# Patient Record
Sex: FEMALE | Race: WHITE | Employment: OTHER | URBAN - METROPOLITAN AREA
[De-identification: names, ages, dates, MRNs, and addresses within clinical notes are randomized per-mention and may not be internally consistent; named-entity substitution may affect disease eponyms.]

---

## 2017-09-07 ENCOUNTER — OP HISTORICAL/CONVERTED ENCOUNTER (OUTPATIENT)
Dept: OTHER | Age: 72
End: 2017-09-07

## 2018-01-29 ENCOUNTER — OP HISTORICAL/CONVERTED ENCOUNTER (OUTPATIENT)
Dept: OTHER | Age: 73
End: 2018-01-29

## 2018-02-05 ENCOUNTER — IP HISTORICAL/CONVERTED ENCOUNTER (OUTPATIENT)
Dept: OTHER | Age: 73
End: 2018-02-05

## 2018-08-08 ENCOUNTER — OP HISTORICAL/CONVERTED ENCOUNTER (OUTPATIENT)
Dept: OTHER | Age: 73
End: 2018-08-08

## 2019-04-26 ENCOUNTER — OP HISTORICAL/CONVERTED ENCOUNTER (OUTPATIENT)
Dept: OTHER | Age: 74
End: 2019-04-26

## 2020-01-28 ENCOUNTER — OP HISTORICAL/CONVERTED ENCOUNTER (OUTPATIENT)
Dept: OTHER | Age: 75
End: 2020-01-28

## 2020-02-16 ENCOUNTER — ED HISTORICAL/CONVERTED ENCOUNTER (OUTPATIENT)
Dept: OTHER | Age: 75
End: 2020-02-16

## 2020-02-27 ENCOUNTER — OP HISTORICAL/CONVERTED ENCOUNTER (OUTPATIENT)
Dept: OTHER | Age: 75
End: 2020-02-27

## 2020-06-14 ENCOUNTER — IP HISTORICAL/CONVERTED ENCOUNTER (OUTPATIENT)
Dept: OTHER | Age: 75
End: 2020-06-14

## 2020-07-10 ENCOUNTER — OP HISTORICAL/CONVERTED ENCOUNTER (OUTPATIENT)
Dept: OTHER | Age: 75
End: 2020-07-10

## 2020-10-13 ENCOUNTER — OFFICE VISIT (OUTPATIENT)
Dept: UROLOGY | Age: 75
End: 2020-10-13
Payer: MEDICARE

## 2020-10-13 VITALS
BODY MASS INDEX: 31.34 KG/M2 | HEIGHT: 61 IN | WEIGHT: 166 LBS | SYSTOLIC BLOOD PRESSURE: 146 MMHG | TEMPERATURE: 96.9 F | DIASTOLIC BLOOD PRESSURE: 90 MMHG

## 2020-10-13 DIAGNOSIS — R82.998 LEUKOCYTES IN URINE: Primary | ICD-10-CM

## 2020-10-13 DIAGNOSIS — N39.0 URINARY TRACT INFECTION WITHOUT HEMATURIA, SITE UNSPECIFIED: ICD-10-CM

## 2020-10-13 LAB
BILIRUB UR QL STRIP: NEGATIVE
GLUCOSE UR-MCNC: NEGATIVE MG/DL
KETONES P FAST UR STRIP-MCNC: NEGATIVE MG/DL
PH UR STRIP: 5 [PH] (ref 4.6–8)
PROT UR QL STRIP: NEGATIVE
SP GR UR STRIP: 1.02 (ref 1–1.03)
UA UROBILINOGEN AMB POC: NORMAL (ref 0.2–1)
URINALYSIS CLARITY POC: CLEAR
URINALYSIS COLOR POC: YELLOW
URINE BLOOD POC: NEGATIVE
URINE LEUKOCYTES POC: NORMAL
URINE NITRITES POC: NEGATIVE

## 2020-10-13 PROCEDURE — 99203 OFFICE O/P NEW LOW 30 MIN: CPT | Performed by: UROLOGY

## 2020-10-13 PROCEDURE — G8427 DOCREV CUR MEDS BY ELIG CLIN: HCPCS | Performed by: UROLOGY

## 2020-10-13 PROCEDURE — G8417 CALC BMI ABV UP PARAM F/U: HCPCS | Performed by: UROLOGY

## 2020-10-13 PROCEDURE — 81003 URINALYSIS AUTO W/O SCOPE: CPT | Performed by: UROLOGY

## 2020-10-13 RX ORDER — SIMVASTATIN 20 MG/1
TABLET, FILM COATED ORAL
COMMUNITY

## 2020-10-13 RX ORDER — MELOXICAM 15 MG/1
15 TABLET ORAL DAILY
COMMUNITY
End: 2021-03-12

## 2020-10-13 RX ORDER — OXYBUTYNIN CHLORIDE 5 MG/1
5 TABLET ORAL 2 TIMES DAILY
COMMUNITY

## 2020-10-13 RX ORDER — BISMUTH SUBSALICYLATE 262 MG
1 TABLET,CHEWABLE ORAL DAILY
COMMUNITY

## 2020-10-13 RX ORDER — CLOPIDOGREL BISULFATE 75 MG/1
TABLET ORAL
COMMUNITY

## 2020-10-13 RX ORDER — AMLODIPINE BESYLATE 10 MG/1
TABLET ORAL DAILY
COMMUNITY

## 2020-10-13 RX ORDER — FUROSEMIDE 40 MG/1
TABLET ORAL DAILY
COMMUNITY

## 2020-10-13 RX ORDER — OMEPRAZOLE 40 MG/1
40 CAPSULE, DELAYED RELEASE ORAL DAILY
COMMUNITY

## 2020-10-13 NOTE — LETTER
10/13/20 Patient: Kimberly Feliz YOB: 1945 Date of Visit: 10/13/2020 Kitty Jane MD 
[id number] [phone] VIA In Basket Dear Kitty Jane MD, Thank you for referring Ms. Nayan Mahmood to [address] [place] [address] [facility] [place] for evaluation. My notes for this consultation are attached. If you have questions, please do not hesitate to call me. I look forward to following your patient along with you. Sincerely, Keturah Solares MD

## 2020-10-13 NOTE — PROGRESS NOTES
HPI ROS PE NOTE          History of Present Illness   Chief complaint: Recurrent urinary tract infections  Timbo Galeano is a 67 y.o. female who presents with complaint of 2 serious episodes of hospitalization during the last year with urinary tract infection and sepsis. Her first episode was in February 2020 solved treatment in intensive care, not have records from this hospitalization, straight a problem from change of electronic medical record systems with previous system unavailable to reproduce. Denies any history of kidney stones and denies significant urinary symptoms such as incomplete emptying, she admits to nocturia 1-2 times per night but denies straining to urinate or sensation of incomplete emptying. Denies dysuria or hematuria. Was treated by her PCP and recently with 5 days of ciprofloxacin. Current urine specimen is negative and the patient is not having symptoms; she is taking cranberry supplement to assist in and preventing urinary tract infections she is also been on oxybutynin twice a day apparently to combat urinary frequency. The patient denies straining to urinate or sensation of incomplete emptying but does have nocturia 1-2 times per night. .   Past Medical History:   Diagnosis Date    Arthritis     Burning with urination     GERD (gastroesophageal reflux disease)     Hypercholesterolemia     Hypertension     Long term current use of anticoagulant therapy     plavix qd    Stroke [facility])       Past Surgical History:   Procedure Laterality Date    HX KNEE REPLACEMENT Left 2015    HX KNEE REPLACEMENT Right 2018    total    HX ORTHOPAEDIC Right 04/2019    femur fx repair oh placement     Family History   Problem Relation Age of Onset    Heart Disease Mother     Cancer Father         leukemia    Heart Disease Brother     Cancer Maternal Aunt         leukemia      Social History     Tobacco Use    Smoking status: Never Smoker    Smokeless tobacco: Never Used Substance Use Topics    Alcohol use: Never     Frequency: Never       Prior to Admission medications    Medication Sig Start Date End Date Taking? Authorizing Provider   cranberry extract 450 mg tab tablet Take 450 mg by mouth. Yes Provider, Historical   omeprazole (PRILOSEC) 40 mg capsule Take 40 mg by mouth daily. Yes Provider, Historical   amLODIPine (NORVASC) 10 mg tablet Take  by mouth daily. Yes Provider, Historical   oxybutynin (DITROPAN) 5 mg tablet Take 5 mg by mouth two (2) times a day. Yes Provider, Historical   clopidogreL (Plavix) 75 mg tab Take  by mouth. Yes Provider, Historical   simvastatin (ZOCOR) 20 mg tablet Take  by mouth nightly. Yes Provider, Historical   furosemide (LASIX) 40 mg tablet Take  by mouth daily. Yes Provider, Historical   meloxicam (MOBIC) 15 mg tablet Take 15 mg by mouth daily. Yes Provider, Historical   multivitamin (ONE A DAY) tablet Take 1 Tab by mouth daily.    Yes Provider, Historical     No Known Allergies     Review of Systems:  Constitutional: negative  Respiratory: negative  Cardiovascular: positive for Previous history of screening for coronary artery disease; patient may have had CVA or TIA  Gastrointestinal: positive for constipation  Genitourinary:positive for frequency and nocturia  Musculoskeletal:positive for myalgias, arthralgias, stiff joints and Osteoarthritis of the knees with knee replacements  Neurological: negative     Physical Exam     Physical Exam:   Visit Vitals  BP (!) 146/90   Temp 96.9 °F (36.1 °C) (Temporal)   Ht 5' 1\" (1.549 m)   Wt 166 lb (75.3 kg)   BMI 31.37 kg/m²     General appearance: alert, cooperative, no distress, appears stated age  Head: Normocephalic, without obvious abnormality, atraumatic  Lungs: clear to auscultation bilaterally  Heart: regular rate and rhythm, S1, S2 normal, no murmur, click, rub or gallop  Abdomen: Soft, nontender, possible palpable bladder and suprapubic area  Extremities: extremities normal, atraumatic, no cyanosis or edema  Skin: Skin color, texture, turgor normal. No rashes or lesions  Neurologic: Grossly normal    Data Review:   Recent Results (from the past 48 hour(s))   AMB POC URINALYSIS DIP STICK AUTO W/O MICRO    Collection Time: 10/13/20 11:26 AM   Result Value Ref Range    Color (UA POC) Yellow     Clarity (UA POC) Clear     Glucose (UA POC) Negative Negative    Bilirubin (UA POC) Negative Negative    Ketones (UA POC) Negative Negative    Specific gravity (UA POC) 1.020 1.001 - 1.035    Blood (UA POC) Negative Negative    pH (UA POC) 5.0 4.6 - 8.0    Protein (UA POC) Negative Negative    Urobilinogen (UA POC) 0.2 mg/dL 0.2 - 1    Nitrites (UA POC) Negative Negative    Leukocyte esterase (UA POC) 1+ Negative     No results for input(s): 48 in the last 72 hours. Assessment and Plan:   Recurrent urinary tract infections with sepsis on at least 2 occasions, possible bladder outlet obstruction with urethral stenosis: Recommend cystourethroscopy, bilateral retrograde pyelograms, and urethral dilation;may  need medical clearance for anesthesia, stopping of clopidogrel may be preferable for a few days prior to the procedure  Leukocytes in the urine, perform culture and sensitivity on today's specimen      Ms. Judy Fields has a reminder for a \"due or due soon\" health maintenance. I have asked that she contact her primary care provider for follow-up on this health maintenance. Katey Dacosta M.D.  10/13/2020

## 2020-10-15 LAB — BACTERIA UR CULT: NORMAL

## 2021-03-02 ENCOUNTER — APPOINTMENT (OUTPATIENT)
Dept: GENERAL RADIOLOGY | Age: 76
DRG: 522 | End: 2021-03-02
Attending: EMERGENCY MEDICINE
Payer: MEDICARE

## 2021-03-02 ENCOUNTER — APPOINTMENT (OUTPATIENT)
Dept: GENERAL RADIOLOGY | Age: 76
DRG: 522 | End: 2021-03-02
Attending: FAMILY MEDICINE
Payer: MEDICARE

## 2021-03-02 ENCOUNTER — HOSPITAL ENCOUNTER (INPATIENT)
Age: 76
LOS: 10 days | Discharge: NURSING, REHAB OR LONG-TERM CARE | DRG: 522 | End: 2021-03-12
Attending: EMERGENCY MEDICINE | Admitting: FAMILY MEDICINE
Payer: MEDICARE

## 2021-03-02 DIAGNOSIS — S72.001A CLOSED FRACTURE OF NECK OF RIGHT FEMUR, INITIAL ENCOUNTER (HCC): ICD-10-CM

## 2021-03-02 DIAGNOSIS — S42.291A OTHER CLOSED DISPLACED FRACTURE OF PROXIMAL END OF RIGHT HUMERUS, INITIAL ENCOUNTER: Primary | ICD-10-CM

## 2021-03-02 PROBLEM — S72.009A HIP FRACTURE (HCC): Status: ACTIVE | Noted: 2021-03-02

## 2021-03-02 LAB
ABO + RH BLD: NORMAL
ALBUMIN SERPL-MCNC: 3.7 G/DL (ref 3.5–5)
ALBUMIN/GLOB SERPL: 1.1 {RATIO} (ref 1.1–2.2)
ALP SERPL-CCNC: 80 U/L (ref 45–117)
ALT SERPL-CCNC: 28 U/L (ref 12–78)
ANION GAP SERPL CALC-SCNC: 11 MMOL/L (ref 5–15)
AST SERPL W P-5'-P-CCNC: 18 U/L (ref 15–37)
BASOPHILS # BLD: 0 K/UL (ref 0–0.1)
BASOPHILS NFR BLD: 0 % (ref 0–1)
BILIRUB SERPL-MCNC: 0.3 MG/DL (ref 0.2–1)
BLOOD BANK CMNT PATIENT-IMP: NORMAL
BLOOD GROUP ANTIBODIES SERPL: NEGATIVE
BUN SERPL-MCNC: 25 MG/DL (ref 6–20)
BUN/CREAT SERPL: 28 (ref 12–20)
CA-I BLD-MCNC: 8.9 MG/DL (ref 8.5–10.1)
CHLORIDE SERPL-SCNC: 107 MMOL/L (ref 97–108)
CO2 SERPL-SCNC: 24 MMOL/L (ref 21–32)
CREAT SERPL-MCNC: 0.88 MG/DL (ref 0.55–1.02)
DIFFERENTIAL METHOD BLD: ABNORMAL
EOSINOPHIL # BLD: 0 K/UL (ref 0–0.4)
EOSINOPHIL NFR BLD: 0 % (ref 0–7)
ERYTHROCYTE [DISTWIDTH] IN BLOOD BY AUTOMATED COUNT: 13.3 % (ref 11.5–14.5)
GLOBULIN SER CALC-MCNC: 3.5 G/DL (ref 2–4)
GLUCOSE SERPL-MCNC: 127 MG/DL (ref 65–100)
HCT VFR BLD AUTO: 33.8 % (ref 35–47)
HGB BLD-MCNC: 10.9 G/DL (ref 11.5–16)
IMM GRANULOCYTES # BLD AUTO: 0.1 K/UL (ref 0–0.04)
IMM GRANULOCYTES NFR BLD AUTO: 0 % (ref 0–0.5)
LYMPHOCYTES # BLD: 1 K/UL (ref 0.8–3.5)
LYMPHOCYTES NFR BLD: 6 % (ref 12–49)
MCH RBC QN AUTO: 28.7 PG (ref 26–34)
MCHC RBC AUTO-ENTMCNC: 32.2 G/DL (ref 30–36.5)
MCV RBC AUTO: 88.9 FL (ref 80–99)
MONOCYTES # BLD: 0.8 K/UL (ref 0–1)
MONOCYTES NFR BLD: 5 % (ref 5–13)
NEUTS SEG # BLD: 14.4 K/UL (ref 1.8–8)
NEUTS SEG NFR BLD: 89 % (ref 32–75)
PLATELET # BLD AUTO: 277 K/UL (ref 150–400)
PMV BLD AUTO: 10.7 FL (ref 8.9–12.9)
POTASSIUM SERPL-SCNC: 4 MMOL/L (ref 3.5–5.1)
PROT SERPL-MCNC: 7.2 G/DL (ref 6.4–8.2)
RBC # BLD AUTO: 3.8 M/UL (ref 3.8–5.2)
SODIUM SERPL-SCNC: 142 MMOL/L (ref 136–145)
SPECIMEN EXP DATE BLD: NORMAL
WBC # BLD AUTO: 16.2 K/UL (ref 3.6–11)

## 2021-03-02 PROCEDURE — 86901 BLOOD TYPING SEROLOGIC RH(D): CPT

## 2021-03-02 PROCEDURE — 73502 X-RAY EXAM HIP UNI 2-3 VIEWS: CPT

## 2021-03-02 PROCEDURE — 74011250636 HC RX REV CODE- 250/636: Performed by: FAMILY MEDICINE

## 2021-03-02 PROCEDURE — 96374 THER/PROPH/DIAG INJ IV PUSH: CPT

## 2021-03-02 PROCEDURE — 71045 X-RAY EXAM CHEST 1 VIEW: CPT

## 2021-03-02 PROCEDURE — 74011000250 HC RX REV CODE- 250: Performed by: FAMILY MEDICINE

## 2021-03-02 PROCEDURE — 96375 TX/PRO/DX INJ NEW DRUG ADDON: CPT

## 2021-03-02 PROCEDURE — 65270000029 HC RM PRIVATE

## 2021-03-02 PROCEDURE — 74011250636 HC RX REV CODE- 250/636: Performed by: EMERGENCY MEDICINE

## 2021-03-02 PROCEDURE — 87040 BLOOD CULTURE FOR BACTERIA: CPT

## 2021-03-02 PROCEDURE — 93005 ELECTROCARDIOGRAM TRACING: CPT

## 2021-03-02 PROCEDURE — 73552 X-RAY EXAM OF FEMUR 2/>: CPT

## 2021-03-02 PROCEDURE — 36415 COLL VENOUS BLD VENIPUNCTURE: CPT

## 2021-03-02 PROCEDURE — 85025 COMPLETE CBC W/AUTO DIFF WBC: CPT

## 2021-03-02 PROCEDURE — 73030 X-RAY EXAM OF SHOULDER: CPT

## 2021-03-02 PROCEDURE — 80053 COMPREHEN METABOLIC PANEL: CPT

## 2021-03-02 PROCEDURE — 99285 EMERGENCY DEPT VISIT HI MDM: CPT

## 2021-03-02 RX ORDER — ACETAMINOPHEN 650 MG/1
650 SUPPOSITORY RECTAL
Status: DISCONTINUED | OUTPATIENT
Start: 2021-03-02 | End: 2021-03-03

## 2021-03-02 RX ORDER — BISMUTH SUBSALICYLATE 262 MG
1 TABLET,CHEWABLE ORAL DAILY
Status: DISCONTINUED | OUTPATIENT
Start: 2021-03-03 | End: 2021-03-12 | Stop reason: HOSPADM

## 2021-03-02 RX ORDER — BISMUTH SUBSALICYLATE 262 MG
1 TABLET,CHEWABLE ORAL DAILY
Status: DISCONTINUED | OUTPATIENT
Start: 2021-03-03 | End: 2021-03-03

## 2021-03-02 RX ORDER — ONDANSETRON 2 MG/ML
4 INJECTION INTRAMUSCULAR; INTRAVENOUS
Status: DISCONTINUED | OUTPATIENT
Start: 2021-03-02 | End: 2021-03-12 | Stop reason: HOSPADM

## 2021-03-02 RX ORDER — AMLODIPINE BESYLATE 5 MG/1
10 TABLET ORAL DAILY
Status: DISCONTINUED | OUTPATIENT
Start: 2021-03-03 | End: 2021-03-12 | Stop reason: HOSPADM

## 2021-03-02 RX ORDER — OXYBUTYNIN CHLORIDE 5 MG/1
5 TABLET ORAL 2 TIMES DAILY
Status: DISCONTINUED | OUTPATIENT
Start: 2021-03-03 | End: 2021-03-03

## 2021-03-02 RX ORDER — ONDANSETRON 4 MG/1
4 TABLET, ORALLY DISINTEGRATING ORAL
Status: DISCONTINUED | OUTPATIENT
Start: 2021-03-02 | End: 2021-03-12 | Stop reason: HOSPADM

## 2021-03-02 RX ORDER — HYDROMORPHONE HYDROCHLORIDE 1 MG/ML
1 INJECTION, SOLUTION INTRAMUSCULAR; INTRAVENOUS; SUBCUTANEOUS ONCE
Status: COMPLETED | OUTPATIENT
Start: 2021-03-02 | End: 2021-03-02

## 2021-03-02 RX ORDER — MORPHINE SULFATE 2 MG/ML
2 INJECTION, SOLUTION INTRAMUSCULAR; INTRAVENOUS
Status: DISPENSED | OUTPATIENT
Start: 2021-03-02 | End: 2021-03-04

## 2021-03-02 RX ORDER — SODIUM CHLORIDE 9 MG/ML
75 INJECTION, SOLUTION INTRAVENOUS CONTINUOUS
Status: DISCONTINUED | OUTPATIENT
Start: 2021-03-03 | End: 2021-03-03

## 2021-03-02 RX ORDER — MORPHINE SULFATE 4 MG/ML
4 INJECTION INTRAVENOUS ONCE
Status: COMPLETED | OUTPATIENT
Start: 2021-03-02 | End: 2021-03-02

## 2021-03-02 RX ORDER — PANTOPRAZOLE SODIUM 40 MG/1
40 TABLET, DELAYED RELEASE ORAL
Status: ACTIVE | OUTPATIENT
Start: 2021-03-03 | End: 2021-03-03

## 2021-03-02 RX ORDER — OXYBUTYNIN CHLORIDE 5 MG/1
5 TABLET ORAL 2 TIMES DAILY
Status: DISCONTINUED | OUTPATIENT
Start: 2021-03-03 | End: 2021-03-12 | Stop reason: HOSPADM

## 2021-03-02 RX ORDER — AMLODIPINE BESYLATE 5 MG/1
10 TABLET ORAL DAILY
Status: DISCONTINUED | OUTPATIENT
Start: 2021-03-03 | End: 2021-03-03

## 2021-03-02 RX ORDER — ONDANSETRON 2 MG/ML
4 INJECTION INTRAMUSCULAR; INTRAVENOUS
Status: COMPLETED | OUTPATIENT
Start: 2021-03-02 | End: 2021-03-02

## 2021-03-02 RX ORDER — SIMVASTATIN 10 MG/1
20 TABLET, FILM COATED ORAL
Status: DISCONTINUED | OUTPATIENT
Start: 2021-03-03 | End: 2021-03-12 | Stop reason: HOSPADM

## 2021-03-02 RX ORDER — PANTOPRAZOLE SODIUM 40 MG/1
40 TABLET, DELAYED RELEASE ORAL
Status: DISCONTINUED | OUTPATIENT
Start: 2021-03-03 | End: 2021-03-12 | Stop reason: HOSPADM

## 2021-03-02 RX ORDER — SODIUM CHLORIDE 9 MG/ML
75 INJECTION, SOLUTION INTRAVENOUS CONTINUOUS
Status: DISCONTINUED | OUTPATIENT
Start: 2021-03-02 | End: 2021-03-05

## 2021-03-02 RX ORDER — POLYETHYLENE GLYCOL 3350 17 G/17G
17 POWDER, FOR SOLUTION ORAL DAILY PRN
Status: DISCONTINUED | OUTPATIENT
Start: 2021-03-02 | End: 2021-03-12 | Stop reason: HOSPADM

## 2021-03-02 RX ORDER — SIMVASTATIN 10 MG/1
20 TABLET, FILM COATED ORAL
Status: DISCONTINUED | OUTPATIENT
Start: 2021-03-02 | End: 2021-03-03

## 2021-03-02 RX ORDER — FUROSEMIDE 40 MG/1
40 TABLET ORAL DAILY
Status: DISCONTINUED | OUTPATIENT
Start: 2021-03-03 | End: 2021-03-03

## 2021-03-02 RX ORDER — ACETAMINOPHEN 325 MG/1
650 TABLET ORAL
Status: DISCONTINUED | OUTPATIENT
Start: 2021-03-02 | End: 2021-03-03

## 2021-03-02 RX ORDER — FUROSEMIDE 40 MG/1
40 TABLET ORAL DAILY
Status: DISCONTINUED | OUTPATIENT
Start: 2021-03-03 | End: 2021-03-12 | Stop reason: HOSPADM

## 2021-03-02 RX ORDER — ENOXAPARIN SODIUM 100 MG/ML
40 INJECTION SUBCUTANEOUS DAILY
Status: DISCONTINUED | OUTPATIENT
Start: 2021-03-03 | End: 2021-03-06

## 2021-03-02 RX ADMIN — CEFTRIAXONE SODIUM 1 G: 1 INJECTION, POWDER, FOR SOLUTION INTRAMUSCULAR; INTRAVENOUS at 23:46

## 2021-03-02 RX ADMIN — MORPHINE SULFATE 4 MG: 4 INJECTION, SOLUTION INTRAMUSCULAR; INTRAVENOUS at 19:19

## 2021-03-02 RX ADMIN — MORPHINE SULFATE 2 MG: 2 INJECTION, SOLUTION INTRAMUSCULAR; INTRAVENOUS at 23:50

## 2021-03-02 RX ADMIN — HYDROMORPHONE HYDROCHLORIDE 1 MG: 1 INJECTION, SOLUTION INTRAMUSCULAR; INTRAVENOUS; SUBCUTANEOUS at 20:37

## 2021-03-02 RX ADMIN — ONDANSETRON 4 MG: 2 INJECTION INTRAMUSCULAR; INTRAVENOUS at 20:42

## 2021-03-02 RX ADMIN — SODIUM CHLORIDE 75 ML/HR: 9 INJECTION, SOLUTION INTRAVENOUS at 22:07

## 2021-03-02 NOTE — Clinical Note
Status[de-identified] INPATIENT [101]   Type of Bed: Surgical [18]   Inpatient Hospitalization Certified Necessary for the Following Reasons: 3.  Patient receiving treatment that can only be provided in an inpatient setting (further clarification in H&P documentation)   Admitting Diagnosis: Closed right hip fracture [facility]) [[id number]]   Admitting Physician: Janusz Lowe [[id number]]   Attending Physician: Janusz Lowe [[id number]]   Estimated Length of Stay: 2 Midnights   Discharge Plan[de-identified] Home with Office Follow-up

## 2021-03-02 NOTE — ED PROVIDER NOTES
EMERGENCY DEPARTMENT HISTORY AND PHYSICAL EXAM        Date: 3/2/2021  Patient Name: Cherrie Terrazas    History of Presenting Illness     Chief Complaint   Patient presents with    Fall       History Provided By: Patient    HPI: Cherrie Terrazas, 67 y.o. female with history of GERD, hyperlipidemia, hypertension, and CVA who presents with fall. Patient is complaining of 10/10 pain in her right shoulder and right hip. Pain is worse with movement in both extremities. Pain is nonradiating, and constant. Denies hitting her head. No neck pain. States that she fell while in the garage walking her dog. PCP: Alvaro Chadwick NP    Current Facility-Administered Medications   Medication Dose Route Frequency Provider Last Rate Last Admin    0.9% sodium chloride infusion  75 mL/hr IntraVENous CONTINUOUS Lisa Budgeeta,          Current Outpatient Medications   Medication Sig Dispense Refill    cranberry extract 450 mg tab tablet Take 450 mg by mouth.  omeprazole (PRILOSEC) 40 mg capsule Take 40 mg by mouth daily.  amLODIPine (NORVASC) 10 mg tablet Take  by mouth daily.  oxybutynin (DITROPAN) 5 mg tablet Take 5 mg by mouth two (2) times a day.  clopidogreL (Plavix) 75 mg tab Take  by mouth.  simvastatin (ZOCOR) 20 mg tablet Take  by mouth nightly.  furosemide (LASIX) 40 mg tablet Take  by mouth daily.  meloxicam (MOBIC) 15 mg tablet Take 15 mg by mouth daily.  multivitamin (ONE A DAY) tablet Take 1 Tab by mouth daily.          Past History     Past Medical History:  Past Medical History:   Diagnosis Date    Arthritis     Burning with urination     GERD (gastroesophageal reflux disease)     Hypercholesterolemia     Hypertension     Long term current use of anticoagulant therapy     plavix qd    Stroke [facility])        Past Surgical History:  Past Surgical History:   Procedure Laterality Date    HX KNEE REPLACEMENT Left 2015    HX KNEE REPLACEMENT Right 2018    total    HX ORTHOPAEDIC Right 04/2019    femur fx repair oh placement       Family History:  Family History   Problem Relation Age of Onset    Heart Disease Mother     Cancer Father         leukemia    Heart Disease Brother     Cancer Maternal Aunt         leukemia       Social History:  Social History     Tobacco Use    Smoking status: Never Smoker    Smokeless tobacco: Never Used   Substance Use Topics    Alcohol use: Never     Frequency: Never    Drug use: Never       Allergies:  No Known Allergies    Review of Systems   Review of Systems   Constitutional: Negative for fever. HENT: Negative for congestion. Eyes: Negative for visual disturbance. Respiratory: Negative for shortness of breath. Cardiovascular: Negative for chest pain. Gastrointestinal: Negative for abdominal pain. Genitourinary: Negative for dysuria. Musculoskeletal: Negative for arthralgias. Skin: Negative for rash. Neurological: Negative for headaches. Physical Exam   Constitutional: Moderate distress and appears uncomfortable. Nontoxic. Well-nourished. Skin: No rash. ENT: No rhinorrhea. No cough. Head is normocephalic and atraumatic. Eye: No proptosis or conjunctival injections. Respiratory: No apparent respiratory distress. Gastrointestinal: Nondistended. Musculoskeletal: Tenderness to the right shoulder and right hip. Any movement in the extremity in the right hip or right shoulder causes pain. No swelling. Normal peripheral perfusion and pulses distally. Psychiatric: Cooperative. Appropriate mood and affect. Diagnostic Study Results     Labs -   No results found for this or any previous visit (from the past 24 hour(s)). Radiologic Studies -   XR HIP RT W OR WO PELV 2-3 VWS   Final Result   FINDINGS: IMPRESSION: AP pelvis and limited lateral versus frog-leg imaging of   the right hip. Right femoral neck fracture with superior migration of the long bone fragment. No dislocation.       Incomplete imaging of  mid femoral diaphyseal deformity from remote fracture. Intramedullary femoral oh has been removed since 2018. XR SHOULDER RT AP/LAT MIN 2 V   Final Result   FINDINGS: IMPRESSION: 3 images of the right shoulder. Transverse fracture of the humerus neck with almost complete displacement of the   long bone fragment medially. The humerus head maintains gross alignment with the   bony glenoid. CT Results  (Last 48 hours)    None        CXR Results  (Last 48 hours)    None          Medical Decision Making and ED Course     I reviewed the available vital signs, nursing notes, past medical history, past surgical history, family history, and social history. Vital Signs - Reviewed the patient's vital signs. Patient Vitals for the past 12 hrs:   Temp Pulse Resp BP SpO2   03/02/21 1759 96.8 °F (36 °C) 76 18 (!) 143/80 93 %     EKG: Obtained on 3/2/2021 at 2153. Normal sinus rhythm at rate of 80 bpm.  Normal IA interval, QRS duration, QTc interval.  No ST segment abnormalities. Normal axis. Left bundle branch block    Medical Decision Making:   Presented with fall. The differential diagnosis is shoulder fracture, shoulder dislocation, hip fracture, hip dislocation. X-ray shows right shoulder and right hip fracture. Discussed with orthopedic surgeon, Dr. Juancho Urena, who agreed to consult. Discussed with hospitalist, Dr. Manuel Paulino, who agreed to admit. Pain reasonably controlled with morphine and Dilaudid. Disposition     Admitted to Dr. Manuel Paulino    Diagnosis     Clinical impression:   1. Other closed displaced fracture of proximal end of right humerus, initial encounter    2. Closed fracture of neck of right femur, initial encounter [facility])           Attestation:  Please note that this dictation was completed with [employer], the [employer] voice recognition software.  Quite often unanticipated grammatical, syntax, homophones, and other interpretive errors are inadvertently transcribed by the computer software. Please disregard these errors. Please excuse any errors that have escaped final proofreading. Thank you.   Kenyon Rios, DO

## 2021-03-02 NOTE — ED TRIAGE NOTES
Pt states she had a GLF outside earlier today, believes she lost her footing on some rocks. Denies head injury. C/o R arm/shoulder pain and R hip pain. Pt given 100mcg of fentanyl PTA.  A?O x4

## 2021-03-03 ENCOUNTER — APPOINTMENT (OUTPATIENT)
Dept: GENERAL RADIOLOGY | Age: 76
DRG: 522 | End: 2021-03-03
Attending: ORTHOPAEDIC SURGERY
Payer: MEDICARE

## 2021-03-03 ENCOUNTER — ANESTHESIA EVENT (OUTPATIENT)
Dept: SURGERY | Age: 76
DRG: 522 | End: 2021-03-03
Payer: MEDICARE

## 2021-03-03 ENCOUNTER — ANESTHESIA (OUTPATIENT)
Dept: SURGERY | Age: 76
DRG: 522 | End: 2021-03-03
Payer: MEDICARE

## 2021-03-03 LAB
ALBUMIN SERPL-MCNC: 3.6 G/DL (ref 3.5–5)
ALBUMIN/GLOB SERPL: 1 {RATIO} (ref 1.1–2.2)
ALP SERPL-CCNC: 77 U/L (ref 45–117)
ALT SERPL-CCNC: 27 U/L (ref 12–78)
ANION GAP SERPL CALC-SCNC: 7 MMOL/L (ref 5–15)
APPEARANCE UR: CLEAR
AST SERPL W P-5'-P-CCNC: 18 U/L (ref 15–37)
ATRIAL RATE: 80 BPM
BACTERIA URNS QL MICRO: NEGATIVE /HPF
BASOPHILS # BLD: 0 K/UL (ref 0–0.1)
BASOPHILS # BLD: 0 K/UL (ref 0–0.1)
BASOPHILS NFR BLD: 0 % (ref 0–1)
BASOPHILS NFR BLD: 0 % (ref 0–1)
BILIRUB SERPL-MCNC: 0.3 MG/DL (ref 0.2–1)
BILIRUB UR QL: NEGATIVE
BUN SERPL-MCNC: 27 MG/DL (ref 6–20)
BUN/CREAT SERPL: 30 (ref 12–20)
CA-I BLD-MCNC: 9.1 MG/DL (ref 8.5–10.1)
CALCULATED P AXIS, ECG09: 58 DEGREES
CALCULATED R AXIS, ECG10: -29 DEGREES
CALCULATED T AXIS, ECG11: 109 DEGREES
CHLORIDE SERPL-SCNC: 106 MMOL/L (ref 97–108)
CO2 SERPL-SCNC: 26 MMOL/L (ref 21–32)
COLOR UR: ABNORMAL
CREAT SERPL-MCNC: 0.9 MG/DL (ref 0.55–1.02)
DIAGNOSIS, 93000: NORMAL
DIFFERENTIAL METHOD BLD: ABNORMAL
DIFFERENTIAL METHOD BLD: ABNORMAL
EOSINOPHIL # BLD: 0 K/UL (ref 0–0.4)
EOSINOPHIL # BLD: 0 K/UL (ref 0–0.4)
EOSINOPHIL NFR BLD: 0 % (ref 0–7)
EOSINOPHIL NFR BLD: 0 % (ref 0–7)
ERYTHROCYTE [DISTWIDTH] IN BLOOD BY AUTOMATED COUNT: 13.4 % (ref 11.5–14.5)
ERYTHROCYTE [DISTWIDTH] IN BLOOD BY AUTOMATED COUNT: 13.4 % (ref 11.5–14.5)
GLOBULIN SER CALC-MCNC: 3.6 G/DL (ref 2–4)
GLUCOSE SERPL-MCNC: 132 MG/DL (ref 65–100)
GLUCOSE UR STRIP.AUTO-MCNC: NEGATIVE MG/DL
HCT VFR BLD AUTO: 30.7 % (ref 35–47)
HCT VFR BLD AUTO: 32.4 % (ref 35–47)
HGB BLD-MCNC: 10.5 G/DL (ref 11.5–16)
HGB BLD-MCNC: 9.7 G/DL (ref 11.5–16)
HGB UR QL STRIP: ABNORMAL
IMM GRANULOCYTES # BLD AUTO: 0 K/UL (ref 0–0.04)
IMM GRANULOCYTES # BLD AUTO: 0.1 K/UL (ref 0–0.04)
IMM GRANULOCYTES NFR BLD AUTO: 0 % (ref 0–0.5)
IMM GRANULOCYTES NFR BLD AUTO: 0 % (ref 0–0.5)
KETONES UR QL STRIP.AUTO: 5 MG/DL
LEUKOCYTE ESTERASE UR QL STRIP.AUTO: NEGATIVE
LYMPHOCYTES # BLD: 0.8 K/UL (ref 0.8–3.5)
LYMPHOCYTES # BLD: 1.7 K/UL (ref 0.8–3.5)
LYMPHOCYTES NFR BLD: 13 % (ref 12–49)
LYMPHOCYTES NFR BLD: 5 % (ref 12–49)
MAGNESIUM SERPL-MCNC: 1.8 MG/DL (ref 1.6–2.4)
MCH RBC QN AUTO: 28.5 PG (ref 26–34)
MCH RBC QN AUTO: 29 PG (ref 26–34)
MCHC RBC AUTO-ENTMCNC: 31.6 G/DL (ref 30–36.5)
MCHC RBC AUTO-ENTMCNC: 32.4 G/DL (ref 30–36.5)
MCV RBC AUTO: 89.5 FL (ref 80–99)
MCV RBC AUTO: 90.3 FL (ref 80–99)
MONOCYTES # BLD: 0.6 K/UL (ref 0–1)
MONOCYTES # BLD: 1 K/UL (ref 0–1)
MONOCYTES NFR BLD: 4 % (ref 5–13)
MONOCYTES NFR BLD: 7 % (ref 5–13)
MUCOUS THREADS URNS QL MICRO: ABNORMAL /LPF
NEUTS SEG # BLD: 10.5 K/UL (ref 1.8–8)
NEUTS SEG # BLD: 14.6 K/UL (ref 1.8–8)
NEUTS SEG NFR BLD: 80 % (ref 32–75)
NEUTS SEG NFR BLD: 91 % (ref 32–75)
NITRITE UR QL STRIP.AUTO: NEGATIVE
NRBC # BLD: 0 K/UL (ref 0–0.01)
NRBC BLD-RTO: 0 PER 100 WBC
P-R INTERVAL, ECG05: 150 MS
PH UR STRIP: 6 [PH] (ref 5–8)
PLATELET # BLD AUTO: 240 K/UL (ref 150–400)
PLATELET # BLD AUTO: 271 K/UL (ref 150–400)
PMV BLD AUTO: 10.4 FL (ref 8.9–12.9)
PMV BLD AUTO: 11 FL (ref 8.9–12.9)
POTASSIUM SERPL-SCNC: 4.1 MMOL/L (ref 3.5–5.1)
POTASSIUM SERPL-SCNC: 4.4 MMOL/L (ref 3.5–5.1)
PROT SERPL-MCNC: 7.2 G/DL (ref 6.4–8.2)
PROT UR STRIP-MCNC: NEGATIVE MG/DL
Q-T INTERVAL, ECG07: 424 MS
QRS DURATION, ECG06: 136 MS
QTC CALCULATION (BEZET), ECG08: 489 MS
RBC # BLD AUTO: 3.4 M/UL (ref 3.8–5.2)
RBC # BLD AUTO: 3.62 M/UL (ref 3.8–5.2)
RBC #/AREA URNS HPF: ABNORMAL /HPF (ref 0–5)
SODIUM SERPL-SCNC: 139 MMOL/L (ref 136–145)
SP GR UR REFRACTOMETRY: 1.02 (ref 1–1.03)
UROBILINOGEN UR QL STRIP.AUTO: 0.1 EU/DL (ref 0.1–1)
VENTRICULAR RATE, ECG03: 80 BPM
WBC # BLD AUTO: 13.2 K/UL (ref 3.6–11)
WBC # BLD AUTO: 16 K/UL (ref 3.6–11)
WBC URNS QL MICRO: ABNORMAL /HPF (ref 0–4)

## 2021-03-03 PROCEDURE — 76210000006 HC OR PH I REC 0.5 TO 1 HR: Performed by: ORTHOPAEDIC SURGERY

## 2021-03-03 PROCEDURE — 77030010783 HC BOWL MX BN CEM J&J -B: Performed by: ORTHOPAEDIC SURGERY

## 2021-03-03 PROCEDURE — 74011250636 HC RX REV CODE- 250/636: Performed by: ORTHOPAEDIC SURGERY

## 2021-03-03 PROCEDURE — 65270000029 HC RM PRIVATE

## 2021-03-03 PROCEDURE — 74011250637 HC RX REV CODE- 250/637: Performed by: ORTHOPAEDIC SURGERY

## 2021-03-03 PROCEDURE — 77030006835 HC BLD SAW SAG STRY -B: Performed by: ORTHOPAEDIC SURGERY

## 2021-03-03 PROCEDURE — 77030035236 HC SUT PDS STRATFX BARB J&J -B: Performed by: ORTHOPAEDIC SURGERY

## 2021-03-03 PROCEDURE — 93005 ELECTROCARDIOGRAM TRACING: CPT

## 2021-03-03 PROCEDURE — 76010000131 HC OR TIME 2 TO 2.5 HR: Performed by: ORTHOPAEDIC SURGERY

## 2021-03-03 PROCEDURE — 85025 COMPLETE CBC W/AUTO DIFF WBC: CPT

## 2021-03-03 PROCEDURE — 77030012935 HC DRSG AQUACEL BMS -B: Performed by: ORTHOPAEDIC SURGERY

## 2021-03-03 PROCEDURE — 74011250636 HC RX REV CODE- 250/636: Performed by: EMERGENCY MEDICINE

## 2021-03-03 PROCEDURE — 84132 ASSAY OF SERUM POTASSIUM: CPT

## 2021-03-03 PROCEDURE — 77030003666 HC NDL SPINAL BD -A: Performed by: ORTHOPAEDIC SURGERY

## 2021-03-03 PROCEDURE — 83735 ASSAY OF MAGNESIUM: CPT

## 2021-03-03 PROCEDURE — 77030008684 HC TU ET CUF COVD -B: Performed by: NURSE ANESTHETIST, CERTIFIED REGISTERED

## 2021-03-03 PROCEDURE — 74011250637 HC RX REV CODE- 250/637: Performed by: FAMILY MEDICINE

## 2021-03-03 PROCEDURE — 77030002933 HC SUT MCRYL J&J -A: Performed by: ORTHOPAEDIC SURGERY

## 2021-03-03 PROCEDURE — 77030018074 HC RTVR SUT ARTH4 S&N -B: Performed by: ORTHOPAEDIC SURGERY

## 2021-03-03 PROCEDURE — 77030013079 HC BLNKT BAIR HGGR 3M -A: Performed by: NURSE ANESTHETIST, CERTIFIED REGISTERED

## 2021-03-03 PROCEDURE — 87040 BLOOD CULTURE FOR BACTERIA: CPT

## 2021-03-03 PROCEDURE — 87086 URINE CULTURE/COLONY COUNT: CPT

## 2021-03-03 PROCEDURE — 76060000035 HC ANESTHESIA 2 TO 2.5 HR: Performed by: ORTHOPAEDIC SURGERY

## 2021-03-03 PROCEDURE — 77030013708 HC HNDPC SUC IRR PULS STRY –B: Performed by: ORTHOPAEDIC SURGERY

## 2021-03-03 PROCEDURE — 36415 COLL VENOUS BLD VENIPUNCTURE: CPT

## 2021-03-03 PROCEDURE — 77030011264 HC ELECTRD BLD EXT COVD -A: Performed by: ORTHOPAEDIC SURGERY

## 2021-03-03 PROCEDURE — 2709999900 HC NON-CHARGEABLE SUPPLY: Performed by: ORTHOPAEDIC SURGERY

## 2021-03-03 PROCEDURE — 77030019908 HC STETH ESOPH SIMS -A: Performed by: NURSE ANESTHETIST, CERTIFIED REGISTERED

## 2021-03-03 PROCEDURE — 81001 URINALYSIS AUTO W/SCOPE: CPT

## 2021-03-03 PROCEDURE — 77030031139 HC SUT VCRL2 J&J -A: Performed by: ORTHOPAEDIC SURGERY

## 2021-03-03 PROCEDURE — 72170 X-RAY EXAM OF PELVIS: CPT

## 2021-03-03 PROCEDURE — 0SRR01Z REPLACEMENT OF RIGHT HIP JOINT, FEMORAL SURFACE WITH METAL SYNTHETIC SUBSTITUTE, OPEN APPROACH: ICD-10-PCS | Performed by: ORTHOPAEDIC SURGERY

## 2021-03-03 PROCEDURE — 74011000250 HC RX REV CODE- 250: Performed by: FAMILY MEDICINE

## 2021-03-03 PROCEDURE — C1776 JOINT DEVICE (IMPLANTABLE): HCPCS | Performed by: ORTHOPAEDIC SURGERY

## 2021-03-03 PROCEDURE — 74011000250 HC RX REV CODE- 250: Performed by: ORTHOPAEDIC SURGERY

## 2021-03-03 PROCEDURE — 74011250636 HC RX REV CODE- 250/636: Performed by: FAMILY MEDICINE

## 2021-03-03 PROCEDURE — 74011000250 HC RX REV CODE- 250: Performed by: NURSE ANESTHETIST, CERTIFIED REGISTERED

## 2021-03-03 PROCEDURE — 74011250636 HC RX REV CODE- 250/636: Performed by: NURSE ANESTHETIST, CERTIFIED REGISTERED

## 2021-03-03 DEVICE — HEAD FEM DIA28MM NK L+1.5MM HIP MTL ON MTL 12/14 TAPR: Type: IMPLANTABLE DEVICE | Site: HIP | Status: FUNCTIONAL

## 2021-03-03 DEVICE — STEM FEM SZ 6 L150MM 130DEG STD OFFSET CALCAR HIP BILAT TI: Type: IMPLANTABLE DEVICE | Site: HIP | Status: FUNCTIONAL

## 2021-03-03 DEVICE — HEAD BPLR OD43MM ID28MM FEM HIP GRY POS ECC SELF CNTR: Type: IMPLANTABLE DEVICE | Site: HIP | Status: FUNCTIONAL

## 2021-03-03 RX ORDER — GLYCOPYRROLATE 0.2 MG/ML
INJECTION INTRAMUSCULAR; INTRAVENOUS AS NEEDED
Status: DISCONTINUED | OUTPATIENT
Start: 2021-03-03 | End: 2021-03-03 | Stop reason: HOSPADM

## 2021-03-03 RX ORDER — ROCURONIUM BROMIDE 10 MG/ML
INJECTION, SOLUTION INTRAVENOUS AS NEEDED
Status: DISCONTINUED | OUTPATIENT
Start: 2021-03-03 | End: 2021-03-03 | Stop reason: HOSPADM

## 2021-03-03 RX ORDER — ACETAMINOPHEN 650 MG/1
650 SUPPOSITORY RECTAL EVERY 6 HOURS
Status: DISCONTINUED | OUTPATIENT
Start: 2021-03-03 | End: 2021-03-05

## 2021-03-03 RX ORDER — SODIUM CHLORIDE 0.9 % (FLUSH) 0.9 %
5-40 SYRINGE (ML) INJECTION EVERY 8 HOURS
Status: DISCONTINUED | OUTPATIENT
Start: 2021-03-03 | End: 2021-03-12 | Stop reason: HOSPADM

## 2021-03-03 RX ORDER — SUCCINYLCHOLINE CHLORIDE 20 MG/ML
INJECTION INTRAMUSCULAR; INTRAVENOUS AS NEEDED
Status: DISCONTINUED | OUTPATIENT
Start: 2021-03-03 | End: 2021-03-03 | Stop reason: HOSPADM

## 2021-03-03 RX ORDER — SODIUM CHLORIDE 0.9 % (FLUSH) 0.9 %
5-40 SYRINGE (ML) INJECTION AS NEEDED
Status: DISCONTINUED | OUTPATIENT
Start: 2021-03-03 | End: 2021-03-12 | Stop reason: HOSPADM

## 2021-03-03 RX ORDER — PROPOFOL 10 MG/ML
INJECTION, EMULSION INTRAVENOUS AS NEEDED
Status: DISCONTINUED | OUTPATIENT
Start: 2021-03-03 | End: 2021-03-03 | Stop reason: HOSPADM

## 2021-03-03 RX ORDER — DEXAMETHASONE SODIUM PHOSPHATE 4 MG/ML
INJECTION, SOLUTION INTRA-ARTICULAR; INTRALESIONAL; INTRAMUSCULAR; INTRAVENOUS; SOFT TISSUE AS NEEDED
Status: DISCONTINUED | OUTPATIENT
Start: 2021-03-03 | End: 2021-03-03 | Stop reason: HOSPADM

## 2021-03-03 RX ORDER — CALCIUM CARBONATE/VITAMIN D3 600MG-5MCG
1 TABLET ORAL
Status: DISCONTINUED | OUTPATIENT
Start: 2021-03-04 | End: 2021-03-12 | Stop reason: HOSPADM

## 2021-03-03 RX ORDER — CEFAZOLIN SODIUM 1 G/3ML
INJECTION, POWDER, FOR SOLUTION INTRAMUSCULAR; INTRAVENOUS AS NEEDED
Status: DISCONTINUED | OUTPATIENT
Start: 2021-03-03 | End: 2021-03-03 | Stop reason: HOSPADM

## 2021-03-03 RX ORDER — SODIUM CHLORIDE 9 MG/ML
125 INJECTION, SOLUTION INTRAVENOUS CONTINUOUS
Status: DISPENSED | OUTPATIENT
Start: 2021-03-03 | End: 2021-03-04

## 2021-03-03 RX ORDER — ACETAMINOPHEN 325 MG/1
650 TABLET ORAL EVERY 6 HOURS
Status: DISCONTINUED | OUTPATIENT
Start: 2021-03-03 | End: 2021-03-05

## 2021-03-03 RX ORDER — OXYCODONE HYDROCHLORIDE 5 MG/1
5 TABLET ORAL
Status: DISCONTINUED | OUTPATIENT
Start: 2021-03-03 | End: 2021-03-05

## 2021-03-03 RX ORDER — NALOXONE HYDROCHLORIDE 0.4 MG/ML
0.4 INJECTION, SOLUTION INTRAMUSCULAR; INTRAVENOUS; SUBCUTANEOUS AS NEEDED
Status: DISCONTINUED | OUTPATIENT
Start: 2021-03-03 | End: 2021-03-12 | Stop reason: HOSPADM

## 2021-03-03 RX ORDER — LIDOCAINE HYDROCHLORIDE 20 MG/ML
INJECTION, SOLUTION EPIDURAL; INFILTRATION; INTRACAUDAL; PERINEURAL AS NEEDED
Status: DISCONTINUED | OUTPATIENT
Start: 2021-03-03 | End: 2021-03-03 | Stop reason: HOSPADM

## 2021-03-03 RX ORDER — AMOXICILLIN 250 MG
1 CAPSULE ORAL 2 TIMES DAILY
Status: DISCONTINUED | OUTPATIENT
Start: 2021-03-03 | End: 2021-03-12 | Stop reason: HOSPADM

## 2021-03-03 RX ORDER — SODIUM CHLORIDE 0.9 % (FLUSH) 0.9 %
5-40 SYRINGE (ML) INJECTION EVERY 8 HOURS
Status: DISCONTINUED | OUTPATIENT
Start: 2021-03-03 | End: 2021-03-03 | Stop reason: HOSPADM

## 2021-03-03 RX ORDER — SODIUM CHLORIDE, SODIUM LACTATE, POTASSIUM CHLORIDE, CALCIUM CHLORIDE 600; 310; 30; 20 MG/100ML; MG/100ML; MG/100ML; MG/100ML
INJECTION, SOLUTION INTRAVENOUS
Status: DISCONTINUED | OUTPATIENT
Start: 2021-03-03 | End: 2021-03-03 | Stop reason: HOSPADM

## 2021-03-03 RX ORDER — DEXMEDETOMIDINE HYDROCHLORIDE 100 UG/ML
INJECTION, SOLUTION INTRAVENOUS AS NEEDED
Status: DISCONTINUED | OUTPATIENT
Start: 2021-03-03 | End: 2021-03-03 | Stop reason: HOSPADM

## 2021-03-03 RX ORDER — NEOSTIGMINE METHYLSULFATE 5 MG/5 ML
SYRINGE (ML) INTRAVENOUS AS NEEDED
Status: DISCONTINUED | OUTPATIENT
Start: 2021-03-03 | End: 2021-03-03 | Stop reason: HOSPADM

## 2021-03-03 RX ORDER — SODIUM CHLORIDE 0.9 % (FLUSH) 0.9 %
5-40 SYRINGE (ML) INJECTION AS NEEDED
Status: DISCONTINUED | OUTPATIENT
Start: 2021-03-03 | End: 2021-03-03 | Stop reason: HOSPADM

## 2021-03-03 RX ORDER — FACIAL-BODY WIPES
10 EACH TOPICAL DAILY PRN
Status: DISCONTINUED | OUTPATIENT
Start: 2021-03-05 | End: 2021-03-12 | Stop reason: HOSPADM

## 2021-03-03 RX ORDER — POLYETHYLENE GLYCOL 3350 17 G/17G
17 POWDER, FOR SOLUTION ORAL DAILY
Status: DISCONTINUED | OUTPATIENT
Start: 2021-03-04 | End: 2021-03-12 | Stop reason: HOSPADM

## 2021-03-03 RX ORDER — LIDOCAINE HYDROCHLORIDE 10 MG/ML
0.1 INJECTION, SOLUTION EPIDURAL; INFILTRATION; INTRACAUDAL; PERINEURAL AS NEEDED
Status: DISCONTINUED | OUTPATIENT
Start: 2021-03-03 | End: 2021-03-03 | Stop reason: HOSPADM

## 2021-03-03 RX ORDER — BUPIVACAINE HYDROCHLORIDE 2.5 MG/ML
INJECTION, SOLUTION EPIDURAL; INFILTRATION; INTRACAUDAL AS NEEDED
Status: DISCONTINUED | OUTPATIENT
Start: 2021-03-03 | End: 2021-03-03 | Stop reason: HOSPADM

## 2021-03-03 RX ORDER — HYDROMORPHONE HYDROCHLORIDE 1 MG/ML
INJECTION, SOLUTION INTRAMUSCULAR; INTRAVENOUS; SUBCUTANEOUS AS NEEDED
Status: DISCONTINUED | OUTPATIENT
Start: 2021-03-03 | End: 2021-03-03 | Stop reason: HOSPADM

## 2021-03-03 RX ORDER — FENTANYL CITRATE 50 UG/ML
INJECTION, SOLUTION INTRAMUSCULAR; INTRAVENOUS AS NEEDED
Status: DISCONTINUED | OUTPATIENT
Start: 2021-03-03 | End: 2021-03-03 | Stop reason: HOSPADM

## 2021-03-03 RX ORDER — MIDAZOLAM HYDROCHLORIDE 1 MG/ML
1 INJECTION, SOLUTION INTRAMUSCULAR; INTRAVENOUS AS NEEDED
Status: DISCONTINUED | OUTPATIENT
Start: 2021-03-03 | End: 2021-03-03 | Stop reason: HOSPADM

## 2021-03-03 RX ORDER — ONDANSETRON 2 MG/ML
INJECTION INTRAMUSCULAR; INTRAVENOUS AS NEEDED
Status: DISCONTINUED | OUTPATIENT
Start: 2021-03-03 | End: 2021-03-03 | Stop reason: HOSPADM

## 2021-03-03 RX ORDER — HYDROMORPHONE HYDROCHLORIDE 1 MG/ML
1 INJECTION, SOLUTION INTRAMUSCULAR; INTRAVENOUS; SUBCUTANEOUS ONCE
Status: COMPLETED | OUTPATIENT
Start: 2021-03-03 | End: 2021-03-03

## 2021-03-03 RX ORDER — FENTANYL CITRATE 50 UG/ML
50 INJECTION, SOLUTION INTRAMUSCULAR; INTRAVENOUS AS NEEDED
Status: DISCONTINUED | OUTPATIENT
Start: 2021-03-03 | End: 2021-03-03 | Stop reason: HOSPADM

## 2021-03-03 RX ORDER — LABETALOL HYDROCHLORIDE 5 MG/ML
INJECTION, SOLUTION INTRAVENOUS AS NEEDED
Status: DISCONTINUED | OUTPATIENT
Start: 2021-03-03 | End: 2021-03-03 | Stop reason: HOSPADM

## 2021-03-03 RX ADMIN — SIMVASTATIN 20 MG: 10 TABLET, FILM COATED ORAL at 23:39

## 2021-03-03 RX ADMIN — Medication 2 MG: at 16:22

## 2021-03-03 RX ADMIN — GLYCOPYRROLATE 0.2 MG: 0.2 INJECTION, SOLUTION INTRAMUSCULAR; INTRAVENOUS at 16:20

## 2021-03-03 RX ADMIN — PROPOFOL 100 MG: 10 INJECTION, EMULSION INTRAVENOUS at 14:38

## 2021-03-03 RX ADMIN — CEFAZOLIN SODIUM 2 G: 1 INJECTION, POWDER, FOR SOLUTION INTRAMUSCULAR; INTRAVENOUS at 14:55

## 2021-03-03 RX ADMIN — PHENYLEPHRINE HYDROCHLORIDE 200 MCG: 10 INJECTION INTRAVENOUS at 16:09

## 2021-03-03 RX ADMIN — DEXMEDETOMIDINE HYDROCHLORIDE 10 MCG: 100 INJECTION, SOLUTION INTRAVENOUS at 15:24

## 2021-03-03 RX ADMIN — Medication 10 ML: at 18:01

## 2021-03-03 RX ADMIN — Medication 1 MG: at 16:24

## 2021-03-03 RX ADMIN — ROCURONIUM BROMIDE 10 MG: 10 SOLUTION INTRAVENOUS at 14:38

## 2021-03-03 RX ADMIN — SUCCINYLCHOLINE CHLORIDE 140 MG: 20 INJECTION, SOLUTION INTRAMUSCULAR; INTRAVENOUS at 14:39

## 2021-03-03 RX ADMIN — CEFTRIAXONE SODIUM 1 G: 1 INJECTION, POWDER, FOR SOLUTION INTRAMUSCULAR; INTRAVENOUS at 23:40

## 2021-03-03 RX ADMIN — DEXMEDETOMIDINE HYDROCHLORIDE 10 MCG: 100 INJECTION, SOLUTION INTRAVENOUS at 15:29

## 2021-03-03 RX ADMIN — LIDOCAINE HYDROCHLORIDE 60 MG: 20 INJECTION, SOLUTION EPIDURAL; INFILTRATION; INTRACAUDAL; PERINEURAL at 14:38

## 2021-03-03 RX ADMIN — FENTANYL CITRATE 100 MCG: 50 INJECTION, SOLUTION INTRAMUSCULAR; INTRAVENOUS at 14:35

## 2021-03-03 RX ADMIN — OXYCODONE HYDROCHLORIDE 5 MG: 5 TABLET ORAL at 20:32

## 2021-03-03 RX ADMIN — DOCUSATE SODIUM 50 MG AND SENNOSIDES 8.6 MG 1 TABLET: 8.6; 5 TABLET, FILM COATED ORAL at 20:32

## 2021-03-03 RX ADMIN — ACETAMINOPHEN 650 MG: 325 TABLET, FILM COATED ORAL at 23:39

## 2021-03-03 RX ADMIN — DEXMEDETOMIDINE HYDROCHLORIDE 10 MCG: 100 INJECTION, SOLUTION INTRAVENOUS at 15:47

## 2021-03-03 RX ADMIN — ONDANSETRON 4 MG: 2 INJECTION INTRAMUSCULAR; INTRAVENOUS at 15:17

## 2021-03-03 RX ADMIN — SODIUM CHLORIDE, POTASSIUM CHLORIDE, SODIUM LACTATE AND CALCIUM CHLORIDE: 600; 310; 30; 20 INJECTION, SOLUTION INTRAVENOUS at 16:09

## 2021-03-03 RX ADMIN — DEXAMETHASONE SODIUM PHOSPHATE 4 MG: 4 INJECTION, SOLUTION INTRA-ARTICULAR; INTRALESIONAL; INTRAMUSCULAR; INTRAVENOUS; SOFT TISSUE at 15:14

## 2021-03-03 RX ADMIN — LABETALOL HYDROCHLORIDE 5 MG: 5 INJECTION INTRAVENOUS at 15:49

## 2021-03-03 RX ADMIN — ONDANSETRON 4 MG: 2 INJECTION INTRAMUSCULAR; INTRAVENOUS at 16:21

## 2021-03-03 RX ADMIN — ROCURONIUM BROMIDE 40 MG: 10 SOLUTION INTRAVENOUS at 15:04

## 2021-03-03 RX ADMIN — OXYBUTYNIN CHLORIDE 5 MG: 5 TABLET ORAL at 20:32

## 2021-03-03 RX ADMIN — HYDROMORPHONE HYDROCHLORIDE 1 MG: 1 INJECTION, SOLUTION INTRAMUSCULAR; INTRAVENOUS; SUBCUTANEOUS at 10:23

## 2021-03-03 RX ADMIN — SODIUM CHLORIDE 125 ML/HR: 9 INJECTION, SOLUTION INTRAVENOUS at 17:59

## 2021-03-03 RX ADMIN — MORPHINE SULFATE 2 MG: 2 INJECTION, SOLUTION INTRAMUSCULAR; INTRAVENOUS at 05:48

## 2021-03-03 RX ADMIN — CEFAZOLIN SODIUM 1 G: 1 INJECTION, POWDER, FOR SOLUTION INTRAMUSCULAR; INTRAVENOUS at 20:31

## 2021-03-03 RX ADMIN — HYDROMORPHONE HYDROCHLORIDE 1 MG: 1 INJECTION, SOLUTION INTRAMUSCULAR; INTRAVENOUS; SUBCUTANEOUS at 15:01

## 2021-03-03 NOTE — ANESTHESIA PREPROCEDURE EVALUATION
Relevant Problems   No relevant active problems       Anesthetic History   No history of anesthetic complications            Review of Systems / Medical History  Patient summary reviewed, nursing notes reviewed and pertinent labs reviewed    Pulmonary  Within defined limits                 Neuro/Psych       CVA  TIA     Cardiovascular    Hypertension          Hyperlipidemia    Exercise tolerance: >4 METS     GI/Hepatic/Renal     GERD    Renal disease: CRI       Endo/Other        Obesity, arthritis and anemia     Other Findings            Past Medical History:   Diagnosis Date    Arthritis     Burning with urination     GERD (gastroesophageal reflux disease)     Hypercholesterolemia     Hypertension     Long term current use of anticoagulant therapy     plavix qd    Stroke [facility])        Past Surgical History:   Procedure Laterality Date    HX KNEE REPLACEMENT Left 2015    HX KNEE REPLACEMENT Right 2018    total    HX ORTHOPAEDIC Right 04/2019    femur fx repair oh placement       Current Outpatient Medications   Medication Instructions    amLODIPine (NORVASC) 10 mg tablet Oral, DAILY    clopidogreL (Plavix) 75 mg tab Oral    cranberry extract 450 mg, Oral    furosemide (LASIX) 40 mg tablet Oral, DAILY    meloxicam (MOBIC) 15 mg, Oral, DAILY    multivitamin (ONE A DAY) tablet 1 Tab, Oral, DAILY    omeprazole (PRILOSEC) 40 mg, Oral, DAILY    oxybutynin (DITROPAN) 5 mg, Oral, 2 TIMES DAILY    simvastatin (ZOCOR) 20 mg tablet Oral, EVERY BEDTIME       Current Facility-Administered Medications   Medication Dose Route Frequency    0.9% sodium chloride infusion  75 mL/hr IntraVENous CONTINUOUS    morphine injection 2 mg  2 mg IntraVENous Q4H PRN    pantoprazole (PROTONIX) tablet 40 mg  40 mg Oral NOW    amLODIPine (NORVASC) tablet 10 mg  10 mg Oral DAILY    furosemide (LASIX) tablet 40 mg  40 mg Oral DAILY    multivitamin (ONE A DAY) tablet 1 Tab  1 Tab Oral DAILY    pantoprazole (PROTONIX) tablet 40 mg  40 mg Oral ACB    oxybutynin (DITROPAN) tablet 5 mg  5 mg Oral BID    simvastatin (ZOCOR) tablet 20 mg  20 mg Oral QHS    acetaminophen (TYLENOL) tablet 650 mg  650 mg Oral Q6H PRN    Or    acetaminophen (TYLENOL) suppository 650 mg  650 mg Rectal Q6H PRN    polyethylene glycol (MIRALAX) packet 17 g  17 g Oral DAILY PRN    ondansetron (ZOFRAN ODT) tablet 4 mg  4 mg Oral Q8H PRN    Or    ondansetron (ZOFRAN) injection 4 mg  4 mg IntraVENous Q6H PRN    enoxaparin (LOVENOX) injection 40 mg  40 mg SubCUTAneous DAILY    cefTRIAXone (ROCEPHIN) 1 g in sterile water (preservative free) 10 mL IV syringe  1 g IntraVENous Q24H       Patient Vitals for the past 24 hrs:   Temp Pulse Resp BP SpO2   03/03/21 1325  78 22 (!) 155/61 92 %   03/03/21 1043 36.9 °C (98.5 °F) 81 20 (!) 149/69 98 %   03/02/21 1759 36 °C (96.8 °F) 76 18 (!) 143/80 93 %       Lab Results   Component Value Date/Time    WBC 13.2 (H) 03/03/2021 06:00 AM    HGB 10.5 (L) 03/03/2021 06:00 AM    HCT 32.4 (L) 03/03/2021 06:00 AM    PLATELET 242 25/41/9579 06:00 AM    MCV 89.5 03/03/2021 06:00 AM     Lab Results   Component Value Date/Time    Sodium 139 03/03/2021 06:00 AM    Potassium 4.1 03/03/2021 06:00 AM    Chloride 106 03/03/2021 06:00 AM    CO2 26 03/03/2021 06:00 AM    Anion gap 7 03/03/2021 06:00 AM    Glucose 132 (H) 03/03/2021 06:00 AM    BUN 27 (H) 03/03/2021 06:00 AM    Creatinine 0.90 03/03/2021 06:00 AM    BUN/Creatinine ratio 30 (H) 03/03/2021 06:00 AM    GFR est AA >60 03/03/2021 06:00 AM    GFR est non-AA >60 03/03/2021 06:00 AM    Calcium 9.1 03/03/2021 06:00 AM     No results found for: APTT, PTP, INR, INREXT  Lab Results   Component Value Date/Time    Glucose 132 (H) 03/03/2021 06:00 AM     Physical Exam    Airway  Mallampati: II  TM Distance: 4 - 6 cm  Neck ROM: normal range of motion   Mouth opening: Normal     Cardiovascular    Rhythm: regular  Rate: normal         Dental  No notable dental hx       Pulmonary  Breath sounds clear to auscultation               Abdominal  GI exam deferred       Other Findings            Anesthetic Plan    ASA: 3, emergent  Anesthesia type: general          Induction: Intravenous  Anesthetic plan and risks discussed with: Patient and Family

## 2021-03-03 NOTE — ED NOTES
Spoke with Dr Herman Gonzalez about the patients request to be transferred to another hospital and he states that he will deal with this request in the morning nursing supervisor made aware of this

## 2021-03-03 NOTE — PROGRESS NOTES
Postoperative rounding done after right hip hemiarthroplasty  Hip pain is much better than before surgery  Hip pain is 4 out of 10  Complains of shoulder pain but better with sling and swath  No complaints of tingling or numbness      Assessment:  1. Postop day 0 right hip hemiarthroplasty  2. Displaced fracture proximal humerus, right      Plan/recommendations:  -Postoperative orders for the hemiarthroplasty placed  -We will check hemoglobin at 8 PM today. Will get 1 unit ready.   Transfusion if hemoglobin less than 9 to prepare her for surgery tomorrow  -Patient can eat dinner today and then n.p.o. from midnight  -Plan for ORIF of right proximal humerus tomorrow

## 2021-03-03 NOTE — ANESTHESIA PREPROCEDURE EVALUATION
Relevant Problems   No relevant active problems       Anesthetic History   No history of anesthetic complications            Review of Systems / Medical History  Patient summary reviewed, nursing notes reviewed and pertinent labs reviewed    Pulmonary  Within defined limits                 Neuro/Psych       CVA  TIA     Cardiovascular    Hypertension          Hyperlipidemia    Exercise tolerance: >4 METS     GI/Hepatic/Renal     GERD    Renal disease: CRI  Hiatal hernia     Endo/Other        Obesity, arthritis and anemia     Other Findings          Past Medical History:   Diagnosis Date    Arthritis     Burning with urination     GERD (gastroesophageal reflux disease)     Hypercholesterolemia     Hypertension     Long term current use of anticoagulant therapy     plavix qd    Stroke [facility])        Past Surgical History:   Procedure Laterality Date    HX KNEE REPLACEMENT Left 2015    HX KNEE REPLACEMENT Right 2018    total    HX ORTHOPAEDIC Right 04/2019    femur fx repair oh placement       Current Outpatient Medications   Medication Instructions    amLODIPine (NORVASC) 10 mg tablet Oral, DAILY    clopidogreL (Plavix) 75 mg tab Oral    cranberry extract 450 mg, Oral    furosemide (LASIX) 40 mg tablet Oral, DAILY    meloxicam (MOBIC) 15 mg, Oral, DAILY    multivitamin (ONE A DAY) tablet 1 Tab, Oral, DAILY    omeprazole (PRILOSEC) 40 mg, Oral, DAILY    oxybutynin (DITROPAN) 5 mg, Oral, 2 TIMES DAILY    simvastatin (ZOCOR) 20 mg tablet Oral, EVERY BEDTIME       No current facility-administered medications for this visit. No current outpatient medications on file.      Facility-Administered Medications Ordered in Other Visits   Medication Dose Route Frequency    tranexamic acid (CYKLOKAPRON) 1,000 mg in 0.9% sodium chloride 50 mL IVPB  1 g IntraVENous ONCE    acetaminophen (TYLENOL) tablet 650 mg  650 mg Oral Q6H    Or    acetaminophen (TYLENOL) suppository 650 mg  650 mg Rectal Q6H    0.9% sodium chloride infusion  125 mL/hr IntraVENous CONTINUOUS    sodium chloride (NS) flush 5-40 mL  5-40 mL IntraVENous Q8H    sodium chloride (NS) flush 5-40 mL  5-40 mL IntraVENous PRN    naloxone (NARCAN) injection 0.4 mg  0.4 mg IntraVENous PRN    [START ON 3/4/2021] calcium-vitamin D 600 mg(1,500mg) -200 unit per tablet 1 Tab  1 Tab Oral TID WITH MEALS    senna-docusate (PERICOLACE) 8.6-50 mg per tablet 1 Tab  1 Tab Oral BID    [START ON 3/4/2021] polyethylene glycol (MIRALAX) packet 17 g  17 g Oral DAILY    [START ON 3/5/2021] bisacodyL (DULCOLAX) suppository 10 mg  10 mg Rectal DAILY PRN    oxyCODONE IR (ROXICODONE) tablet 5 mg  5 mg Oral Q4H PRN    ceFAZolin (ANCEF) 1 g in sterile water (preservative free) 10 mL IV syringe  1 g IntraVENous Q8H    0.9% sodium chloride infusion  75 mL/hr IntraVENous CONTINUOUS    morphine injection 2 mg  2 mg IntraVENous Q4H PRN    pantoprazole (PROTONIX) tablet 40 mg  40 mg Oral NOW    amLODIPine (NORVASC) tablet 10 mg  10 mg Oral DAILY    furosemide (LASIX) tablet 40 mg  40 mg Oral DAILY    multivitamin (ONE A DAY) tablet 1 Tab  1 Tab Oral DAILY    pantoprazole (PROTONIX) tablet 40 mg  40 mg Oral ACB    oxybutynin (DITROPAN) tablet 5 mg  5 mg Oral BID    simvastatin (ZOCOR) tablet 20 mg  20 mg Oral QHS    polyethylene glycol (MIRALAX) packet 17 g  17 g Oral DAILY PRN    ondansetron (ZOFRAN ODT) tablet 4 mg  4 mg Oral Q8H PRN    Or    ondansetron (ZOFRAN) injection 4 mg  4 mg IntraVENous Q6H PRN    enoxaparin (LOVENOX) injection 40 mg  40 mg SubCUTAneous DAILY    cefTRIAXone (ROCEPHIN) 1 g in sterile water (preservative free) 10 mL IV syringe  1 g IntraVENous Q24H       No data found.     Lab Results   Component Value Date/Time    WBC 13.2 (H) 03/03/2021 06:00 AM    HGB 10.5 (L) 03/03/2021 06:00 AM    HCT 32.4 (L) 03/03/2021 06:00 AM    PLATELET 256 51/53/1608 06:00 AM    MCV 89.5 03/03/2021 06:00 AM     Lab Results   Component Value Date/Time    Sodium 139 03/03/2021 06:00 AM    Potassium 4.1 03/03/2021 06:00 AM    Chloride 106 03/03/2021 06:00 AM    CO2 26 03/03/2021 06:00 AM    Anion gap 7 03/03/2021 06:00 AM    Glucose 132 (H) 03/03/2021 06:00 AM    BUN 27 (H) 03/03/2021 06:00 AM    Creatinine 0.90 03/03/2021 06:00 AM    BUN/Creatinine ratio 30 (H) 03/03/2021 06:00 AM    GFR est AA >60 03/03/2021 06:00 AM    GFR est non-AA >60 03/03/2021 06:00 AM    Calcium 9.1 03/03/2021 06:00 AM     No results found for: APTT, PTP, INR, INREXT, INREXT  Lab Results   Component Value Date/Time    Glucose 132 (H) 03/03/2021 06:00 AM     Physical Exam    Airway  Mallampati: II  TM Distance: 4 - 6 cm  Neck ROM: normal range of motion   Mouth opening: Normal     Cardiovascular    Rhythm: regular  Rate: normal         Dental  No notable dental hx       Pulmonary  Breath sounds clear to auscultation               Abdominal  GI exam deferred       Other Findings            Anesthetic Plan    ASA: 3, emergent  Anesthesia type: general          Induction: Intravenous  Anesthetic plan and risks discussed with: Patient and Family      Anesthesia consent signed, witnessed,and placed on chart

## 2021-03-03 NOTE — PROGRESS NOTES
Spiritual Care Assessment/Progress Note  [facility]      NAME: Virginie Head      MRN: [id number]  AGE: 67 y.o.  SEX: female  Adventist Affiliation: No preference   Language: English     3/3/2021     Total Time (in minutes): 40     Spiritual Assessment begun in [address] [facility] through conversation with:         [x]Patient        [] Family    [] Friend(s)        Reason for Consult: Pre surgery consult     Spiritual beliefs: (Please include comment if needed)     [x] Identifies with a diana tradition:  Faith     [] Supported by a diana community:            [] Claims no spiritual orientation:           [] Seeking spiritual identity:                [] Adheres to an individual form of spirituality:           [] Not able to assess:                           Identified resources for coping:      [x] Prayer                               [] Music                  [] Guided Imagery     [] Family/friends                 [] Pet visits     [] Devotional reading                         [] Unknown     [] Other:                                              Interventions offered during this visit: (See comments for more details)    Patient Interventions: Prayer (actual), Normalization of emotional/spiritual concerns, Bridging, Affirmation of diana, Affirmation of emotions/emotional suffering, Catharsis/review of pertinent events in supportive environment           Plan of Care:     [] Support spiritual and/or cultural needs    [] Support AMD and/or advance care planning process      [] Support grieving process   [] Coordinate Rites and/or Rituals    [] Coordination with community clergy   [] No spiritual needs identified at this time   [] Detailed Plan of Care below (See Comments)  [] Make referral to Music Therapy  [] Make referral to Pet Therapy     [] Make referral to Addiction services  [] Make referral to [facility]  [] Make referral to Spiritual Care Partner  [] No future visits requested        [x] Follow up upon further referrals     Comments:  visited patient in PACU in response to patient's request for prayer prior to surgery.  and patient engaged in conversation re patient's hx of fractured femur and current fall and fracture of hip and shoulder. Patient requested a  but readily requested prayer from .  provided prayer and words of caring and support.  advised patient of availability of chaplains for follow up upon further referrals.      Visited by William Donnelly [facility], [facility]    can be contacted by calling  and requesting  on call

## 2021-03-03 NOTE — ED NOTES
Paged ortho at this time to ask about what splint is wanted for the patients injuries she states that a sling is all that is needed at this time. The patient is now requesting to be transferred to [facility] where her old orthopedic MD is Dr Ethel Mccauley. Paged Marcia at this time to discuss the request there was no answer a message was left at this time.

## 2021-03-03 NOTE — OP NOTES
Name of patient: Jesus Benson     MRN: [id number]    Date of surgery: 3/3/2021     Surgeon: Chana Freed MD    Assistant: OR scrub nurse    Pre-operative diagnosis: Displaced femoral neck fracture, right hip    Post operative diagnosis: Displaced femoral neck fracture, right hip    Procedure: Right hip hemiarthroplasty    Anesthesia: General    Complications: none    Blood loss: 350 cc    Specimen: None    IMPLANTS:   DePuy [place] stem size 6 standard offset  Outer head ball, 43 mm  Inner head ball 28 mm +1.5    Indication of the procedure: 67y.o. -year-old female patient fell down yesterday and was brought to the ER. X-rays demonstrated displaced femoral neck fracture. Treatment options were discussed and then she was explained for right hemiarthroplasty. See preoperative consult note for further indication. Procedure details: Patient as well as right lower extremity was identified and marked in the holding area. Patient was then brought to the operating room and positioned supine onto the operating table. General anesthesia was induced by anesthesia provider. Now patient was positioned left lateral with carefully padded hip positioner. 2 g of Ancef was started go 30 minutes prior to incision. Now standard prepping and draping was done. time-out was completed and agreed by everyone in the room. Now posterior lateral incision was made and dissection was carried through the adipose tissues up to the fascia. Fascia as well as gluteus stacey muscle was incised along the line of incision. Short external rotators including piriformis were identified and tagged for future repair. Abductors were retracted proximally. Now short external rotators as well as posterior capsule was detached from posterior trochanter. Capsule was tagged with Ethibond stitches. Now broken femoral head was removed using power corkscrew.   Head size was measured using template and then trial was done with the measured head ball.    Now attention was drawn to proximal femur. Retractors were placed. Freshening neck cut was made using the template. Now femoral canal was opened using appropriate instrumentation and then reaming was done up to size 6. Broaching was subsequently done sequentially up to size 6 and this broach was left into the canal as a trial stem. Using standard neck and +1.5 mm head ball, reduction was done. Stability was checked which suggested hip was stable up to 75 degrees of hip internal rotation at hip and knee flexed at 90 degrees. The leg length was also restored. Now all trial components were removed and real implants were impacted. Now plenty of irrigation was performed. Fascia was closed using strata fix. subcutaneous closure was done using 2-0 Vicryl and skin was opposed with 3-0 Monocryl. Steri-Strips and Aquasol dressing was applied. all sharp and sponge counts were correct. patient was then transferred to recovery room in stable condition. I was scrubbed in and performed the entire procedure by myself.     Jun Villafuerte MD

## 2021-03-03 NOTE — H&P
.  History and Physical    NAME: Callum Levin   :  1945   MRN:  [id number]     Date/Time:  3/3/2021 10:05 AM    Patient PCP: Robby Barclay, NP  ______________________________________________________________________    Elvin Muzzy with right femur fracture left humerus fracture         Subjective:     CHIEF COMPLAINT:     Fall with right femur fracture left humerus fracture    HISTORY OF PRESENT ILLNESS:       Patient is a 67y.o. year old female history of hypertension GERD hyperlipidemia history of CVA came to the ER after have a fall  Patient had a ground-level fall yesterday she lost her balance affording on a small rock denies any head injury came to the ER seen by the ER physician x-rays done shows left femur fracture and left humerus fracture and patient was recommend to be admitted for further evaluation treatment orthopedic was consulted patient have pain 8 out of 10 on morphine    Past Medical History:   Diagnosis Date    Arthritis     Burning with urination     GERD (gastroesophageal reflux disease)     Hypercholesterolemia     Hypertension     Long term current use of anticoagulant therapy     plavix qd    Stroke [facility])         Past Surgical History:   Procedure Laterality Date    HX KNEE REPLACEMENT Left     HX KNEE REPLACEMENT Right     total    HX ORTHOPAEDIC Right 2019    femur fx repair oh placement       Social History     Tobacco Use    Smoking status: Never Smoker    Smokeless tobacco: Never Used   Substance Use Topics    Alcohol use: Never     Frequency: Never        Family History   Problem Relation Age of Onset    Heart Disease Mother     Cancer Father         leukemia    Heart Disease Brother     Cancer Maternal Aunt         leukemia       No Known Allergies     Prior to Admission medications    Medication Sig Start Date End Date Taking? Authorizing Provider   cranberry extract 450 mg tab tablet Take 450 mg by mouth.    Yes Provider, Historical   omeprazole (PRILOSEC) 40 mg capsule Take 40 mg by mouth daily. Yes Provider, Historical   amLODIPine (NORVASC) 10 mg tablet Take  by mouth daily. Yes Provider, Historical   oxybutynin (DITROPAN) 5 mg tablet Take 5 mg by mouth two (2) times a day. Yes Provider, Historical   clopidogreL (Plavix) 75 mg tab Take  by mouth. Yes Provider, Historical   simvastatin (ZOCOR) 20 mg tablet Take  by mouth nightly. Yes Provider, Historical   furosemide (LASIX) 40 mg tablet Take  by mouth daily. Yes Provider, Historical   meloxicam (MOBIC) 15 mg tablet Take 15 mg by mouth daily. Yes Provider, Historical   multivitamin (ONE A DAY) tablet Take 1 Tab by mouth daily.    Yes Provider, Historical         Current Facility-Administered Medications:     0.9% sodium chloride infusion, 75 mL/hr, IntraVENous, CONTINUOUS, Kana Kennedy MD, Last Rate: 75 mL/hr at 03/02/21 2207, 75 mL/hr at 03/02/21 2207    morphine injection 2 mg, 2 mg, IntraVENous, Q4H PRN, Kana Kennedy MD, 2 mg at 03/03/21 0548    pantoprazole (PROTONIX) tablet 40 mg, 40 mg, Oral, NOW, Victoria Kennedy MD    amLODIPine (NORVASC) tablet 10 mg, 10 mg, Oral, DAILY, Victoria Kennedy MD    furosemide (LASIX) tablet 40 mg, 40 mg, Oral, DAILY, Victoria Kennedy MD    multivitamin (ONE A DAY) tablet 1 Tab, 1 Tab, Oral, DAILY, Victoria Kennedy MD    pantoprazole (PROTONIX) tablet 40 mg, 40 mg, Oral, ACB, Victoria Kennedy MD    oxybutynin [facility]) tablet 5 mg, 5 mg, Oral, BID, Victoria Kennedy MD, Stopped at 03/03/21 0000    simvastatin (ZOCOR) tablet 20 mg, 20 mg, Oral, QHS, Kana Kennedy MD, Stopped at 03/03/21 0000    acetaminophen (TYLENOL) tablet 650 mg, 650 mg, Oral, Q6H PRN **OR** acetaminophen (TYLENOL) suppository 650 mg, 650 mg, Rectal, Q6H PRN, Kana Kennedy MD    polyethylene glycol (MIRALAX) packet 17 g, 17 g, Oral, DAILY PRN, Kana Kennedy MD    ondansetron (ZOFRAN ODT) tablet 4 mg, 4 mg, Oral, Q8H PRN **OR** ondansetron ([place]) injection 4 mg, 4 mg, IntraVENous, Q6H PRN, Maryanne Kennedy MD    enoxaparin (LOVENOX) injection 40 mg, 40 mg, SubCUTAneous, DAILY, Maryanne Kennedy MD    cefTRIAXone (ROCEPHIN) 1 g in sterile water (preservative free) 10 mL IV syringe, 1 g, IntraVENous, Q24H, Kana Kennedy MD, 1 g at 03/02/21 2346    Current Outpatient Medications:     cranberry extract 450 mg tab tablet, Take 450 mg by mouth., Disp: , Rfl:     omeprazole (PRILOSEC) 40 mg capsule, Take 40 mg by mouth daily. , Disp: , Rfl:     amLODIPine (NORVASC) 10 mg tablet, Take  by mouth daily. , Disp: , Rfl:     oxybutynin (DITROPAN) 5 mg tablet, Take 5 mg by mouth two (2) times a day., Disp: , Rfl:     clopidogreL (Plavix) 75 mg tab, Take  by mouth., Disp: , Rfl:     simvastatin (ZOCOR) 20 mg tablet, Take  by mouth nightly., Disp: , Rfl:     furosemide (LASIX) 40 mg tablet, Take  by mouth daily. , Disp: , Rfl:     meloxicam (MOBIC) 15 mg tablet, Take 15 mg by mouth daily. , Disp: , Rfl:     multivitamin (ONE A DAY) tablet, Take 1 Tab by mouth daily. , Disp: , Rfl:     LAB DATA REVIEWED:    Recent Results (from the past 24 hour(s))   TYPE & SCREEN    Collection Time: 03/02/21  9:00 PM   Result Value Ref Range    Crossmatch Expiration 03/05/2021,2359     ABO/Rh(D) O Positive     Antibody screen Negative     Comment CERNER HX:  O POS    CBC WITH AUTOMATED DIFF    Collection Time: 03/02/21  9:00 PM   Result Value Ref Range    WBC 16.2 (H) 3.6 - 11.0 K/uL    RBC 3.80 3.80 - 5.20 M/uL    HGB 10.9 (L) 11.5 - 16.0 g/dL    HCT 33.8 (L) 35.0 - 47.0 %    MCV 88.9 80.0 - 99.0 FL    MCH 28.7 26.0 - 34.0 PG    MCHC 32.2 30.0 - 36.5 g/dL    RDW 13.3 11.5 - 14.5 %    PLATELET 775 200 - 301 K/uL    MPV 10.7 8.9 - 12.9 FL    NEUTROPHILS 89 (H) 32 - 75 %    LYMPHOCYTES 6 (L) 12 - 49 %    MONOCYTES 5 5 - 13 %    EOSINOPHILS 0 0 - 7 %    BASOPHILS 0 0 - 1 %    IMMATURE GRANULOCYTES 0 0.0 - 0.5 %    ABS. NEUTROPHILS 14.4 (H) 1.8 - 8.0 K/UL    ABS.  LYMPHOCYTES 1.0 0.8 - 3.5 K/UL    ABS. MONOCYTES 0.8 0.0 - 1.0 K/UL    ABS. EOSINOPHILS 0.0 0.0 - 0.4 K/UL    ABS. BASOPHILS 0.0 0.0 - 0.1 K/UL    ABS. IMM. GRANS. 0.1 (H) 0.00 - 0.04 K/UL    DF AUTOMATED     METABOLIC PANEL, COMPREHENSIVE    Collection Time: 03/02/21  9:00 PM   Result Value Ref Range    Sodium 142 136 - 145 mmol/L    Potassium 4.0 3.5 - 5.1 mmol/L    Chloride 107 97 - 108 mmol/L    CO2 24 21 - 32 mmol/L    Anion gap 11 5 - 15 mmol/L    Glucose 127 (H) 65 - 100 mg/dL    BUN 25 (H) 6 - 20 mg/dL    Creatinine 0.88 0.55 - 1.02 mg/dL    BUN/Creatinine ratio 28 (H) 12 - 20      GFR est AA >60 >60 ml/min/1.73m2    GFR est non-AA >60 >60 ml/min/1.73m2    Calcium 8.9 8.5 - 10.1 mg/dL    Bilirubin, total 0.3 0.2 - 1.0 mg/dL    AST (SGOT) 18 15 - 37 U/L    ALT (SGPT) 28 12 - 78 U/L    Alk.  phosphatase 80 45 - 117 U/L    Protein, total 7.2 6.4 - 8.2 g/dL    Albumin 3.7 3.5 - 5.0 g/dL    Globulin 3.5 2.0 - 4.0 g/dL    A-G Ratio 1.1 1.1 - 2.2     EKG, 12 LEAD, INITIAL    Collection Time: 03/02/21  9:53 PM   Result Value Ref Range    Ventricular Rate 80 BPM    Atrial Rate 80 BPM    P-R Interval 150 ms    QRS Duration 136 ms    Q-T Interval 424 ms    QTC Calculation (Bezet) 489 ms    Calculated P Axis 58 degrees    Calculated R Axis -29 degrees    Calculated T Axis 109 degrees    Diagnosis       Normal sinus rhythm  Left bundle branch block  Abnormal ECG  When compared with ECG of 16-JUN-2020 18:42,  No significant change was found  Confirmed by Fatemeh Pineda ([id number]) on 3/3/2021 6:58:12 AM     URINALYSIS W/MICROSCOPIC    Collection Time: 03/03/21  6:00 AM   Result Value Ref Range    Color Yellow/Straw      Appearance Clear Clear      Specific gravity 1.018 1.003 - 1.030      pH (UA) 6.0 5.0 - 8.0      Protein Negative Negative mg/dL    Glucose Negative Negative mg/dL    Ketone 5 (A) Negative mg/dL    Bilirubin Negative Negative      Blood Moderate (A) Negative      Urobilinogen 0.1 0.1 - 1.0 EU/dL    Nitrites Negative Negative Leukocyte Esterase Negative Negative      WBC 0-4 0 - 4 /hpf    RBC 20-50 0 - 5 /hpf    Bacteria Negative Negative /hpf    Mucus Trace /lpf   METABOLIC PANEL, COMPREHENSIVE    Collection Time: 03/03/21  6:00 AM   Result Value Ref Range    Sodium 139 136 - 145 mmol/L    Potassium 4.1 3.5 - 5.1 mmol/L    Chloride 106 97 - 108 mmol/L    CO2 26 21 - 32 mmol/L    Anion gap 7 5 - 15 mmol/L    Glucose 132 (H) 65 - 100 mg/dL    BUN 27 (H) 6 - 20 mg/dL    Creatinine 0.90 0.55 - 1.02 mg/dL    BUN/Creatinine ratio 30 (H) 12 - 20      GFR est AA >60 >60 ml/min/1.73m2    GFR est non-AA >60 >60 ml/min/1.73m2    Calcium 9.1 8.5 - 10.1 mg/dL    Bilirubin, total 0.3 0.2 - 1.0 mg/dL    AST (SGOT) 18 15 - 37 U/L    ALT (SGPT) 27 12 - 78 U/L    Alk. phosphatase 77 45 - 117 U/L    Protein, total 7.2 6.4 - 8.2 g/dL    Albumin 3.6 3.5 - 5.0 g/dL    Globulin 3.6 2.0 - 4.0 g/dL    A-G Ratio 1.0 (L) 1.1 - 2.2     CBC WITH AUTOMATED DIFF    Collection Time: 03/03/21  6:00 AM   Result Value Ref Range    WBC 13.2 (H) 3.6 - 11.0 K/uL    RBC 3.62 (L) 3.80 - 5.20 M/uL    HGB 10.5 (L) 11.5 - 16.0 g/dL    HCT 32.4 (L) 35.0 - 47.0 %    MCV 89.5 80.0 - 99.0 FL    MCH 29.0 26.0 - 34.0 PG    MCHC 32.4 30.0 - 36.5 g/dL    RDW 13.4 11.5 - 14.5 %    PLATELET 766 976 - 663 K/uL    MPV 11.0 8.9 - 12.9 FL    NEUTROPHILS 80 (H) 32 - 75 %    LYMPHOCYTES 13 12 - 49 %    MONOCYTES 7 5 - 13 %    EOSINOPHILS 0 0 - 7 %    BASOPHILS 0 0 - 1 %    IMMATURE GRANULOCYTES 0 0.0 - 0.5 %    ABS. NEUTROPHILS 10.5 (H) 1.8 - 8.0 K/UL    ABS. LYMPHOCYTES 1.7 0.8 - 3.5 K/UL    ABS. MONOCYTES 1.0 0.0 - 1.0 K/UL    ABS. EOSINOPHILS 0.0 0.0 - 0.4 K/UL    ABS. BASOPHILS 0.0 0.0 - 0.1 K/UL    ABS. IMM. GRANS. 0.1 (H) 0.00 - 0.04 K/UL    DF AUTOMATED         XR Results (most recent):  Results from Hospital Encounter encounter on 03/02/21   XR FEMUR RT 2 VS    Narrative History: Fracture evaluation. 2 views.     AP view that includes the proximal femur and pelvis as well as AP and lateral  views of the mid and distal femur show acute fracture of the subcapital right  femoral neck. There is one shaft with displacement with resultant varus  angulation. Contour deformity of the distal femur in keeping with old healed fracture. Constrained right knee arthroplasty. Impression 1. Acute subcapital femoral neck fracture on the right. 2. Old distal femoral metadiaphysis fracture. 3. Right knee arthroplasty. XR FEMUR RT 2 VS   Final Result   1. Acute subcapital femoral neck fracture on the right. 2. Old distal femoral metadiaphysis fracture. 3. Right knee arthroplasty. XR CHEST PORT   Final Result   1. Underexpanded lungs with minimal left basilar atelectasis. 2. Hiatal hernia. 3. Fracture left proximal humerus which may be acute. XR HIP RT W OR WO PELV 2-3 VWS   Final Result   FINDINGS: IMPRESSION: AP pelvis and limited lateral versus frog-leg imaging of   the right hip. Right femoral neck fracture with superior migration of the long bone fragment. No dislocation. Incomplete imaging of  mid femoral diaphyseal deformity from remote fracture. Intramedullary femoral oh has been removed since 2018. XR SHOULDER RT AP/LAT MIN 2 V   Final Result   FINDINGS: IMPRESSION: 3 images of the right shoulder. Transverse fracture of the humerus neck with almost complete displacement of the   long bone fragment medially. The humerus head maintains gross alignment with the   bony glenoid. Review of Systems:  Constitutional: Negative for chills and fever. HENT: Negative. Eyes: Negative. Respiratory: Negative. Cardiovascular: Negative. Gastrointestinal: Negative for abdominal pain and nausea. Skin: Negative. Neurological: Negative.    Right hip pain and left shoulder pain    Objective:   VITALS:    Visit Vitals  BP (!) 143/80 (BP Patient Position: At rest)   Pulse 76   Temp 96.8 °F (36 °C)   Resp 18   Ht 5' (1.524 m)   Wt 75.8 kg (167 lb)   SpO2 93%   BMI 32.61 kg/m²       Physical Exam:   Constitutional: pt is oriented to person, place, and time. HENT:   Head: Normocephalic and atraumatic. Eyes: Pupils are equal, round, and reactive to light. EOM are normal.   Cardiovascular: Normal rate, regular rhythm and normal heart sounds. Pulmonary/Chest: Breath sounds normal. No wheezes. No rales. Exhibits no tenderness. Abdominal: Soft. Bowel sounds are normal. There is no abdominal tenderness. There is no rebound and no guarding. Musculoskeletal: Right femur fracture and left humerus fracture pain  Neurological: pt is alert and oriented to person, place, and time. Alert. Normal strength. No cranial nerve deficit or sensory deficit. Displays a negative Romberg sign.         ASSESSMENT & PLAN:    Right femur fracture  Left humerus fracture  GERD  Hyperlipidemia  History of CVA        Current Facility-Administered Medications:     0.9% sodium chloride infusion, 75 mL/hr, IntraVENous, CONTINUOUS, Kana Kennedy MD, Last Rate: 75 mL/hr at 03/02/21 2207, 75 mL/hr at 03/02/21 2207    morphine injection 2 mg, 2 mg, IntraVENous, Q4H PRN, Kana Kennedy MD, 2 mg at 03/03/21 0548    pantoprazole (PROTONIX) tablet 40 mg, 40 mg, Oral, NOW, Kana Kennedy MD    amLODIPine (NORVASC) tablet 10 mg, 10 mg, Oral, DAILY, Solomon Kennedy MD    furosemide (LASIX) tablet 40 mg, 40 mg, Oral, DAILY, Kana Kennedy MD    multivitamin (ONE A DAY) tablet 1 Tab, 1 Tab, Oral, DAILY, Kana Kennedy MD    pantoprazole (PROTONIX) tablet 40 mg, 40 mg, Oral, ACB, Kana Kennedy MD    oxybutynin [facility]) tablet 5 mg, 5 mg, Oral, BID, Kana Kennedy MD, Stopped at 03/03/21 0000    simvastatin (ZOCOR) tablet 20 mg, 20 mg, Oral, QHS, Kana Kennedy MD, Stopped at 03/03/21 0000    acetaminophen (TYLENOL) tablet 650 mg, 650 mg, Oral, Q6H PRN **OR** acetaminophen (TYLENOL) suppository 650 mg, 650 mg, Rectal, Q6H PRN, Deandra Wang MD  [facility] polyethylene glycol (MIRALAX) packet 17 g, 17 g, Oral, DAILY PRN, Mary Kennedy MD    ondansetron (ZOFRAN ODT) tablet 4 mg, 4 mg, Oral, Q8H PRN **OR** ondansetron (ZOFRAN) injection 4 mg, 4 mg, IntraVENous, Q6H PRN, Kana Kennedy MD    enoxaparin (LOVENOX) injection 40 mg, 40 mg, SubCUTAneous, DAILY, Mary Kennedy MD    cefTRIAXone (ROCEPHIN) 1 g in sterile water (preservative free) 10 mL IV syringe, 1 g, IntraVENous, Q24H, Kana Kennedy MD, 1 g at 03/02/21 3657    Current Outpatient Medications:     cranberry extract 450 mg tab tablet, Take 450 mg by mouth., Disp: , Rfl:     omeprazole (PRILOSEC) 40 mg capsule, Take 40 mg by mouth daily. , Disp: , Rfl:     amLODIPine (NORVASC) 10 mg tablet, Take  by mouth daily. , Disp: , Rfl:     oxybutynin (DITROPAN) 5 mg tablet, Take 5 mg by mouth two (2) times a day., Disp: , Rfl:     clopidogreL (Plavix) 75 mg tab, Take  by mouth., Disp: , Rfl:     simvastatin (ZOCOR) 20 mg tablet, Take  by mouth nightly., Disp: , Rfl:     furosemide (LASIX) 40 mg tablet, Take  by mouth daily. , Disp: , Rfl:     meloxicam (MOBIC) 15 mg tablet, Take 15 mg by mouth daily. , Disp: , Rfl:     multivitamin (ONE A DAY) tablet, Take 1 Tab by mouth daily. , Disp: , Rfl:       Discussed with the patient in detail she want to be transferred to and record Dr. With Dr. Bassam De Los Santos    I talked to months ago transfer center and all information was given    I talked to Dr. Rich Buenrostro our orthopedic physician update of possible transfer      ________________________________________________________________________    Signed: Linda Huynh MD

## 2021-03-03 NOTE — CONSULTS
Consult    Subjective: Melissa Crews is a 67 y.o. female patient fell down yesterday while going to the mailbox. Patient was brought to emergency room where x-rays suggested displaced femoral neck fracture and displaced proximal humerus fracture. Initial consult was sent to my partner Dr. Jordan Sanchez. Patient at that time wanted to decide to go to different hospital but then eventually she decided to stay here so I was consulted for the same. Complains of pain around the hip  10 out of 10 with any movement of the leg  Not able to move the leg because of pain  No complaints of tingling or numbness in the right leg      Also complains of pain around right shoulder  7 out of 10  Dull aching  Any movement of right upper extremity is painful    Patient had distal femur fracture which required retrograde nail approximately 17 years ago. Subsequently she was undergone removal of intramedullary nail and right total knee arthroplasty by Dr. Cong Lopez. She also was undergone primary and subsequently revision left knee surgeries the last knee surgery was done by Dr. Senia Baeza.      Past Medical History:   Diagnosis Date    Arthritis     Burning with urination     GERD (gastroesophageal reflux disease)     Hypercholesterolemia     Hypertension     Long term current use of anticoagulant therapy     plavix qd    Stroke [facility])       Past Surgical History:   Procedure Laterality Date    HX KNEE REPLACEMENT Left 2015    HX KNEE REPLACEMENT Right 2018    total    HX ORTHOPAEDIC Right 04/2019    femur fx repair oh placement     Family History   Problem Relation Age of Onset    Heart Disease Mother     Cancer Father         leukemia    Heart Disease Brother     Cancer Maternal Aunt         leukemia      Social History     Tobacco Use    Smoking status: Never Smoker    Smokeless tobacco: Never Used   Substance Use Topics    Alcohol use: Never     Frequency: Never       Current Facility-Administered Medications Medication Dose Route Frequency Provider Last Rate Last Admin    sodium chloride (NS) flush 5-40 mL  5-40 mL IntraVENous Q8H Aminah Ba MD        sodium chloride (NS) flush 5-40 mL  5-40 mL IntraVENous PRN Aminah Ba MD        lidocaine (PF) (XYLOCAINE) 10 mg/mL (1 %) injection 0.1 mL  0.1 mL SubCUTAneous PRN Aminah Ba MD        fentaNYL citrate (PF) injection 50 mcg  50 mcg IntraVENous PRN Aminah Ba MD        midazolam (VERSED) injection 1 mg  1 mg IntraVENous PRN Aminah Ba MD        0.9% sodium chloride infusion  75 mL/hr IntraVENous CONTINUOUS Kana Kennedy MD 75 mL/hr at 03/02/21 2207 75 mL/hr at 03/02/21 2207    morphine injection 2 mg  2 mg IntraVENous Q4H PRN Pasha Kennedy MD   2 mg at 03/03/21 0548    pantoprazole (PROTONIX) tablet 40 mg  40 mg Oral NOW Pasha Kennedy MD        amLODIPine (NORVASC) tablet 10 mg  10 mg Oral DAILY Pasha Kennedy MD        furosemide (LASIX) tablet 40 mg  40 mg Oral DAILY Pasha Kennedy MD        multivitamin (ONE A DAY) tablet 1 Tab  1 Tab Oral DAILY Pahsa Kennedy MD        pantoprazole (PROTONIX) tablet 40 mg  40 mg Oral ACB Pasha Kennedy MD        oxybutynin [facility]) tablet 5 mg  5 mg Oral BID Rich Harris MD   Stopped at 03/03/21 0000    simvastatin (ZOCOR) tablet 20 mg  20 mg Oral QHS Kana Kennedy MD   Stopped at 03/03/21 0000    acetaminophen (TYLENOL) tablet 650 mg  650 mg Oral Q6H PRN Pasha Kennedy MD        Or   [place] [facility] acetaminophen (TYLENOL) suppository 650 mg  650 mg Rectal Q6H PRN Pasha Kennedy MD        polyethylene glycol (MIRALAX) packet 17 g  17 g Oral DAILY PRN Pasha Kennedy MD        ondansetron (ZOFRAN ODT) tablet 4 mg  4 mg Oral Q8H PRN Kana Kennedy MD        Or    ondansetron [facility]) injection 4 mg  4 mg IntraVENous Q6H PRN Pasha Kennedy MD        enoxaparin (LOVENOX) injection 40 mg  40 mg SubCUTAneous DAILY Pasha Kennedy MD        cefTRIAXone (ROCEPHIN) 1 g in sterile water (preservative free) 10 mL IV syringe  1 g IntraVENous Q24H Kana Kennedy MD   1 g at 03/02/21 2346        No Known Allergies     Review of Systems:  Patient is alert and oriented x3    Objective:     Physical Exam:   Examination of right lower extremity:  Right lower extremity is externally rotated and shortened  Tenderness around the hip  Any movement of right lower extremity is extremely painful  Toes and ankle movement full  Dorsalis pedis and posterior tibial +1  Sensation intact  Previous surgical incision for the knee and distal femur surgery has healed    Examination of right upper extremity:  Diffuse tenderness around right shoulder  Diffuse swelling around the shoulder  Fracture deformity present  Any movement of extremity is extremely painful  Radial pulse +2  Sensation intact    Data Review:   Recent Results (from the past 24 hour(s))   TYPE & SCREEN    Collection Time: 03/02/21  9:00 PM   Result Value Ref Range    Crossmatch Expiration 03/05/2021,2359     ABO/Rh(D) O Positive     Antibody screen Negative     Comment CERNER HX:  O POS    CBC WITH AUTOMATED DIFF    Collection Time: 03/02/21  9:00 PM   Result Value Ref Range    WBC 16.2 (H) 3.6 - 11.0 K/uL    RBC 3.80 3.80 - 5.20 M/uL    HGB 10.9 (L) 11.5 - 16.0 g/dL    HCT 33.8 (L) 35.0 - 47.0 %    MCV 88.9 80.0 - 99.0 FL    MCH 28.7 26.0 - 34.0 PG    MCHC 32.2 30.0 - 36.5 g/dL    RDW 13.3 11.5 - 14.5 %    PLATELET 869 881 - 867 K/uL    MPV 10.7 8.9 - 12.9 FL    NEUTROPHILS 89 (H) 32 - 75 %    LYMPHOCYTES 6 (L) 12 - 49 %    MONOCYTES 5 5 - 13 %    EOSINOPHILS 0 0 - 7 %    BASOPHILS 0 0 - 1 %    IMMATURE GRANULOCYTES 0 0.0 - 0.5 %    ABS. NEUTROPHILS 14.4 (H) 1.8 - 8.0 K/UL    ABS. LYMPHOCYTES 1.0 0.8 - 3.5 K/UL    ABS. MONOCYTES 0.8 0.0 - 1.0 K/UL    ABS. EOSINOPHILS 0.0 0.0 - 0.4 K/UL    ABS. BASOPHILS 0.0 0.0 - 0.1 K/UL    ABS. IMM.  GRANS. 0.1 (H) 0.00 - 0.04 K/UL    DF AUTOMATED     METABOLIC PANEL, COMPREHENSIVE Collection Time: 03/02/21  9:00 PM   Result Value Ref Range    Sodium 142 136 - 145 mmol/L    Potassium 4.0 3.5 - 5.1 mmol/L    Chloride 107 97 - 108 mmol/L    CO2 24 21 - 32 mmol/L    Anion gap 11 5 - 15 mmol/L    Glucose 127 (H) 65 - 100 mg/dL    BUN 25 (H) 6 - 20 mg/dL    Creatinine 0.88 0.55 - 1.02 mg/dL    BUN/Creatinine ratio 28 (H) 12 - 20      GFR est AA >60 >60 ml/min/1.73m2    GFR est non-AA >60 >60 ml/min/1.73m2    Calcium 8.9 8.5 - 10.1 mg/dL    Bilirubin, total 0.3 0.2 - 1.0 mg/dL    AST (SGOT) 18 15 - 37 U/L    ALT (SGPT) 28 12 - 78 U/L    Alk.  phosphatase 80 45 - 117 U/L    Protein, total 7.2 6.4 - 8.2 g/dL    Albumin 3.7 3.5 - 5.0 g/dL    Globulin 3.5 2.0 - 4.0 g/dL    A-G Ratio 1.1 1.1 - 2.2     EKG, 12 LEAD, INITIAL    Collection Time: 03/02/21  9:53 PM   Result Value Ref Range    Ventricular Rate 80 BPM    Atrial Rate 80 BPM    P-R Interval 150 ms    QRS Duration 136 ms    Q-T Interval 424 ms    QTC Calculation (Bezet) 489 ms    Calculated P Axis 58 degrees    Calculated R Axis -29 degrees    Calculated T Axis 109 degrees    Diagnosis       Normal sinus rhythm  Left bundle branch block  Abnormal ECG  When compared with ECG of 16-JUN-2020 18:42,  No significant change was found  Confirmed by Marichuy Laguna ([id number]) on 3/3/2021 6:58:12 AM     URINALYSIS W/MICROSCOPIC    Collection Time: 03/03/21  6:00 AM   Result Value Ref Range    Color Yellow/Straw      Appearance Clear Clear      Specific gravity 1.018 1.003 - 1.030      pH (UA) 6.0 5.0 - 8.0      Protein Negative Negative mg/dL    Glucose Negative Negative mg/dL    Ketone 5 (A) Negative mg/dL    Bilirubin Negative Negative      Blood Moderate (A) Negative      Urobilinogen 0.1 0.1 - 1.0 EU/dL    Nitrites Negative Negative      Leukocyte Esterase Negative Negative      WBC 0-4 0 - 4 /hpf    RBC 20-50 0 - 5 /hpf    Bacteria Negative Negative /hpf    Mucus Trace /lpf   METABOLIC PANEL, COMPREHENSIVE    Collection Time: 03/03/21  6:00 AM   Result Value Ref Range    Sodium 139 136 - 145 mmol/L    Potassium 4.1 3.5 - 5.1 mmol/L    Chloride 106 97 - 108 mmol/L    CO2 26 21 - 32 mmol/L    Anion gap 7 5 - 15 mmol/L    Glucose 132 (H) 65 - 100 mg/dL    BUN 27 (H) 6 - 20 mg/dL    Creatinine 0.90 0.55 - 1.02 mg/dL    BUN/Creatinine ratio 30 (H) 12 - 20      GFR est AA >60 >60 ml/min/1.73m2    GFR est non-AA >60 >60 ml/min/1.73m2    Calcium 9.1 8.5 - 10.1 mg/dL    Bilirubin, total 0.3 0.2 - 1.0 mg/dL    AST (SGOT) 18 15 - 37 U/L    ALT (SGPT) 27 12 - 78 U/L    Alk. phosphatase 77 45 - 117 U/L    Protein, total 7.2 6.4 - 8.2 g/dL    Albumin 3.6 3.5 - 5.0 g/dL    Globulin 3.6 2.0 - 4.0 g/dL    A-G Ratio 1.0 (L) 1.1 - 2.2     CBC WITH AUTOMATED DIFF    Collection Time: 03/03/21  6:00 AM   Result Value Ref Range    WBC 13.2 (H) 3.6 - 11.0 K/uL    RBC 3.62 (L) 3.80 - 5.20 M/uL    HGB 10.5 (L) 11.5 - 16.0 g/dL    HCT 32.4 (L) 35.0 - 47.0 %    MCV 89.5 80.0 - 99.0 FL    MCH 29.0 26.0 - 34.0 PG    MCHC 32.4 30.0 - 36.5 g/dL    RDW 13.4 11.5 - 14.5 %    PLATELET 591 411 - 243 K/uL    MPV 11.0 8.9 - 12.9 FL    NEUTROPHILS 80 (H) 32 - 75 %    LYMPHOCYTES 13 12 - 49 %    MONOCYTES 7 5 - 13 %    EOSINOPHILS 0 0 - 7 %    BASOPHILS 0 0 - 1 %    IMMATURE GRANULOCYTES 0 0.0 - 0.5 %    ABS. NEUTROPHILS 10.5 (H) 1.8 - 8.0 K/UL    ABS. LYMPHOCYTES 1.7 0.8 - 3.5 K/UL    ABS. MONOCYTES 1.0 0.0 - 1.0 K/UL    ABS. EOSINOPHILS 0.0 0.0 - 0.4 K/UL    ABS. BASOPHILS 0.0 0.0 - 0.1 K/UL    ABS. IMM. GRANS. 0.1 (H) 0.00 - 0.04 K/UL    DF AUTOMATED         Imaging:  X-ray AP pelvis with 2 views of the hip were done in ER. I reviewed x-rays personally. There is displaced grade 4 femoral neck fracture on the right hip. Femur x-rays were also done which suggested revision knee arthroplasty implants in situ. Evidence of previous intramedullary nailing with healed distal femur fracture. X-ray 2 views of the  shoulder reviewed. There is completely displaced proximal humerus fracture.     Assessment: 1.  Displaced femoral neck fracture, right  2. Displaced proximal humerus fracture, right    Plan:     - X-rays and clinical findings discussed with the patient  -We discussed treatment options for right femoral neck and right proximal humerus fractures.  -Patient was ambulating and independent surgical treatment for the femoral neck fracture is the best option. I discussed hemiarthroplasty of the right hip. Proximal humerus fracture is also completely displaced which usually requires surgical treatment. Patient will have to use the walker for recovering the femoral neck fracture so fixation of proximal humerus fracture makes more sense than nonoperative treatment. All this discussion was done with patient and she understood and agreed to proceed with surgical treatment for both fractures.  -I discussed that both surgeries require different type of table. Becomes difficult to proceed with surgical treatment of both fractures under one setting so we will proceed with femoral neck fracture today and the proximal humerus fracture will be fixed in the next few days the earliest possible.  -Risk and benefits of proximal humerus fracture discussed. Possible complications including but not limited to infection, failure of fixation, nonunion of the fracture, malunion of the fracture, nerve or vessels injury, tendon injury and stiffness of the shoulder were discussed.  -Possible complications of hemiarthroplasty of the hip were discussed including but not limited to infection, wound healing complication, dislocation, additional fracture, leg length discrepancy, nerve or vessels injury, tendon injury and blood clot in the lungs or legs were discussed.   -Patient has been admitted under hospitalist service for medical management. -Type and  screen has been ordered  -All questions were answered satisfactorily. We will proceed with right hip hemiarthroplasty today.   Right proximal humerus fixation will be deferred for next 2 to 3 days.  -Patient understood all discussion . Informed return consent was obtained.

## 2021-03-04 ENCOUNTER — APPOINTMENT (OUTPATIENT)
Dept: GENERAL RADIOLOGY | Age: 76
DRG: 522 | End: 2021-03-04
Attending: ORTHOPAEDIC SURGERY
Payer: MEDICARE

## 2021-03-04 ENCOUNTER — ANESTHESIA (OUTPATIENT)
Dept: SURGERY | Age: 76
DRG: 522 | End: 2021-03-04
Payer: MEDICARE

## 2021-03-04 LAB
ANION GAP SERPL CALC-SCNC: 7 MMOL/L (ref 5–15)
ATRIAL RATE: 76 BPM
BACTERIA SPEC CULT: NORMAL
BUN SERPL-MCNC: 26 MG/DL (ref 6–20)
BUN/CREAT SERPL: 28 (ref 12–20)
CA-I BLD-MCNC: 8.1 MG/DL (ref 8.5–10.1)
CALCULATED P AXIS, ECG09: 32 DEGREES
CALCULATED R AXIS, ECG10: -34 DEGREES
CALCULATED T AXIS, ECG11: 116 DEGREES
CHLORIDE SERPL-SCNC: 110 MMOL/L (ref 97–108)
CO2 SERPL-SCNC: 24 MMOL/L (ref 21–32)
CREAT SERPL-MCNC: 0.94 MG/DL (ref 0.55–1.02)
DIAGNOSIS, 93000: NORMAL
GLUCOSE SERPL-MCNC: 130 MG/DL (ref 65–100)
P-R INTERVAL, ECG05: 140 MS
POTASSIUM SERPL-SCNC: 4.3 MMOL/L (ref 3.5–5.1)
Q-T INTERVAL, ECG07: 418 MS
QRS DURATION, ECG06: 144 MS
QTC CALCULATION (BEZET), ECG08: 470 MS
SODIUM SERPL-SCNC: 141 MMOL/L (ref 136–145)
SPECIAL REQUESTS,SREQ: NORMAL
VENTRICULAR RATE, ECG03: 76 BPM

## 2021-03-04 PROCEDURE — 77030018712 HC DEV BLLN INFL BSC -B: Performed by: ORTHOPAEDIC SURGERY

## 2021-03-04 PROCEDURE — 77030018673: Performed by: ORTHOPAEDIC SURGERY

## 2021-03-04 PROCEDURE — C1713 ANCHOR/SCREW BN/BN,TIS/BN: HCPCS | Performed by: ORTHOPAEDIC SURGERY

## 2021-03-04 PROCEDURE — 74011000258 HC RX REV CODE- 258: Performed by: NURSE ANESTHETIST, CERTIFIED REGISTERED

## 2021-03-04 PROCEDURE — 77030026438 HC STYL ET INTUB CARD -A: Performed by: NURSE ANESTHETIST, CERTIFIED REGISTERED

## 2021-03-04 PROCEDURE — 36415 COLL VENOUS BLD VENIPUNCTURE: CPT

## 2021-03-04 PROCEDURE — 77030008832 HC WRE K ZIMM -B: Performed by: ORTHOPAEDIC SURGERY

## 2021-03-04 PROCEDURE — 77030003915: Performed by: ORTHOPAEDIC SURGERY

## 2021-03-04 PROCEDURE — 77030008684 HC TU ET CUF COVD -B: Performed by: NURSE ANESTHETIST, CERTIFIED REGISTERED

## 2021-03-04 PROCEDURE — 76210000016 HC OR PH I REC 1 TO 1.5 HR: Performed by: ORTHOPAEDIC SURGERY

## 2021-03-04 PROCEDURE — 65270000029 HC RM PRIVATE

## 2021-03-04 PROCEDURE — 74011000250 HC RX REV CODE- 250: Performed by: ORTHOPAEDIC SURGERY

## 2021-03-04 PROCEDURE — 76000 FLUOROSCOPY <1 HR PHYS/QHP: CPT

## 2021-03-04 PROCEDURE — 77030040875 HC CAUT BATTRY MDII -A: Performed by: ORTHOPAEDIC SURGERY

## 2021-03-04 PROCEDURE — 77030012935 HC DRSG AQUACEL BMS -B: Performed by: ORTHOPAEDIC SURGERY

## 2021-03-04 PROCEDURE — 97163 PT EVAL HIGH COMPLEX 45 MIN: CPT

## 2021-03-04 PROCEDURE — 77030008463 HC STPLR SKN PROX J&J -B: Performed by: ORTHOPAEDIC SURGERY

## 2021-03-04 PROCEDURE — 77030002922 HC SUT FBRWRE ARTH -B: Performed by: ORTHOPAEDIC SURGERY

## 2021-03-04 PROCEDURE — 77030008975 HC BN CANC CHP LIFV -E: Performed by: ORTHOPAEDIC SURGERY

## 2021-03-04 PROCEDURE — 73030 X-RAY EXAM OF SHOULDER: CPT

## 2021-03-04 PROCEDURE — 77030019908 HC STETH ESOPH SIMS -A: Performed by: NURSE ANESTHETIST, CERTIFIED REGISTERED

## 2021-03-04 PROCEDURE — 74011000250 HC RX REV CODE- 250: Performed by: FAMILY MEDICINE

## 2021-03-04 PROCEDURE — L3670 SO ACRO/CLAV CAN WEB PRE OTS: HCPCS | Performed by: ORTHOPAEDIC SURGERY

## 2021-03-04 PROCEDURE — 74011250636 HC RX REV CODE- 250/636: Performed by: NURSE ANESTHETIST, CERTIFIED REGISTERED

## 2021-03-04 PROCEDURE — 80048 BASIC METABOLIC PNL TOTAL CA: CPT

## 2021-03-04 PROCEDURE — 74011250636 HC RX REV CODE- 250/636: Performed by: FAMILY MEDICINE

## 2021-03-04 PROCEDURE — 76060000038 HC ANESTHESIA 3.5 TO 4 HR: Performed by: ORTHOPAEDIC SURGERY

## 2021-03-04 PROCEDURE — 77030011264 HC ELECTRD BLD EXT COVD -A: Performed by: ORTHOPAEDIC SURGERY

## 2021-03-04 PROCEDURE — 2709999900 HC NON-CHARGEABLE SUPPLY: Performed by: ORTHOPAEDIC SURGERY

## 2021-03-04 PROCEDURE — 77030031139 HC SUT VCRL2 J&J -A: Performed by: ORTHOPAEDIC SURGERY

## 2021-03-04 PROCEDURE — 74011250636 HC RX REV CODE- 250/636: Performed by: ANESTHESIOLOGY

## 2021-03-04 PROCEDURE — 74011250637 HC RX REV CODE- 250/637: Performed by: FAMILY MEDICINE

## 2021-03-04 PROCEDURE — 74011000250 HC RX REV CODE- 250: Performed by: NURSE ANESTHETIST, CERTIFIED REGISTERED

## 2021-03-04 PROCEDURE — 74011250636 HC RX REV CODE- 250/636: Performed by: ORTHOPAEDIC SURGERY

## 2021-03-04 PROCEDURE — 97110 THERAPEUTIC EXERCISES: CPT

## 2021-03-04 PROCEDURE — 77030003827 HC BIT DRL J&J -B: Performed by: ORTHOPAEDIC SURGERY

## 2021-03-04 PROCEDURE — 77030031140 HC SUT VCRL3 J&J -A: Performed by: ORTHOPAEDIC SURGERY

## 2021-03-04 PROCEDURE — 74011250637 HC RX REV CODE- 250/637: Performed by: ORTHOPAEDIC SURGERY

## 2021-03-04 PROCEDURE — 76010000174 HC OR TIME 3.5 TO 4 HR INTENSV-TIER 1: Performed by: ORTHOPAEDIC SURGERY

## 2021-03-04 DEVICE — SCREW BNE L24MM DIA3.5MM STD CORT DST TIB TI ST LOK FULL: Type: IMPLANTABLE DEVICE | Site: HUMERUS | Status: FUNCTIONAL

## 2021-03-04 DEVICE — IMPLANTABLE DEVICE: Type: IMPLANTABLE DEVICE | Site: HUMERUS | Status: FUNCTIONAL

## 2021-03-04 DEVICE — SCR CORT LP 3.5X24MM T15 --: Type: IMPLANTABLE DEVICE | Site: HUMERUS | Status: FUNCTIONAL

## 2021-03-04 DEVICE — SCREW BNE L44MM DIA4MM STD CANC TI ST LOK FULL THRD BLNT SM: Type: IMPLANTABLE DEVICE | Site: HUMERUS | Status: FUNCTIONAL

## 2021-03-04 DEVICE — SCREW BNE L48MM DIA4MM STD CANC TI ST LOK FULL THRD BLNT SM: Type: IMPLANTABLE DEVICE | Site: HUMERUS | Status: FUNCTIONAL

## 2021-03-04 DEVICE — PLATE BNE L80MM 3 H R PROX HUM HI ALPS: Type: IMPLANTABLE DEVICE | Site: HUMERUS | Status: FUNCTIONAL

## 2021-03-04 DEVICE — SCREW BNE L22MM DIA3.5MM STD DST CORT TIB TI ST: Type: IMPLANTABLE DEVICE | Site: HUMERUS | Status: FUNCTIONAL

## 2021-03-04 DEVICE — SCREW BNE L38MM DIA4MM STD CANC TI ST LOK FULL THRD BLNT SM: Type: IMPLANTABLE DEVICE | Site: HUMERUS | Status: FUNCTIONAL

## 2021-03-04 DEVICE — SCREW BNE L36MM DIA3.5MM STD CORT DST TIB TI ST LOK FULL: Type: IMPLANTABLE DEVICE | Site: HUMERUS | Status: FUNCTIONAL

## 2021-03-04 DEVICE — K WIRE FIX L15.2CM DIA2MM S STL SMOOTH DBL SHRP TIP: Type: IMPLANTABLE DEVICE | Site: HUMERUS | Status: FUNCTIONAL

## 2021-03-04 DEVICE — SCREW BNE L34MM DIA3.5MM STD CORT DST TIB TI ST LOK FULL: Type: IMPLANTABLE DEVICE | Site: HUMERUS | Status: FUNCTIONAL

## 2021-03-04 RX ORDER — FENTANYL CITRATE 50 UG/ML
INJECTION, SOLUTION INTRAMUSCULAR; INTRAVENOUS AS NEEDED
Status: DISCONTINUED | OUTPATIENT
Start: 2021-03-04 | End: 2021-03-04 | Stop reason: HOSPADM

## 2021-03-04 RX ORDER — DIPHENHYDRAMINE HYDROCHLORIDE 50 MG/ML
12.5 INJECTION, SOLUTION INTRAMUSCULAR; INTRAVENOUS AS NEEDED
Status: DISCONTINUED | OUTPATIENT
Start: 2021-03-04 | End: 2021-03-04 | Stop reason: HOSPADM

## 2021-03-04 RX ORDER — NORETHINDRONE AND ETHINYL ESTRADIOL 0.5-0.035
5 KIT ORAL AS NEEDED
Status: DISCONTINUED | OUTPATIENT
Start: 2021-03-04 | End: 2021-03-04 | Stop reason: HOSPADM

## 2021-03-04 RX ORDER — DEXAMETHASONE SODIUM PHOSPHATE 4 MG/ML
INJECTION, SOLUTION INTRA-ARTICULAR; INTRALESIONAL; INTRAMUSCULAR; INTRAVENOUS; SOFT TISSUE AS NEEDED
Status: DISCONTINUED | OUTPATIENT
Start: 2021-03-04 | End: 2021-03-04 | Stop reason: HOSPADM

## 2021-03-04 RX ORDER — PROPOFOL 10 MG/ML
INJECTION, EMULSION INTRAVENOUS AS NEEDED
Status: DISCONTINUED | OUTPATIENT
Start: 2021-03-04 | End: 2021-03-04 | Stop reason: HOSPADM

## 2021-03-04 RX ORDER — HYDROMORPHONE HYDROCHLORIDE 2 MG/ML
INJECTION, SOLUTION INTRAMUSCULAR; INTRAVENOUS; SUBCUTANEOUS AS NEEDED
Status: DISCONTINUED | OUTPATIENT
Start: 2021-03-04 | End: 2021-03-04 | Stop reason: HOSPADM

## 2021-03-04 RX ORDER — ONDANSETRON 2 MG/ML
4 INJECTION INTRAMUSCULAR; INTRAVENOUS AS NEEDED
Status: DISCONTINUED | OUTPATIENT
Start: 2021-03-04 | End: 2021-03-04 | Stop reason: HOSPADM

## 2021-03-04 RX ORDER — HYDROCODONE BITARTRATE AND ACETAMINOPHEN 5; 325 MG/1; MG/1
1 TABLET ORAL AS NEEDED
Status: DISCONTINUED | OUTPATIENT
Start: 2021-03-04 | End: 2021-03-04 | Stop reason: HOSPADM

## 2021-03-04 RX ORDER — LIDOCAINE HYDROCHLORIDE 20 MG/ML
INJECTION, SOLUTION EPIDURAL; INFILTRATION; INTRACAUDAL; PERINEURAL AS NEEDED
Status: DISCONTINUED | OUTPATIENT
Start: 2021-03-04 | End: 2021-03-04 | Stop reason: HOSPADM

## 2021-03-04 RX ORDER — METOPROLOL TARTRATE 5 MG/5ML
2.5 INJECTION INTRAVENOUS
Status: DISCONTINUED | OUTPATIENT
Start: 2021-03-04 | End: 2021-03-04 | Stop reason: HOSPADM

## 2021-03-04 RX ORDER — GLYCOPYRROLATE 0.2 MG/ML
INJECTION INTRAMUSCULAR; INTRAVENOUS AS NEEDED
Status: DISCONTINUED | OUTPATIENT
Start: 2021-03-04 | End: 2021-03-04 | Stop reason: HOSPADM

## 2021-03-04 RX ORDER — SODIUM CHLORIDE, SODIUM LACTATE, POTASSIUM CHLORIDE, CALCIUM CHLORIDE 600; 310; 30; 20 MG/100ML; MG/100ML; MG/100ML; MG/100ML
INJECTION, SOLUTION INTRAVENOUS
Status: DISCONTINUED | OUTPATIENT
Start: 2021-03-04 | End: 2021-03-04 | Stop reason: HOSPADM

## 2021-03-04 RX ORDER — NEOSTIGMINE METHYLSULFATE 1 MG/ML
INJECTION INTRAVENOUS AS NEEDED
Status: DISCONTINUED | OUTPATIENT
Start: 2021-03-04 | End: 2021-03-04 | Stop reason: HOSPADM

## 2021-03-04 RX ORDER — SODIUM CHLORIDE 0.9 % (FLUSH) 0.9 %
5-40 SYRINGE (ML) INJECTION EVERY 8 HOURS
Status: DISCONTINUED | OUTPATIENT
Start: 2021-03-04 | End: 2021-03-04 | Stop reason: HOSPADM

## 2021-03-04 RX ORDER — ONDANSETRON 2 MG/ML
INJECTION INTRAMUSCULAR; INTRAVENOUS AS NEEDED
Status: DISCONTINUED | OUTPATIENT
Start: 2021-03-04 | End: 2021-03-04 | Stop reason: HOSPADM

## 2021-03-04 RX ORDER — FENTANYL CITRATE 50 UG/ML
50 INJECTION, SOLUTION INTRAMUSCULAR; INTRAVENOUS
Status: DISCONTINUED | OUTPATIENT
Start: 2021-03-04 | End: 2021-03-04 | Stop reason: HOSPADM

## 2021-03-04 RX ORDER — HYDROMORPHONE HYDROCHLORIDE 1 MG/ML
0.4 INJECTION, SOLUTION INTRAMUSCULAR; INTRAVENOUS; SUBCUTANEOUS
Status: DISCONTINUED | OUTPATIENT
Start: 2021-03-04 | End: 2021-03-04 | Stop reason: HOSPADM

## 2021-03-04 RX ORDER — LABETALOL HCL 20 MG/4 ML
5 SYRINGE (ML) INTRAVENOUS
Status: DISCONTINUED | OUTPATIENT
Start: 2021-03-04 | End: 2021-03-04 | Stop reason: HOSPADM

## 2021-03-04 RX ORDER — ROCURONIUM BROMIDE 10 MG/ML
INJECTION, SOLUTION INTRAVENOUS AS NEEDED
Status: DISCONTINUED | OUTPATIENT
Start: 2021-03-04 | End: 2021-03-04 | Stop reason: HOSPADM

## 2021-03-04 RX ORDER — MIDAZOLAM HYDROCHLORIDE 1 MG/ML
0.5 INJECTION, SOLUTION INTRAMUSCULAR; INTRAVENOUS
Status: DISCONTINUED | OUTPATIENT
Start: 2021-03-04 | End: 2021-03-04 | Stop reason: HOSPADM

## 2021-03-04 RX ORDER — HYDRALAZINE HYDROCHLORIDE 20 MG/ML
10 INJECTION INTRAMUSCULAR; INTRAVENOUS
Status: DISCONTINUED | OUTPATIENT
Start: 2021-03-04 | End: 2021-03-04 | Stop reason: HOSPADM

## 2021-03-04 RX ORDER — CEFAZOLIN SODIUM 1 G/3ML
INJECTION, POWDER, FOR SOLUTION INTRAMUSCULAR; INTRAVENOUS AS NEEDED
Status: DISCONTINUED | OUTPATIENT
Start: 2021-03-04 | End: 2021-03-04 | Stop reason: HOSPADM

## 2021-03-04 RX ORDER — SODIUM CHLORIDE 0.9 % (FLUSH) 0.9 %
5-40 SYRINGE (ML) INJECTION AS NEEDED
Status: DISCONTINUED | OUTPATIENT
Start: 2021-03-04 | End: 2021-03-04 | Stop reason: HOSPADM

## 2021-03-04 RX ORDER — SUCCINYLCHOLINE CHLORIDE 20 MG/ML
INJECTION INTRAMUSCULAR; INTRAVENOUS AS NEEDED
Status: DISCONTINUED | OUTPATIENT
Start: 2021-03-04 | End: 2021-03-04 | Stop reason: HOSPADM

## 2021-03-04 RX ADMIN — ONDANSETRON 4 MG: 2 INJECTION INTRAMUSCULAR; INTRAVENOUS at 19:42

## 2021-03-04 RX ADMIN — SODIUM CHLORIDE, POTASSIUM CHLORIDE, SODIUM LACTATE AND CALCIUM CHLORIDE: 600; 310; 30; 20 INJECTION, SOLUTION INTRAVENOUS at 16:30

## 2021-03-04 RX ADMIN — SIMVASTATIN 20 MG: 10 TABLET, FILM COATED ORAL at 23:02

## 2021-03-04 RX ADMIN — FUROSEMIDE 40 MG: 40 TABLET ORAL at 09:38

## 2021-03-04 RX ADMIN — HYDROMORPHONE HYDROCHLORIDE 1 MG: 2 INJECTION INTRAMUSCULAR; INTRAVENOUS; SUBCUTANEOUS at 17:45

## 2021-03-04 RX ADMIN — ROCURONIUM BROMIDE 10 MG: 10 SOLUTION INTRAVENOUS at 18:27

## 2021-03-04 RX ADMIN — PHENYLEPHRINE HYDROCHLORIDE 40 MCG/MIN: 10 INJECTION INTRAVENOUS at 17:18

## 2021-03-04 RX ADMIN — PROPOFOL 100 MG: 10 INJECTION, EMULSION INTRAVENOUS at 16:57

## 2021-03-04 RX ADMIN — ROCURONIUM BROMIDE 5 MG: 10 SOLUTION INTRAVENOUS at 16:57

## 2021-03-04 RX ADMIN — CEFAZOLIN SODIUM 2 G: 1 INJECTION, POWDER, FOR SOLUTION INTRAMUSCULAR; INTRAVENOUS at 17:27

## 2021-03-04 RX ADMIN — FENTANYL CITRATE 50 MCG: 50 INJECTION INTRAMUSCULAR; INTRAVENOUS at 20:52

## 2021-03-04 RX ADMIN — FENTANYL CITRATE 50 MCG: 50 INJECTION INTRAMUSCULAR; INTRAVENOUS at 21:01

## 2021-03-04 RX ADMIN — SODIUM CHLORIDE 125 ML/HR: 9 INJECTION, SOLUTION INTRAVENOUS at 02:31

## 2021-03-04 RX ADMIN — HYDRALAZINE HYDROCHLORIDE 10 MG: 20 INJECTION INTRAMUSCULAR; INTRAVENOUS at 21:01

## 2021-03-04 RX ADMIN — FENTANYL CITRATE 50 MCG: 50 INJECTION, SOLUTION INTRAMUSCULAR; INTRAVENOUS at 16:56

## 2021-03-04 RX ADMIN — CEFTRIAXONE SODIUM 1 G: 1 INJECTION, POWDER, FOR SOLUTION INTRAMUSCULAR; INTRAVENOUS at 23:06

## 2021-03-04 RX ADMIN — AMLODIPINE BESYLATE 10 MG: 5 TABLET ORAL at 09:35

## 2021-03-04 RX ADMIN — SODIUM CHLORIDE 75 ML/HR: 9 INJECTION, SOLUTION INTRAVENOUS at 23:16

## 2021-03-04 RX ADMIN — OXYCODONE HYDROCHLORIDE 5 MG: 5 TABLET ORAL at 23:02

## 2021-03-04 RX ADMIN — ROCURONIUM BROMIDE 35 MG: 10 SOLUTION INTRAVENOUS at 17:06

## 2021-03-04 RX ADMIN — NEOSTIGMINE METHYLSULFATE 2 MG: 1 INJECTION INTRAVENOUS at 19:48

## 2021-03-04 RX ADMIN — LIDOCAINE HYDROCHLORIDE 60 MG: 20 INJECTION, SOLUTION EPIDURAL; INFILTRATION; INTRACAUDAL; PERINEURAL at 16:57

## 2021-03-04 RX ADMIN — DEXAMETHASONE SODIUM PHOSPHATE 4 MG: 4 INJECTION, SOLUTION INTRA-ARTICULAR; INTRALESIONAL; INTRAMUSCULAR; INTRAVENOUS; SOFT TISSUE at 16:53

## 2021-03-04 RX ADMIN — FENTANYL CITRATE 50 MCG: 50 INJECTION INTRAMUSCULAR; INTRAVENOUS at 20:40

## 2021-03-04 RX ADMIN — Medication 10 ML: at 10:00

## 2021-03-04 RX ADMIN — SUCCINYLCHOLINE CHLORIDE 100 MG: 20 INJECTION, SOLUTION INTRAMUSCULAR; INTRAVENOUS at 16:58

## 2021-03-04 RX ADMIN — SODIUM CHLORIDE, POTASSIUM CHLORIDE, SODIUM LACTATE AND CALCIUM CHLORIDE: 600; 310; 30; 20 INJECTION, SOLUTION INTRAVENOUS at 19:22

## 2021-03-04 RX ADMIN — CEFAZOLIN SODIUM 1 G: 1 INJECTION, POWDER, FOR SOLUTION INTRAMUSCULAR; INTRAVENOUS at 04:10

## 2021-03-04 RX ADMIN — GLYCOPYRROLATE 0.4 MG: 0.2 INJECTION INTRAMUSCULAR; INTRAVENOUS at 19:47

## 2021-03-04 RX ADMIN — FENTANYL CITRATE 50 MCG: 50 INJECTION, SOLUTION INTRAMUSCULAR; INTRAVENOUS at 20:20

## 2021-03-04 NOTE — PROGRESS NOTES
Orders for OT evaluation received and acknowledge. Per medical chart, pt is s/p R hip hemiarthroplasty on 3/3/21, and is currently pending ORIF right proximal humerus today. OT to hold at this time. Will need new orders with appropriate WB status following surgical intervention. Thank you.

## 2021-03-04 NOTE — PROGRESS NOTES
General Daily Progress Note          Patient Name:   Melissa Crews       YOB: 1945       Age:  67 y.o. Admit Date: 3/2/2021      Subjective:       Static post surgery to the right hip  Plan for left shoulder surgery today    Patient had episode of wide-complex tachycardia EKG normal sinus rhythm cardiology was consulted  Alert awake denies any chest pain or shortness of breath nausea no vomiting           Objective:     Visit Vitals  BP (!) 140/67 (BP 1 Location: Left upper arm, BP Patient Position: At rest)   Pulse 76   Temp 97.9 °F (36.6 °C)   Resp 16   Ht 5' (1.524 m)   Wt 75.8 kg (167 lb)   SpO2 96%   BMI 32.61 kg/m²        Recent Results (from the past 24 hour(s))   MAGNESIUM    Collection Time: 03/03/21  9:26 PM   Result Value Ref Range    Magnesium 1.8 1.6 - 2.4 mg/dL   POTASSIUM    Collection Time: 03/03/21  9:26 PM   Result Value Ref Range    Potassium 4.4 3.5 - 5.1 mmol/L   CBC WITH AUTOMATED DIFF    Collection Time: 03/03/21  9:26 PM   Result Value Ref Range    WBC 16.0 (H) 3.6 - 11.0 K/uL    RBC 3.40 (L) 3.80 - 5.20 M/uL    HGB 9.7 (L) 11.5 - 16.0 g/dL    HCT 30.7 (L) 35.0 - 47.0 %    MCV 90.3 80.0 - 99.0 FL    MCH 28.5 26.0 - 34.0 PG    MCHC 31.6 30.0 - 36.5 g/dL    RDW 13.4 11.5 - 14.5 %    PLATELET 028 058 - 855 K/uL    MPV 10.4 8.9 - 12.9 FL    NRBC 0.0 0  WBC    ABSOLUTE NRBC 0.00 0.00 - 0.01 K/uL    NEUTROPHILS 91 (H) 32 - 75 %    LYMPHOCYTES 5 (L) 12 - 49 %    MONOCYTES 4 (L) 5 - 13 %    EOSINOPHILS 0 0 - 7 %    BASOPHILS 0 0 - 1 %    IMMATURE GRANULOCYTES 0 0.0 - 0.5 %    ABS. NEUTROPHILS 14.6 (H) 1.8 - 8.0 K/UL    ABS. LYMPHOCYTES 0.8 0.8 - 3.5 K/UL    ABS. MONOCYTES 0.6 0.0 - 1.0 K/UL    ABS. EOSINOPHILS 0.0 0.0 - 0.4 K/UL    ABS. BASOPHILS 0.0 0.0 - 0.1 K/UL    ABS. IMM.  GRANS. 0.0 0.00 - 0.04 K/UL    DF AUTOMATED     METABOLIC PANEL, BASIC    Collection Time: 03/04/21  5:30 AM   Result Value Ref Range    Sodium 141 136 - 145 mmol/L    Potassium 4.3 3.5 - 5.1 mmol/L    Chloride 110 (H) 97 - 108 mmol/L    CO2 24 21 - 32 mmol/L    Anion gap 7 5 - 15 mmol/L    Glucose 130 (H) 65 - 100 mg/dL    BUN 26 (H) 6 - 20 mg/dL    Creatinine 0.94 0.55 - 1.02 mg/dL    BUN/Creatinine ratio 28 (H) 12 - 20      GFR est AA >60 >60 ml/min/1.73m2    GFR est non-AA 58 (L) >60 ml/min/1.73m2    Calcium 8.1 (L) 8.5 - 10.1 mg/dL     [unfilled]      Review of Systems    Constitutional: Negative for chills and fever. HENT: Negative. Eyes: Negative. Respiratory: Negative. Cardiovascular: Negative. Gastrointestinal: Negative for abdominal pain and nausea. Skin: Negative. Neurological: Negative. Physical Exam:      Constitutional: pt is oriented to person, place, and time. HENT:   Head: Normocephalic and atraumatic. Eyes: Pupils are equal, round, and reactive to light. EOM are normal.   Cardiovascular: Normal rate, regular rhythm and normal heart sounds. Pulmonary/Chest: Breath sounds normal. No wheezes. No rales. Exhibits no tenderness. Abdominal: Soft. Bowel sounds are normal. There is no abdominal tenderness. There is no rebound and no guarding. Musculoskeletal: Left femur surgery dressing in right shoulder in sling. Neurological: pt is alert and oriented to person, place, and time. XR PELV AP ONLY   Final Result      XR FEMUR RT 2 VS   Final Result   1. Acute subcapital femoral neck fracture on the right. 2. Old distal femoral metadiaphysis fracture. 3. Right knee arthroplasty. XR CHEST PORT   Final Result   1. Underexpanded lungs with minimal left basilar atelectasis. 2. Hiatal hernia. 3. Fracture left proximal humerus which may be acute. XR HIP RT W OR WO PELV 2-3 VWS   Final Result   FINDINGS: IMPRESSION: AP pelvis and limited lateral versus frog-leg imaging of   the right hip. Right femoral neck fracture with superior migration of the long bone fragment. No dislocation.       Incomplete imaging of  mid femoral diaphyseal deformity from remote fracture. Intramedullary femoral oh has been removed since 2018. XR SHOULDER RT AP/LAT MIN 2 V   Final Result   FINDINGS: IMPRESSION: 3 images of the right shoulder. Transverse fracture of the humerus neck with almost complete displacement of the   long bone fragment medially. The humerus head maintains gross alignment with the   bony glenoid. Recent Results (from the past 24 hour(s))   MAGNESIUM    Collection Time: 03/03/21  9:26 PM   Result Value Ref Range    Magnesium 1.8 1.6 - 2.4 mg/dL   POTASSIUM    Collection Time: 03/03/21  9:26 PM   Result Value Ref Range    Potassium 4.4 3.5 - 5.1 mmol/L   CBC WITH AUTOMATED DIFF    Collection Time: 03/03/21  9:26 PM   Result Value Ref Range    WBC 16.0 (H) 3.6 - 11.0 K/uL    RBC 3.40 (L) 3.80 - 5.20 M/uL    HGB 9.7 (L) 11.5 - 16.0 g/dL    HCT 30.7 (L) 35.0 - 47.0 %    MCV 90.3 80.0 - 99.0 FL    MCH 28.5 26.0 - 34.0 PG    MCHC 31.6 30.0 - 36.5 g/dL    RDW 13.4 11.5 - 14.5 %    PLATELET 545 826 - 535 K/uL    MPV 10.4 8.9 - 12.9 FL    NRBC 0.0 0  WBC    ABSOLUTE NRBC 0.00 0.00 - 0.01 K/uL    NEUTROPHILS 91 (H) 32 - 75 %    LYMPHOCYTES 5 (L) 12 - 49 %    MONOCYTES 4 (L) 5 - 13 %    EOSINOPHILS 0 0 - 7 %    BASOPHILS 0 0 - 1 %    IMMATURE GRANULOCYTES 0 0.0 - 0.5 %    ABS. NEUTROPHILS 14.6 (H) 1.8 - 8.0 K/UL    ABS. LYMPHOCYTES 0.8 0.8 - 3.5 K/UL    ABS. MONOCYTES 0.6 0.0 - 1.0 K/UL    ABS. EOSINOPHILS 0.0 0.0 - 0.4 K/UL    ABS. BASOPHILS 0.0 0.0 - 0.1 K/UL    ABS. IMM.  GRANS. 0.0 0.00 - 0.04 K/UL    DF AUTOMATED     METABOLIC PANEL, BASIC    Collection Time: 03/04/21  5:30 AM   Result Value Ref Range    Sodium 141 136 - 145 mmol/L    Potassium 4.3 3.5 - 5.1 mmol/L    Chloride 110 (H) 97 - 108 mmol/L    CO2 24 21 - 32 mmol/L    Anion gap 7 5 - 15 mmol/L    Glucose 130 (H) 65 - 100 mg/dL    BUN 26 (H) 6 - 20 mg/dL    Creatinine 0.94 0.55 - 1.02 mg/dL    BUN/Creatinine ratio 28 (H) 12 - 20      GFR est AA >60 >60 ml/min/1.73m2 GFR est non-AA 58 (L) >60 ml/min/1.73m2    Calcium 8.1 (L) 8.5 - 10.1 mg/dL       Results     Procedure Component Value Units Date/Time    CULTURE, URINE [[id number]] Collected: 03/03/21 0600    Order Status: Completed Specimen: Urine Updated: 03/03/21 0625    CULTURE, BLOOD [[id number]] Collected: 03/03/21 0028    Order Status: Completed Specimen: Blood Updated: 03/03/21 0156    CULTURE, BLOOD, LINE [[id number]] Collected: 03/02/21 2330    Order Status: Completed Specimen: Blood Updated: 03/03/21 0032           Labs:     Recent Labs     03/03/21 2126 03/03/21 0600   WBC 16.0* 13.2*   HGB 9.7* 10.5*   HCT 30.7* 32.4*    271     Recent Labs     03/04/21 0530 03/03/21 2126 03/03/21 0600 03/02/21  2100     --  139 142   K 4.3 4.4 4.1 4.0   *  --  106 107   CO2 24  --  26 24   BUN 26*  --  27* 25*   CREA 0.94  --  0.90 0.88   *  --  132* 127*   CA 8.1*  --  9.1 8.9   MG  --  1.8  --   --      Recent Labs     03/03/21 0600 03/02/21  2100   ALT 27 28   AP 77 80   TBILI 0.3 0.3   TP 7.2 7.2   ALB 3.6 3.7   GLOB 3.6 3.5     No results for input(s): INR, PTP, APTT, INREXT in the last 72 hours. No results for input(s): FE, TIBC, PSAT, FERR in the last 72 hours. No results found for: FOL, RBCF   No results for input(s): PH, PCO2, PO2 in the last 72 hours. No results for input(s): CPK, CKNDX, TROIQ in the last 72 hours.     No lab exists for component: CPKMB  No results found for: CHOL, CHOLX, CHLST, CHOLV, HDL, HDLP, LDL, LDLC, DLDLP, TGLX, TRIGL, TRIGP, CHHD, CHHDX  No results found for: GLUCPOC  Lab Results   Component Value Date/Time    Color Yellow/Straw 03/03/2021 06:00 AM    Appearance Clear 03/03/2021 06:00 AM    Specific gravity 1.018 03/03/2021 06:00 AM    pH (UA) 6.0 03/03/2021 06:00 AM    Protein Negative 03/03/2021 06:00 AM    Glucose Negative 03/03/2021 06:00 AM    Ketone 5 (A) 03/03/2021 06:00 AM    Bilirubin Negative 03/03/2021 06:00 AM    Urobilinogen 0.1 03/03/2021 06:00 AM Nitrites Negative 03/03/2021 06:00 AM    Leukocyte Esterase Negative 03/03/2021 06:00 AM    Bacteria Negative 03/03/2021 06:00 AM    WBC 0-4 03/03/2021 06:00 AM    RBC 20-50 03/03/2021 06:00 AM         Assessment:     Right hip hemiarthroplasty  Right femur fracture  Left humerus fracture  GERD  Hyperlipidemia  History of CVA  Leukocytosis  Hypertension     Plan:     On IV Rocephin  Lasix 40 mg daily  Oxybutynin 5 mg twice a day  Protonix 40 mg daily  On Zocor 20 mg daily    For surgery of the left humerus today  Repeat the labs in the morning        Current Facility-Administered Medications:     acetaminophen (TYLENOL) tablet 650 mg, 650 mg, Oral, Q6H, Stopped at 03/04/21 0500 **OR** acetaminophen (TYLENOL) suppository 650 mg, 650 mg, Rectal, Q6H, Armida Rausch MD    0.9% sodium chloride infusion, 125 mL/hr, IntraVENous, CONTINUOUS, Arminda Torres MD, Last Rate: 125 mL/hr at 03/04/21 0231, 125 mL/hr at 03/04/21 0231    sodium chloride (NS) flush 5-40 mL, 5-40 mL, IntraVENous, Q8H, Amarilis LEDESMA MD, Stopped at 03/03/21 2200    sodium chloride (NS) flush 5-40 mL, 5-40 mL, IntraVENous, PRN, Brad Hardy, Whitney Daley MD    naloxone [facility]) injection 0.4 mg, 0.4 mg, IntraVENous, PRN, Amarilis LEDESMA MD    calcium-vitamin D 600 mg(1,500mg) -200 unit per tablet 1 Tab, 1 Tab, Oral, TID WITH MEALS, Amarilis LEDESMA MD    senna-docusate (PERICOLACE) 8.6-50 mg per tablet 1 Tab, 1 Tab, Oral, BID, Amarilis LEDESMA MD, 1 Tab at 03/03/21 2032    polyethylene glycol (MIRALAX) packet 17 g, 17 g, Oral, DAILY, Whitney Rausch MD  [facility]  [START ON 3/5/2021] bisacodyL (DULCOLAX) suppository 10 mg, 10 mg, Rectal, DAILY PRN, Whitney Rocha MD    oxyCODONE IR (ROXICODONE) tablet 5 mg, 5 mg, Oral, Q4H PRN, Amarilis LEDESMA MD, 5 mg at 03/03/21 2032    0.9% sodium chloride infusion, 75 mL/hr, IntraVENous, CONTINUOUS, Kana Kennedy MD, Stopped at 03/03/21 1609    morphine injection 2 mg, 2 mg, IntraVENous, Q4H PRN, Victoria Kennedy, MD, 2 mg at 03/03/21 0548    amLODIPine (NORVASC) tablet 10 mg, 10 mg, Oral, DAILY, Margo Kennedy MD    furosemide (LASIX) tablet 40 mg, 40 mg, Oral, DAILY, Margo Kennedy MD    multivitamin (ONE A DAY) tablet 1 Tab, 1 Tab, Oral, DAILY, Margo Kennedy MD    pantoprazole (PROTONIX) tablet 40 mg, 40 mg, Oral, ACB, Kana Kennedy MD, Stopped at 03/04/21 0730    oxybutynin (DITROPAN) tablet 5 mg, 5 mg, Oral, BID, Kana Kennedy MD, 5 mg at 03/03/21 2032    simvastatin (ZOCOR) tablet 20 mg, 20 mg, Oral, QHS, Kana Kennedy MD, 20 mg at 03/03/21 2339    polyethylene glycol (MIRALAX) packet 17 g, 17 g, Oral, DAILY PRN, Margo Kennedy MD    ondansetron (ZOFRAN ODT) tablet 4 mg, 4 mg, Oral, Q8H PRN **OR** ondansetron (ZOFRAN) injection 4 mg, 4 mg, IntraVENous, Q6H PRN, Kana Kennedy MD    enoxaparin (LOVENOX) injection 40 mg, 40 mg, SubCUTAneous, DAILY, Margo Kennedy MD    cefTRIAXone (ROCEPHIN) 1 g in sterile water (preservative free) 10 mL IV syringe, 1 g, IntraVENous, Q24H, Kana Kennedy MD, 1 g at 03/03/21 3450

## 2021-03-04 NOTE — PROGRESS NOTES
Orthopedic progress note    Date:3/4/2021       Room:[facility]  Patient Mary Anne Langston     YOB: 1945     Age:76 y.o. Subjective    39-year-old female status post right hip hemiarthroplasty. Postop day #1. She is also status post right proximal humerus fracture. Patient states pain of her right hip is extremely better than yesterday. She does feel pain medication is helping. She is complaining of moderate pain of her right shoulder region. No other complaints at this time.   Objective           Vitals Last 24 Hours:  TEMPERATURE:  Temp  Av.1 °F (36.7 °C)  Min: 97.5 °F (36.4 °C)  Max: 98.6 °F (37 °C)  RESPIRATIONS RANGE: Resp  Av.7  Min: 15  Max: 22  PULSE OXIMETRY RANGE: SpO2  Av.3 %  Min: 84 %  Max: 98 %  PULSE RANGE: Pulse  Av.8  Min: 62  Max: 87  BLOOD PRESSURE RANGE: Systolic (85XZW), WCI:996 , Min:122 , RWS:033   ; Diastolic (47QPE), CBI:01, Min:53, Max:75    Current Facility-Administered Medications   Medication Dose Route Frequency    acetaminophen (TYLENOL) tablet 650 mg  650 mg Oral Q6H    Or    acetaminophen (TYLENOL) suppository 650 mg  650 mg Rectal Q6H    0.9% sodium chloride infusion  125 mL/hr IntraVENous CONTINUOUS    sodium chloride (NS) flush 5-40 mL  5-40 mL IntraVENous Q8H    sodium chloride (NS) flush 5-40 mL  5-40 mL IntraVENous PRN    naloxone (NARCAN) injection 0.4 mg  0.4 mg IntraVENous PRN    calcium-vitamin D 600 mg(1,500mg) -200 unit per tablet 1 Tab  1 Tab Oral TID WITH MEALS    senna-docusate (PERICOLACE) 8.6-50 mg per tablet 1 Tab  1 Tab Oral BID    polyethylene glycol (MIRALAX) packet 17 g  17 g Oral DAILY    [START ON 3/5/2021] bisacodyL (DULCOLAX) suppository 10 mg  10 mg Rectal DAILY PRN    oxyCODONE IR (ROXICODONE) tablet 5 mg  5 mg Oral Q4H PRN    0.9% sodium chloride infusion  75 mL/hr IntraVENous CONTINUOUS    morphine injection 2 mg  2 mg IntraVENous Q4H PRN    amLODIPine (NORVASC) tablet 10 mg  10 mg Oral DAILY    furosemide (LASIX) tablet 40 mg  40 mg Oral DAILY    multivitamin (ONE A DAY) tablet 1 Tab  1 Tab Oral DAILY    pantoprazole (PROTONIX) tablet 40 mg  40 mg Oral ACB    oxybutynin (DITROPAN) tablet 5 mg  5 mg Oral BID    simvastatin (ZOCOR) tablet 20 mg  20 mg Oral QHS    polyethylene glycol (MIRALAX) packet 17 g  17 g Oral DAILY PRN    ondansetron (ZOFRAN ODT) tablet 4 mg  4 mg Oral Q8H PRN    Or    ondansetron (ZOFRAN) injection 4 mg  4 mg IntraVENous Q6H PRN    enoxaparin (LOVENOX) injection 40 mg  40 mg SubCUTAneous DAILY    cefTRIAXone (ROCEPHIN) 1 g in sterile water (preservative free) 10 mL IV syringe  1 g IntraVENous Q24H      Review of Systems   Constitutional: Negative for malaise/fatigue. Respiratory: Negative for cough, shortness of breath and wheezing. Cardiovascular: Negative for chest pain and palpitations. Gastrointestinal: Negative for abdominal pain, heartburn, nausea and vomiting. Neurological: Negative for headaches. Musculoskeletal: Denies any numbness/tingling of operative extremity    I/O (24Hr): Intake/Output Summary (Last 24 hours) at 3/4/2021 0835  Last data filed at 3/4/2021 0524  Gross per 24 hour   Intake 3186.67 ml   Output 1965 ml   Net 1221.67 ml     Objective  Labs/Imaging/Diagnostics    Labs:  CBC:  Recent Labs     03/03/21 2126 03/03/21  0600 03/02/21  2100   WBC 16.0* 13.2* 16.2*   RBC 3.40* 3.62* 3.80   HGB 9.7* 10.5* 10.9*   HCT 30.7* 32.4* 33.8*   MCV 90.3 89.5 88.9   RDW 13.4 13.4 13.3    271 277     CHEMISTRIES:  Recent Labs     03/04/21  0530 03/03/21 2126 03/03/21  0600 03/02/21  2100     --  139 142   K 4.3 4.4 4.1 4.0   *  --  106 107   CO2 24  --  26 24   BUN 26*  --  27* 25*   CREA 0.94  --  0.90 0.88   CA 8.1*  --  9.1 8.9   MG  --  1.8  --   --    PT/INR:No results for input(s): INR, INREXT in the last 72 hours. No lab exists for component: PROTIME  APTT:No results for input(s): APTT in the last 72 hours.   LIVER PROFILE:  Recent Labs     03/03/21  0600 03/02/21  2100   AST 18 18   ALT 27 28     Lab Results   Component Value Date/Time    ALT (SGPT) 27 03/03/2021 06:00 AM    AST (SGOT) 18 03/03/2021 06:00 AM    Alk. phosphatase 77 03/03/2021 06:00 AM    Bilirubin, total 0.3 03/03/2021 06:00 AM       Physical Exam:  Right hip: Aquacel Ag dressings clean dry and intact. Mild swelling seen in right thigh. Skin is warm and dry throughout right lower extremity. Mild tenderness palpation of right thigh. No calf pain to palpation. DP/PT pulses are palpable. EHL/DF/PF is 5 out of 5. Negative Homans. Cap refill is 2 seconds. Right lower extremity neurovascular intact. Right upper extremity: Shoulder sling and swath in place. Mild swelling seen of her right shoulder. No erythema ecchymosis seen. Sensation intact throughout right upper extremity. Good range of motion of the hands and fingers of her right upper extremity.  strength is 4 out of 5. EPL intact. Radial pulse palpable. Cap refill is 2 seconds. Right upper extremity neurovascularly intact. Assessment//Plan           Patient Active Problem List    Diagnosis Date Noted    Closed right hip fracture ([facility] [address] 03/02/2021    Hip fracture ([facility] [address] 03/02/2021     Status post right hip hemiarthroplasty. Postop day #1. Status post right proximal humerus fracture. Plan for patient to return to the OR for ORIF right proximal humerus today with Dr. Farhan Nobles. Continue with pain management as needed. Patient can take oral pain medication with sips of water today.       Electronically signed by Yelena Banks PA-C on 3/4/2021 at 8:35 AM

## 2021-03-04 NOTE — PROGRESS NOTES
Problem: Mobility Impaired (Adult and Pediatric)  Goal: *Acute Goals and Plan of Care (Insert Text)  Description: Patient will move from supine to sit and sit to supine , scoot up and down, and roll side to side in bed with min to mod assist within 7 day(s). Patient will transfer from bed to chair and chair to bed with mod assist and  the least restrictive device within 7 day(s). Patient will improve static sitting balance to minimal assistance within 1 week(s). Ambulation goals to be revised after UE SX and WB status  Outcome: Not Met   PHYSICAL THERAPY EVALUATION  Patient: Callum Levin (10 y.o. female)  Date: 3/4/2021  Primary Diagnosis: Closed right hip fracture ([address] [S72.001A]  Hip fracture ([address] [S72.009A]  Procedure(s) (LRB):  Open Reduction Internal Fixation Right Proximal Humerus (Right) Day of Surgery   Precautions: WBAT rle, NWB RUE  ASSESSMENT  Patient is a 67y.o. year old female history of hypertension GERD hyperlipidemia history of CVA came to the ER after have a fall  Patient had a ground-level fall yesterday she lost her balance affording on a small rock denies any head injury came to the ER seen by the ER physician x-rays done shows left femur fracture and left humerus fracture and patient was recommend to be admitted for further evaluation treatment orthopedic was consulted patient have pain 8 out of 10 on morphine. patient  is s/p rt hemiarthroplasty and has a FX rt humerus. Patient lives with  and indep at home. patient had a walker but let it go. Patient has a cane. patient has decreased function. she is WBAT RLE and having SX today for humerus. Patient requested to be after the UE sx But agreeable toperform bed exs for LEs. AP,QS,GS,bd/add.heel slides 15 reps each. Patient's UE was supported with pillow to keep her in neutral position. we will cont with adrianna mccray tomorrow after her sx. Patient will benefit from skilled therapy intervention to address the above noted impairments. PLAN :  Recommendations and Planned Interventions: bed mobility training, transfer training, gait training, therapeutic exercises, patient and family training/education, and therapeutic activities      Frequency/Duration: Patient will be followed by physical therapy:  5 times a week to address goals.     Recommendation for discharge: (in order for the patient to meet his/her long term goals)  Therapy 3 hours per day 5-7 days per week    This discharge recommendation:  Has been made in collaboration with the attending provider and/or case management    IF patient discharges home will need the following DME: to be determined (TBD)         SUBJECTIVE:   Patient stated Danielle Gentile will prefer to be up after the shoulder SX.    OBJECTIVE DATA SUMMARY:   HISTORY:    Past Medical History:   Diagnosis Date    Arthritis     Burning with urination     GERD (gastroesophageal reflux disease)     Hypercholesterolemia     Hypertension     Long term current use of anticoagulant therapy     plavix qd    Stroke [facility])      Past Surgical History:   Procedure Laterality Date    HX KNEE REPLACEMENT Left 2015    HX KNEE REPLACEMENT Right 2018    total    HX ORTHOPAEDIC Right 04/2019    femur fx repair oh placement       Personal factors and/or comorbidities impacting plan of care:   Home Situation  Home Environment: Private residence  # Steps to Enter: 3  One/Two Story Residence: Two story, live on 1st floor  Living Alone: No  Support Systems: Spouse/Significant Other/Partner, Family member(s)  Patient Expects to be Discharged to[de-identified] Rehabilitation facility  Current DME Used/Available at Home: None    EXAMINATION/PRESENTATION/DECISION MAKING:   Critical Behavior:  Neurologic State: Alert  Orientation Level: Oriented X4  Cognition: Appropriate decision making     Range Of Motion:  AROM: Generally decreased, functional           PROM: Generally decreased, functional           Strength:    Strength: Generally decreased, functional Tone & Sensation:                  Sensation: Intact               Functional Mobility:  Bed Mobility:  Rolling: Maximum assistance           Transfers:                             Balance:      Ambulation/Gait Training:                    Right Side Weight Bearing: As tolerated                                 Therapeutic Exercises:   PHYSICAL THERAPY TREATMENT  Therapeutic Exercises:       EXERCISE   Sets   Reps   Active Active Assist   Passive Self ROM   Comments   Ankle Pumps  15 [x] [] [] []    Quad Sets/Glut Sets  15 [x] [] [] []    Hamstring Sets   [] [] [] []    Short Arc Quads   [] [] [] []    Heel Slides  15 [] [x] [] []    Straight Leg Raises   [] [] [] []    Hip abd/add  10 [] [x] [] []    Long Arc Quads   [] [] [] []    Marching   [] [] [] []       [] [] [] []       Functional Measure:  [address] [place] [id number] AM-PAC 6 Clicks         Basic Mobility Inpatient Short Form  How much difficulty does the patient currently have. .. Unable A Lot A Little None   1. Turning over in bed (including adjusting bedclothes, sheets and blankets)? [] 1   [x] 2   [] 3   [] 4   2. Sitting down on and standing up from a chair with arms ( e.g., wheelchair, bedside commode, etc.)   [x] 1   [] 2   [] 3   [] 4   3. Moving from lying on back to sitting on the side of the bed? [x] 1   [] 2   [] 3   [] 4          How much help from another person does the patient currently need. .. Total A Lot A Little None   4. Moving to and from a bed to a chair (including a wheelchair)? [x] 1   [] 2   [] 3   [] 4   5. Need to walk in hospital room? [x] 1   [] 2   [] 3   [] 4   6. Climbing 3-5 steps with a railing? [x] 1   [] 2   [] 3   [] 4   © 2007, Trustees of [address] [place] [address], under license to [employer]. All rights reserved     Score:  Initial: 7/24 Most Recent: X (Date: 03/04/2021 )   Interpretation of Tool:  Represents activities that are increasingly more difficult (i.e. Bed mobility, Transfers, Gait).   Score 24 23 22-20 19-15 14-10 9-7 6   Modifier CH CI CJ CK CL CM CN           Physical Therapy Evaluation Charge Determination   History Examination Presentation Decision-Making   LOW Complexity : Zero comorbidities / personal factors that will impact the outcome / POC HIGH Complexity : 4+ Standardized tests and measures addressing body structure, function, activity limitation and / or participation in recreation  HIGH Complexity : Unstable and unpredictable characteristics  HIGH Complexity : FOTO score of 1- 25       Based on the above components, the patient evaluation is determined to be of the following complexity level: HIGH     Pain Ratin/10    Activity Tolerance:   Fair  Please refer to the flowsheet for vital signs taken during this treatment. After treatment patient left in no apparent distress:   Supine in bed and Call bell within reach    COMMUNICATION/EDUCATION:   The patients plan of care was discussed with: Registered nurse. Fall prevention education was provided and the patient/caregiver indicated understanding., Patient/family have participated as able in goal setting and plan of care. , and Patient/family agree to work toward stated goals and plan of care.     Thank you for this referral.  Lazarus Cedar PT   Time Calculation: 21 mins

## 2021-03-05 LAB
ALBUMIN SERPL-MCNC: 2.3 G/DL (ref 3.5–5)
ALBUMIN/GLOB SERPL: 0.7 {RATIO} (ref 1.1–2.2)
ALP SERPL-CCNC: 51 U/L (ref 45–117)
ALT SERPL-CCNC: 15 U/L (ref 12–78)
ANION GAP SERPL CALC-SCNC: 6 MMOL/L (ref 5–15)
AST SERPL W P-5'-P-CCNC: 17 U/L (ref 15–37)
BASOPHILS # BLD: 0 K/UL (ref 0–0.1)
BASOPHILS NFR BLD: 0 % (ref 0–1)
BILIRUB SERPL-MCNC: 0.2 MG/DL (ref 0.2–1)
BUN SERPL-MCNC: 22 MG/DL (ref 6–20)
BUN/CREAT SERPL: 27 (ref 12–20)
CA-I BLD-MCNC: 7.9 MG/DL (ref 8.5–10.1)
CHLORIDE SERPL-SCNC: 108 MMOL/L (ref 97–108)
CO2 SERPL-SCNC: 25 MMOL/L (ref 21–32)
CREAT SERPL-MCNC: 0.82 MG/DL (ref 0.55–1.02)
DIFFERENTIAL METHOD BLD: ABNORMAL
EOSINOPHIL # BLD: 0 K/UL (ref 0–0.4)
EOSINOPHIL NFR BLD: 0 % (ref 0–7)
ERYTHROCYTE [DISTWIDTH] IN BLOOD BY AUTOMATED COUNT: 13.4 % (ref 11.5–14.5)
GLOBULIN SER CALC-MCNC: 3.2 G/DL (ref 2–4)
GLUCOSE BLD STRIP.AUTO-MCNC: 132 MG/DL (ref 65–100)
GLUCOSE SERPL-MCNC: 118 MG/DL (ref 65–100)
HCT VFR BLD AUTO: 21 % (ref 35–47)
HGB BLD-MCNC: 6.6 G/DL (ref 11.5–16)
IMM GRANULOCYTES # BLD AUTO: 0 K/UL (ref 0–0.04)
IMM GRANULOCYTES NFR BLD AUTO: 0 % (ref 0–0.5)
LYMPHOCYTES # BLD: 2 K/UL (ref 0.8–3.5)
LYMPHOCYTES NFR BLD: 17 % (ref 12–49)
MCH RBC QN AUTO: 28.3 PG (ref 26–34)
MCHC RBC AUTO-ENTMCNC: 31.4 G/DL (ref 30–36.5)
MCV RBC AUTO: 90.1 FL (ref 80–99)
MONOCYTES # BLD: 1.6 K/UL (ref 0–1)
MONOCYTES NFR BLD: 13 % (ref 5–13)
NEUTS SEG # BLD: 8.2 K/UL (ref 1.8–8)
NEUTS SEG NFR BLD: 70 % (ref 32–75)
PERFORMED BY, TECHID: ABNORMAL
PLATELET # BLD AUTO: 193 K/UL (ref 150–400)
PMV BLD AUTO: 11 FL (ref 8.9–12.9)
POTASSIUM SERPL-SCNC: 4 MMOL/L (ref 3.5–5.1)
PROT SERPL-MCNC: 5.5 G/DL (ref 6.4–8.2)
RBC # BLD AUTO: 2.33 M/UL (ref 3.8–5.2)
SODIUM SERPL-SCNC: 139 MMOL/L (ref 136–145)
WBC # BLD AUTO: 11.8 K/UL (ref 3.6–11)

## 2021-03-05 PROCEDURE — 74011000250 HC RX REV CODE- 250: Performed by: FAMILY MEDICINE

## 2021-03-05 PROCEDURE — 85025 COMPLETE CBC W/AUTO DIFF WBC: CPT

## 2021-03-05 PROCEDURE — 97530 THERAPEUTIC ACTIVITIES: CPT

## 2021-03-05 PROCEDURE — 94760 N-INVAS EAR/PLS OXIMETRY 1: CPT

## 2021-03-05 PROCEDURE — 97165 OT EVAL LOW COMPLEX 30 MIN: CPT

## 2021-03-05 PROCEDURE — 65270000029 HC RM PRIVATE

## 2021-03-05 PROCEDURE — 74011250637 HC RX REV CODE- 250/637: Performed by: PHYSICIAN ASSISTANT

## 2021-03-05 PROCEDURE — 74011250637 HC RX REV CODE- 250/637: Performed by: FAMILY MEDICINE

## 2021-03-05 PROCEDURE — 80053 COMPREHEN METABOLIC PANEL: CPT

## 2021-03-05 PROCEDURE — 74011250636 HC RX REV CODE- 250/636: Performed by: ORTHOPAEDIC SURGERY

## 2021-03-05 PROCEDURE — 97110 THERAPEUTIC EXERCISES: CPT

## 2021-03-05 PROCEDURE — 82962 GLUCOSE BLOOD TEST: CPT

## 2021-03-05 PROCEDURE — 74011250636 HC RX REV CODE- 250/636: Performed by: FAMILY MEDICINE

## 2021-03-05 PROCEDURE — 36415 COLL VENOUS BLD VENIPUNCTURE: CPT

## 2021-03-05 PROCEDURE — 74011250637 HC RX REV CODE- 250/637: Performed by: ORTHOPAEDIC SURGERY

## 2021-03-05 RX ORDER — CELECOXIB 200 MG/1
200 CAPSULE ORAL 2 TIMES DAILY
Status: DISCONTINUED | OUTPATIENT
Start: 2021-03-05 | End: 2021-03-12 | Stop reason: HOSPADM

## 2021-03-05 RX ORDER — PHENAZOPYRIDINE HYDROCHLORIDE 95 MG/1
95 TABLET ORAL 3 TIMES DAILY
Status: COMPLETED | OUTPATIENT
Start: 2021-03-05 | End: 2021-03-07

## 2021-03-05 RX ORDER — MORPHINE SULFATE 2 MG/ML
2 INJECTION, SOLUTION INTRAMUSCULAR; INTRAVENOUS
Status: DISCONTINUED | OUTPATIENT
Start: 2021-03-05 | End: 2021-03-05

## 2021-03-05 RX ORDER — OXYCODONE HYDROCHLORIDE 10 MG/1
10 TABLET ORAL
Status: DISCONTINUED | OUTPATIENT
Start: 2021-03-05 | End: 2021-03-12 | Stop reason: HOSPADM

## 2021-03-05 RX ORDER — ACETAMINOPHEN 500 MG
1000 TABLET ORAL EVERY 6 HOURS
Status: DISCONTINUED | OUTPATIENT
Start: 2021-03-05 | End: 2021-03-12 | Stop reason: HOSPADM

## 2021-03-05 RX ORDER — OXYCODONE HYDROCHLORIDE 5 MG/1
5 TABLET ORAL
Status: DISCONTINUED | OUTPATIENT
Start: 2021-03-05 | End: 2021-03-12 | Stop reason: HOSPADM

## 2021-03-05 RX ORDER — DIPHENHYDRAMINE HCL 25 MG
25 CAPSULE ORAL
Status: DISCONTINUED | OUTPATIENT
Start: 2021-03-05 | End: 2021-03-12 | Stop reason: HOSPADM

## 2021-03-05 RX ADMIN — Medication 95 MG: at 21:27

## 2021-03-05 RX ADMIN — ACETAMINOPHEN 1000 MG: 500 TABLET, FILM COATED ORAL at 11:58

## 2021-03-05 RX ADMIN — OXYBUTYNIN CHLORIDE 5 MG: 5 TABLET ORAL at 21:27

## 2021-03-05 RX ADMIN — AMLODIPINE BESYLATE 10 MG: 5 TABLET ORAL at 09:11

## 2021-03-05 RX ADMIN — FUROSEMIDE 40 MG: 40 TABLET ORAL at 09:11

## 2021-03-05 RX ADMIN — Medication 95 MG: at 12:03

## 2021-03-05 RX ADMIN — Medication 1 TABLET: at 11:58

## 2021-03-05 RX ADMIN — SIMVASTATIN 20 MG: 10 TABLET, FILM COATED ORAL at 21:27

## 2021-03-05 RX ADMIN — Medication 95 MG: at 17:30

## 2021-03-05 RX ADMIN — ONDANSETRON 4 MG: 2 INJECTION INTRAMUSCULAR; INTRAVENOUS at 03:12

## 2021-03-05 RX ADMIN — Medication 1 TABLET: at 09:11

## 2021-03-05 RX ADMIN — ACETAMINOPHEN 1000 MG: 500 TABLET, FILM COATED ORAL at 17:30

## 2021-03-05 RX ADMIN — OXYBUTYNIN CHLORIDE 5 MG: 5 TABLET ORAL at 09:12

## 2021-03-05 RX ADMIN — MULTIVITAMIN TABLET 1 TABLET: TABLET at 09:11

## 2021-03-05 RX ADMIN — Medication 10 ML: at 21:39

## 2021-03-05 RX ADMIN — PANTOPRAZOLE SODIUM 40 MG: 40 TABLET, DELAYED RELEASE ORAL at 06:58

## 2021-03-05 RX ADMIN — ACETAMINOPHEN 650 MG: 325 TABLET, FILM COATED ORAL at 03:12

## 2021-03-05 RX ADMIN — Medication 1 TABLET: at 17:30

## 2021-03-05 RX ADMIN — DIPHENHYDRAMINE HYDROCHLORIDE 25 MG: 25 CAPSULE ORAL at 06:58

## 2021-03-05 RX ADMIN — CEFTRIAXONE SODIUM 1 G: 1 INJECTION, POWDER, FOR SOLUTION INTRAMUSCULAR; INTRAVENOUS at 21:29

## 2021-03-05 RX ADMIN — CELECOXIB 200 MG: 200 CAPSULE ORAL at 09:12

## 2021-03-05 RX ADMIN — CELECOXIB 200 MG: 200 CAPSULE ORAL at 21:27

## 2021-03-05 RX ADMIN — MORPHINE SULFATE 2 MG: 2 INJECTION, SOLUTION INTRAMUSCULAR; INTRAVENOUS at 03:12

## 2021-03-05 NOTE — OP NOTES
Name of patient: Javier Amos     MRN: [id number]    Date of surgery: 3/4/2021     Surgeon: Virginia Valencia MD    Assistant: OR scrub nurse    Pre-operative diagnosis: Comminuted proximal humerus fracture, right    Post operative diagnosis: Comminuted proximal humerus fracture, right    Procedure: Open reduction and internal fixation of right humerus fracture    Anesthesia: General    Complications: none    Blood loss: 250 cc    Specimen: None    IMPLANTS:   Biomet proximal humerus locking plate  Multiple locking, cancellous and cortical screws combination  Cancellous bone chips    Indication of the procedure: 67y.o. -year-old female patient fell down 2 days ago and had proximal humerus fracture as well as femoral neck fracture. Bipolar hip arthroplasty was done yesterday for hip fracture. She was taken to the OR today for ORIF of proximal humerus after discussion of risk and benefits and possible complications as dictated in preoperative consult note. Procedure details: Patient as well as right double extremity was identified and marked in the holding area. Patient was then brought to the operating room and positioned supine onto the operating table. General was induced by anesthesia provider. Then patient was positioned in [address] 2 g of Ancef was started go 30 minutes prior to incision. Now standard prepping and draping was done. time-out was completed and agreed by everyone in the room. Standard deltopectoral incision was made and then dissection was carried up to the soft tissues. With careful dissection, cephalic vein was identified and retracted laterally. The fascia was identified and split and dissection was carried deeper. Coracoid process was palpated and conjoined tendon was identified and retracted medially. Now bicep tendon was identified. Fracture reduction was performed and confirmed under fluoroscopic views. Now Biomet proximal humerus plate was applied.   Position of plate was confirmed. Plate was temporarily secured with K wires and then fixation was done with 1 cortical screw distally. Followed by these multiple locking and cancellous screws were applied in proximal portion of the plate. 2 more shaft screws were applied. Multiple fluoroscopic views were done to confirm position of plate, length of the screws and fixation. Excellent reduction and fixation could be achieved. Now through the comminution of the fracture, cancellous bone chips were impacted. All construct was checked under fluoroscopic views. Now Irrigation was performed. Deltopectoral approach was loosely opposed to using 0 Vicryl. Subcutaneous closure was done using 2-0 Vicryl and skin was opposed with stapler. Aquasol dressing was applied. all sharp and sponge counts were correct. patient was then transferred to recovery room in stable condition. Shoulder immobilizer was applied. I was scrubbed in and performed the entire procedure by myself.     Vijay Tyler MD

## 2021-03-05 NOTE — PROGRESS NOTES
Problem: Mobility Impaired (Adult and Pediatric)  Goal: *Acute Goals and Plan of Care (Insert Text)  Description: Patient will move from supine to sit and sit to supine , scoot up and down, and roll side to side in bed with min to mod assist within 7 day(s). Patient will transfer from bed to chair and chair to bed with mod assist and  the least restrictive device within 7 day(s). Patient will improve static sitting balance to minimal assistance within 1 week(s). Ambulation goals to be revised after UE SX and WB status  Outcome: Progressing Towards Goal   PHYSICAL THERAPY TREATMENT  Patient: Javier Amos (31 y.o. female)  Date: 3/5/2021  Diagnosis: Closed right hip fracture ([facility]) [S72.001A]  Hip fracture ([address] [S72.009A] <principal problem not specified>  Procedure(s) (LRB):  Open Reduction Internal Fixation Right Proximal Humerus (Right) 1 Day Post-Op  Precautions:  wbat rle, NWB RUE with sling  Chart, physical therapy assessment, plan of care and goals were reviewed. ASSESSMENT  Patient continues with skilled PT services and is progressing towards goals. Patient had RT shoulder Sx yesterday. She is NWB RUE and has a sling. Patient agreeable to work with PT and OT. Mod assist for bed mob, supine to sit and stand transfers to recliner with 3 steps.gait belt used. Willl need hemiwalker for the transfers. Current Level of Function Impacting Discharge (mobility/balance):patient has decreased functional level, ROM, strength and mobility endurance. Other factors to consider for discharge: patient was indep with PLF and now with current SX and decrrease function will need IRF       PLAN :  Patient continues to benefit from skilled intervention to address the above impairments. Continue treatment per established plan of care. to address goals.     Recommendation for discharge: (in order for the patient to meet his/her long term goals)  Therapy 3 hours per day 5-7 days per week    This discharge recommendation:  Has been made in collaboration with the attending provider and/or case management    IF patient discharges home will need the following DME: to be determined (TBD)       SUBJECTIVE:   Patient stated I am ok. I would like to work with you. Deena Chairez    OBJECTIVE DATA SUMMARY:   Critical Behavior:  Neurologic State: Alert  Orientation Level: Oriented X4  Cognition: Appropriate decision making     Functional Mobility Training:  Bed Mobility:                    Transfers:  Sit to Stand: Moderate assistance;Assist x2  Stand to Sit: Moderate assistance;Assist x2  Stand Pivot Transfers: Moderate assistance;Assist x2                          Balance:  Sitting: Intact; With support  Standing: Impaired;Pull to stand; With support  Standing - Static: Poor;Constant support  Standing - Dynamic : Poor;Constant support  Ambulation/Gait Training:  Distance (ft): 3 Feet (ft)  Assistive Device: Gait belt  Ambulation - Level of Assistance: Moderate assistance;Assist x2     Gait Description (WDL): Exceptions to [facility]           Base of Support: Narrowed     Speed/Isha: Slow  Step Length: Left shortened;Right shortened                     Therapeutic Exercises:   Reviewed, exs in recliner for LE  Pain Ratin/10    Activity Tolerance:   Good  Please refer to the flowsheet for vital signs taken during this treatment. After treatment patient left in no apparent distress:   Sitting in chair, Heels elevated for pressure relief, and Call bell within reach    COMMUNICATION/COLLABORATION:   The patients plan of care was discussed with: Physical therapy assistant, Occupational therapist, Occupational therapy assistant, Registered nurse, and Physician.      Oxana Tillman PT   Time Calculation: 40 mins

## 2021-03-05 NOTE — PROGRESS NOTES
General Daily Progress Note          Patient Name:   Amena Valencia       YOB: 1945       Age:  67 y.o. Admit Date: 3/2/2021      Subjective:       Patient alert awake denies any chest pain or shortness of breath nausea no vomiting    Complaining of pain in the hip and the shoulder    Static post surgery to the right hip  Open reduction and internal fixation of right humerus fracture             Objective:     Visit Vitals  BP (!) 150/76   Pulse 89   Temp 99.4 °F (37.4 °C)   Resp 20   Ht 5' (1.524 m)   Wt 75.8 kg (167 lb)   SpO2 95%   BMI 32.61 kg/m²        No results found for this or any previous visit (from the past 24 hour(s)). [unfilled]      Review of Systems    Constitutional: Negative for chills and fever. HENT: Negative. Eyes: Negative. Respiratory: Negative. Cardiovascular: Negative. Gastrointestinal: Negative for abdominal pain and nausea. Skin: Negative. Neurological: Negative. Physical Exam:      Constitutional: pt is oriented to person, place, and time. HENT:   Head: Normocephalic and atraumatic. Eyes: Pupils are equal, round, and reactive to light. EOM are normal.   Cardiovascular: Normal rate, regular rhythm and normal heart sounds. Pulmonary/Chest: Breath sounds normal. No wheezes. No rales. Exhibits no tenderness. Abdominal: Soft. Bowel sounds are normal. There is no abdominal tenderness. There is no rebound and no guarding. Musculoskeletal: Left femur surgery dressing in right shoulder in sling. Neurological: pt is alert and oriented to person, place, and time. XR SHOULDER RT AP/LAT MIN 2 V   Final Result      XR FLUOROSCOPY UNDER 60 MINUTES   Final Result      XR PELV AP ONLY   Final Result      XR FEMUR RT 2 VS   Final Result   1. Acute subcapital femoral neck fracture on the right. 2. Old distal femoral metadiaphysis fracture. 3. Right knee arthroplasty. XR CHEST PORT   Final Result   1.  Underexpanded lungs with minimal left basilar atelectasis. 2. Hiatal hernia. 3. Fracture left proximal humerus which may be acute. XR HIP RT W OR WO PELV 2-3 VWS   Final Result   FINDINGS: IMPRESSION: AP pelvis and limited lateral versus frog-leg imaging of   the right hip. Right femoral neck fracture with superior migration of the long bone fragment. No dislocation. Incomplete imaging of  mid femoral diaphyseal deformity from remote fracture. Intramedullary femoral oh has been removed since 2018. XR SHOULDER RT AP/LAT MIN 2 V   Final Result   FINDINGS: IMPRESSION: 3 images of the right shoulder. Transverse fracture of the humerus neck with almost complete displacement of the   long bone fragment medially. The humerus head maintains gross alignment with the   bony glenoid. No results found for this or any previous visit (from the past 24 hour(s)).     Results     Procedure Component Value Units Date/Time    CULTURE, URINE [[id number]] Collected: 03/03/21 0600    Order Status: Completed Specimen: Urine Updated: 03/04/21 0908     Special Requests: No Special Requests        Culture result: No Growth (<1000 cfu/mL)       CULTURE, BLOOD [[id number]] Collected: 03/03/21 0028    Order Status: Completed Specimen: Blood Updated: 03/05/21 0720     Special Requests: No Special Requests        Culture result: No growth after 23 hours       CULTURE, BLOOD, LINE [[id number]] Collected: 03/02/21 2330    Order Status: Completed Specimen: Blood Updated: 03/05/21 0720     Special Requests: No Special Requests        Culture result: No growth after 23 hours              Labs:     Recent Labs     03/03/21 2126 03/03/21  0600   WBC 16.0* 13.2*   HGB 9.7* 10.5*   HCT 30.7* 32.4*    271     Recent Labs     03/04/21  0530 03/03/21 2126 03/03/21  0600 03/02/21  2100     --  139 142   K 4.3 4.4 4.1 4.0   *  --  106 107   CO2 24  --  26 24   BUN 26*  --  27* 25*   CREA 0.94  --  0.90 0.88   *  -- 132* 127*   CA 8.1*  --  9.1 8.9   MG  --  1.8  --   --      Recent Labs     03/03/21  0600 03/02/21  2100   ALT 27 28   AP 77 80   TBILI 0.3 0.3   TP 7.2 7.2   ALB 3.6 3.7   GLOB 3.6 3.5     No results for input(s): INR, PTP, APTT, INREXT, INREXT in the last 72 hours. No results for input(s): FE, TIBC, PSAT, FERR in the last 72 hours. No results found for: FOL, RBCF   No results for input(s): PH, PCO2, PO2 in the last 72 hours. No results for input(s): CPK, CKNDX, TROIQ in the last 72 hours.     No lab exists for component: CPKMB  No results found for: CHOL, CHOLX, CHLST, CHOLV, HDL, HDLP, LDL, LDLC, DLDLP, TGLX, TRIGL, TRIGP, CHHD, CHHDX  No results found for: GLUCPOC  Lab Results   Component Value Date/Time    Color Yellow/Straw 03/03/2021 06:00 AM    Appearance Clear 03/03/2021 06:00 AM    Specific gravity 1.018 03/03/2021 06:00 AM    pH (UA) 6.0 03/03/2021 06:00 AM    Protein Negative 03/03/2021 06:00 AM    Glucose Negative 03/03/2021 06:00 AM    Ketone 5 (A) 03/03/2021 06:00 AM    Bilirubin Negative 03/03/2021 06:00 AM    Urobilinogen 0.1 03/03/2021 06:00 AM    Nitrites Negative 03/03/2021 06:00 AM    Leukocyte Esterase Negative 03/03/2021 06:00 AM    Bacteria Negative 03/03/2021 06:00 AM    WBC 0-4 03/03/2021 06:00 AM    RBC 20-50 03/03/2021 06:00 AM         Assessment:     Right hip hemiarthroplasty  Open reduction and internal fixation of right humerus fracture  Right femur fracture  Left humerus fracture  GERD  Hyperlipidemia  History of CVA  Leukocytosis  Hypertension     Plan:     On IV Rocephin  Lasix 40 mg daily  Oxybutynin 5 mg twice a day  Protonix 40 mg daily  On Zocor 20 mg daily      Pending labs yesterday and today  Repeat lab today and tomorrow  PT OT consult  Discharge planning to rehab        Current Facility-Administered Medications:     diphenhydrAMINE (BENADRYL) capsule 25 mg, 25 mg, Oral, Q6H PRN, Kana Kennedy MD, 25 mg at 03/05/21 0658    acetaminophen (TYLENOL) tablet 650 mg, 650 mg, Oral, Q6H, 650 mg at 03/05/21 5075 **OR** acetaminophen (TYLENOL) suppository 650 mg, 650 mg, Rectal, Q6H, Jesús Rausch MD    sodium chloride (NS) flush 5-40 mL, 5-40 mL, IntraVENous, Q8H, Armida Rausch MD, 10 mL at 03/04/21 1000    sodium chloride (NS) flush 5-40 mL, 5-40 mL, IntraVENous, PRN, Radha LEDESMA MD    naloxone [facility]) injection 0.4 mg, 0.4 mg, IntraVENous, PRN, Radha LEDESMA MD    calcium-vitamin D 600 mg(1,500mg) -200 unit per tablet 1 Tab, 1 Tab, Oral, TID WITH MEALS, Radha LEDESMA MD, Stopped at 03/04/21 0800    senna-docusate (PERICOLACE) 8.6-50 mg per tablet 1 Tab, 1 Tab, Oral, BID, Kae Fontenot MD, Stopped at 03/04/21 0900    polyethylene glycol (MIRALAX) packet 17 g, 17 g, Oral, DAILY, Kae Fontenot MD, Stopped at 03/04/21 0900    bisacodyL (DULCOLAX) suppository 10 mg, 10 mg, Rectal, DAILY PRN, Kae Fontenot MD    oxyCODONE IR (ROXICODONE) tablet 5 mg, 5 mg, Oral, Q4H PRN, Kae Fontenot MD, 5 mg at 03/04/21 2302    0.9% sodium chloride infusion, 75 mL/hr, IntraVENous, CONTINUOUS, Kana Kennedy MD, Last Rate: 75 mL/hr at 03/04/21 2316, 75 mL/hr at 03/04/21 2316    amLODIPine (NORVASC) tablet 10 mg, 10 mg, Oral, DAILY, Kana Kennedy MD, 10 mg at 03/04/21 0935    furosemide (LASIX) tablet 40 mg, 40 mg, Oral, DAILY, Kana Kennedy MD, 40 mg at 03/04/21 6264    multivitamin (ONE A DAY) tablet 1 Tab, 1 Tab, Oral, DAILY, Kana Kennedy MD, Stopped at 03/04/21 0900    pantoprazole (PROTONIX) tablet 40 mg, 40 mg, Oral, ACB, Kana Kennedy MD, 40 mg at 03/05/21 5991    oxybutynin (DITROPAN) tablet 5 mg, 5 mg, Oral, BID, Kana Kennedy MD, Stopped at 03/04/21 0900    simvastatin (ZOCOR) tablet 20 mg, 20 mg, Oral, QHS, Kana Kennedy MD, 20 mg at 03/04/21 2302    polyethylene glycol (MIRALAX) packet 17 g, 17 g, Oral, DAILY PRN, Kana Kennedy MD    ondansetron (ZOFRAN ODT) tablet 4 mg, 4 mg, Oral, Q8H PRN **OR** ondansetron (ZOFRAN) injection 4 mg, 4 mg, IntraVENous, Q6H PRN, Kana Kennedy MD, 4 mg at 03/05/21 1654    enoxaparin (LOVENOX) injection 40 mg, 40 mg, SubCUTAneous, DAILY, Kana Kennedy MD, Stopped at 03/04/21 0900    cefTRIAXone (ROCEPHIN) 1 g in sterile water (preservative free) 10 mL IV syringe, 1 g, IntraVENous, Q24H, Kana Kennedy MD, 1 g at 03/04/21 2306

## 2021-03-05 NOTE — PROGRESS NOTES
Orthopedic progress note    Date:3/5/2021       Room:[facility]  Patient Name:Gracia Tam     YOB: 1945     Age:76 y.o. Subjective    70-year-old female status post ORIF right proximal humerus postop day 1, status post right hip hemiarthroplasty. Postop day #2. Patient is complaining of lethargy this morning. She states she did have episodes of confusion and hallucinating last night. She is complaining of moderate pain of her right shoulder region. It has improved since last night. She does feel pain medication helps. No other complaints at this time.   Objective           Vitals Last 24 Hours:  TEMPERATURE:  Temp  Av.7 °F (37.1 °C)  Min: 97.9 °F (36.6 °C)  Max: 99.6 °F (37.6 °C)  RESPIRATIONS RANGE: Resp  Av.9  Min: 13  Max: 21  PULSE OXIMETRY RANGE: SpO2  Av.9 %  Min: 93 %  Max: 100 %  PULSE RANGE: Pulse  Av.2  Min: 81  Max: 99  BLOOD PRESSURE RANGE: Systolic (45ZSD), EUH:484 , Min:141 , OID:074   ; Diastolic (13XJZ), HOS:63, Min:62, Max:76    Current Facility-Administered Medications   Medication Dose Route Frequency    diphenhydrAMINE (BENADRYL) capsule 25 mg  25 mg Oral Q6H PRN    acetaminophen (TYLENOL) tablet 650 mg  650 mg Oral Q6H    Or    acetaminophen (TYLENOL) suppository 650 mg  650 mg Rectal Q6H    sodium chloride (NS) flush 5-40 mL  5-40 mL IntraVENous Q8H    sodium chloride (NS) flush 5-40 mL  5-40 mL IntraVENous PRN    naloxone (NARCAN) injection 0.4 mg  0.4 mg IntraVENous PRN    calcium-vitamin D 600 mg(1,500mg) -200 unit per tablet 1 Tab  1 Tab Oral TID WITH MEALS    senna-docusate (PERICOLACE) 8.6-50 mg per tablet 1 Tab  1 Tab Oral BID    polyethylene glycol (MIRALAX) packet 17 g  17 g Oral DAILY    bisacodyL (DULCOLAX) suppository 10 mg  10 mg Rectal DAILY PRN    oxyCODONE IR (ROXICODONE) tablet 5 mg  5 mg Oral Q4H PRN    0.9% sodium chloride infusion  75 mL/hr IntraVENous CONTINUOUS    amLODIPine (NORVASC) tablet 10 mg  10 mg Oral DAILY    furosemide (LASIX) tablet 40 mg  40 mg Oral DAILY    multivitamin (ONE A DAY) tablet 1 Tab  1 Tab Oral DAILY    pantoprazole (PROTONIX) tablet 40 mg  40 mg Oral ACB    oxybutynin (DITROPAN) tablet 5 mg  5 mg Oral BID    simvastatin (ZOCOR) tablet 20 mg  20 mg Oral QHS    polyethylene glycol (MIRALAX) packet 17 g  17 g Oral DAILY PRN    ondansetron (ZOFRAN ODT) tablet 4 mg  4 mg Oral Q8H PRN    Or    ondansetron (ZOFRAN) injection 4 mg  4 mg IntraVENous Q6H PRN    enoxaparin (LOVENOX) injection 40 mg  40 mg SubCUTAneous DAILY    cefTRIAXone (ROCEPHIN) 1 g in sterile water (preservative free) 10 mL IV syringe  1 g IntraVENous Q24H      Review of Systems   Constitutional: Negative for malaise/fatigue. Respiratory: Negative for cough, shortness of breath and wheezing. Cardiovascular: Negative for chest pain and palpitations. Gastrointestinal: Negative for abdominal pain, heartburn, nausea and vomiting. Neurological: Negative for headaches. Musculoskeletal: Denies any numbness/tingling of operative extremity    I/O (24Hr): Intake/Output Summary (Last 24 hours) at 3/5/2021 0829  Last data filed at 3/5/2021 0703  Gross per 24 hour   Intake 1250 ml   Output 1000 ml   Net 250 ml     Objective  Labs/Imaging/Diagnostics    Labs:  CBC:  Recent Labs     03/03/21 2126 03/03/21  0600 03/02/21  2100   WBC 16.0* 13.2* 16.2*   RBC 3.40* 3.62* 3.80   HGB 9.7* 10.5* 10.9*   HCT 30.7* 32.4* 33.8*   MCV 90.3 89.5 88.9   RDW 13.4 13.4 13.3    271 277     CHEMISTRIES:  Recent Labs     03/04/21  0530 03/03/21 2126 03/03/21  0600 03/02/21  2100     --  139 142   K 4.3 4.4 4.1 4.0   *  --  106 107   CO2 24  --  26 24   BUN 26*  --  27* 25*   CREA 0.94  --  0.90 0.88   CA 8.1*  --  9.1 8.9   MG  --  1.8  --   --    PT/INR:No results for input(s): INR, INREXT in the last 72 hours. No lab exists for component: PROTIME  APTT:No results for input(s): APTT in the last 72 hours.   LIVER PROFILE:  Recent Labs     03/03/21  0600 03/02/21  2100   AST 18 18   ALT 27 28     Lab Results   Component Value Date/Time    ALT (SGPT) 27 03/03/2021 06:00 AM    AST (SGOT) 18 03/03/2021 06:00 AM    Alk. phosphatase 77 03/03/2021 06:00 AM    Bilirubin, total 0.3 03/03/2021 06:00 AM       Physical Exam:  Right upper extremity: Aquacel dressing clean dry and intact. Mild swelling seen the right shoulder. No erythema ecchymosis seen. Sensation intact throughout right upper extremity. Shoulder immobilizer in place. Good range of motion of the wrist hand and fingers of her right hand. Without discomfort.  strength is 4 out of 5. EPL intact. Radial pulse palpable. Cap refill is 2 seconds. Right upper extremity neurovascular intact. Right lower extremity: Right hip Aquacel dressings clean dry and intact. No tenderness palpation throughout right lower extremity. No calf pain. EHL/DF/PF is 5 out of 5. Assessment//Plan           Patient Active Problem List    Diagnosis Date Noted    Closed right hip fracture ([facility] [address] 03/02/2021    Hip fracture ([facility] [address] 03/02/2021     Status post ORIF right proximal humerus postop day #1. Status post right hip hemiarthroplasty. Postop day #2. We will start therapy today. Spirometer and exercise encouraged. We will adjust pain medication accordingly. I believe patient's lethargy is anesthesia medication driven. Continue to monitor.     Electronically signed by Robin Frost PA-C on 3/5/2021 at 8:29 AM

## 2021-03-05 NOTE — PROGRESS NOTES
OCCUPATIONAL THERAPY TREATMENT  Patient: Dami Baer (65 y.o. female)  Date: 3/5/2021  Diagnosis: Closed right hip fracture (HCC) [S72.001A]  Hip fracture ([address] [S72.009A] <principal problem not specified>  Procedure(s) (LRB):  Open Reduction Internal Fixation Right Proximal Humerus (Right) 1 Day Post-Op  Precautions:    Chart, occupational therapy assessment, plan of care, and goals were reviewed. ASSESSMENT  Patient continues with skilled OT services and is progressing towards goals. Upon arrival, pt sitting up in recliner with 10/10 pain/pressure in stomach area stating it felt like she was getting a UTI. Pt requested to get back into the bed to hopefully alleviate the pain. Pt completed STS with ModA. SPT with MaxA completed from recliner to EOB. Once at EOB, pt requested to sit for a few minutes since her pain was beginning to decrease. EOB>supine and scooting to [facility] completed with MaxA x2. Pt required set up of her lunch, requiring total assist for cutting up food and container management due to pt NWB on the RUE and pt is right hand dominant. Pt completed utensil management and food self -feeding with supervision. Pt left sitting up in bed with lunch, call bell within reach and bed alarm set. Current Level of Function Impacting Discharge (ADLs): assistance required for all mobility, NWB on RUE    Other factors to consider for discharge:  Family support, DME, time since onset, severity of deficits, IND at baseline         PLAN :  Patient continues to benefit from skilled intervention to address the above impairments. Continue treatment per established plan of care. to address goals.     Recommend next OT session: self care training, functional mobility training, therapeutic exercise, balance training, therapeutic activities, endurance activities, neuromuscular re-education, patient education, and family training/education    Recommendation for discharge: (in order for the patient to meet his/her long term goals)  IRF    This discharge recommendation:  Has been made in collaboration with the attending provider and/or case management    IF patient discharges home will need the following DME: TBD       SUBJECTIVE:   Patient stated I would sit up in the chair longer, but I am having so much pain in my stomach. It feels like I'm getting a UTI.    OBJECTIVE DATA SUMMARY:   Cognitive/Behavioral Status:  Neurologic State: Alert  Orientation Level: Oriented X4  Cognition: Follows commands; Appropriate decision making       Functional Mobility and Transfers for ADLs:  Bed Mobility:    Sit to Supine: Maximum assistance;Assist x2  Scooting: Maximum assistance;Assist x2    Transfers:  Sit to Stand: Moderate assistance  Stand to Sit: Moderate assistance  Bed to Chair: Maximum assistance    ADL Intervention:  Feeding  Feeding Assistance: Set-up  Container Management: Total assistance (dependent)  Cutting Food: Total assistance (dependent)  Utensil Management: Supervision  Food to Mouth: Supervision        Pain:  2 RUE, 3/10 RLE pain, 10/10 stomach pain    Activity Tolerance:   Fair  Please refer to the flowsheet for vital signs taken during this treatment. After treatment patient left in no apparent distress:   Supine in bed, Call bell within reach, Bed / chair alarm activated and Side rails x 3    COMMUNICATION/COLLABORATION:   The patients plan of care was discussed with: Physical therapy assistant. [place] with PT for increased safety for pt and clinician.       VÍCTOR Alves  Time Calculation: 25 mins    Problem: Self Care Deficits Care Plan (Adult)  Goal: *Acute Goals and Plan of Care (Insert Text)  Description: Pt will be SBA sup <> sit in prep for EOB ADLs  Pt will be SBA grooming sitting EOB  Pt will be min A LE dressing sitting EOB/long sit  Pt will be min A sit <>  prep for toileting LRAD  Pt will be min A toileting/toilet transfer/cloth mgmt LRAD  Pt will be IND following UE HEP in prep for self care tasks  Outcome: Progressing Towards Goal

## 2021-03-05 NOTE — PROGRESS NOTES
PHYSICAL THERAPY TREATMENT  Patient: Eduard Rutledge (35 y.o. female)  Date: 3/5/2021  Diagnosis: Closed right hip fracture ([facility]) [S72.001A]  Hip fracture ([address] [S72.009A] <principal problem not specified>  Procedure(s) (LRB):  Open Reduction Internal Fixation Right Proximal Humerus (Right) 1 Day Post-Op  Precautions:    Chart, physical therapy assessment, plan of care and goals were reviewed. ASSESSMENT  Patient continues with skilled PT services and is progressing towards goals. Pt seated in recliner upon entry and requesting to transfer back to bed 2/2 stomach pain. Patient reports 10/10 stomach \"pressure\" and stating that she feels like has a UTI coming on. Performed STS with mod. SPT with Max A. Once patient returned to sitting EOB, requesting to sit for a few minutes as her pain was starting to decrease. Pt required max A x 2 for sit>sup and scooting to HOB. Patient set up with her lunch, see OT note for details. Current Level of Function Impacting Discharge (mobility/balance): assistance required for all mobility    Other factors to consider for discharge: severity of deficits, time since onset, PLOF         PLAN :  Patient continues to benefit from skilled intervention to address the above impairments. Continue treatment per established plan of care. to address goals. Recommendation for discharge: (in order for the patient to meet his/her long term goals)  Therapy 3 hours per day 5-7 days per week    This discharge recommendation:  Has been made in collaboration with the attending provider and/or case management    IF patient discharges home will need the following DME: to be determined (TBD)       SUBJECTIVE:   Patient stated I would sit up in the chair longer, but i'm having so much pain in my stomach.  It feels like i'm getting a UTI.    OBJECTIVE DATA SUMMARY:   Critical Behavior:  Neurologic State: Alert  Orientation Level: Oriented X4  Cognition: Follows commands, Appropriate decision making     Functional Mobility Training:  Bed Mobility:  Sit to Supine: Maximum assistance;Assist x2  Scooting: Maximum assistance;Assist x2    Transfers:  Sit to Stand: Moderate assistance  Stand to Sit: Moderate assistance  Bed to Chair: Maximum assistance    Balance:  Sitting: Intact; With support  Standing: Impaired;Pull to stand; With support  Standing - Static: Poor;Constant support  Standing - Dynamic : Poor;Constant support      Pain Ratin/10 RLE  3/10 RUE  10/10 abdominal \"pressure\" pain    Activity Tolerance:   Fair  Please refer to the flowsheet for vital signs taken during this treatment. After treatment patient left in no apparent distress:   Supine in bed, Call bell within reach, Bed / chair alarm activated, and Side rails x 3    COMMUNICATION/COLLABORATION:   The patients plan of care was discussed with: Occupational therapy assistant. Problem: Mobility Impaired (Adult and Pediatric)  Goal: *Acute Goals and Plan of Care (Insert Text)  Description: Patient will move from supine to sit and sit to supine , scoot up and down, and roll side to side in bed with min to mod assist within 7 day(s). Patient will transfer from bed to chair and chair to bed with mod assist and  the least restrictive device within 7 day(s). Patient will improve static sitting balance to minimal assistance within 1 week(s).   Ambulation goals to be revised after UE SX and WB status  Outcome: Progressing Towards Goal       Rachael Hall PTA   Time Calculation: 25 mins

## 2021-03-06 LAB
ALBUMIN SERPL-MCNC: 2.2 G/DL (ref 3.5–5)
ALBUMIN/GLOB SERPL: 0.6 {RATIO} (ref 1.1–2.2)
ALP SERPL-CCNC: 55 U/L (ref 45–117)
ALT SERPL-CCNC: 13 U/L (ref 12–78)
ANION GAP SERPL CALC-SCNC: 5 MMOL/L (ref 5–15)
AST SERPL W P-5'-P-CCNC: 19 U/L (ref 15–37)
BASOPHILS # BLD: 0 K/UL (ref 0–0.1)
BASOPHILS NFR BLD: 0 % (ref 0–1)
BILIRUB SERPL-MCNC: 0.2 MG/DL (ref 0.2–1)
BUN SERPL-MCNC: 26 MG/DL (ref 6–20)
BUN/CREAT SERPL: 32 (ref 12–20)
CA-I BLD-MCNC: 8.6 MG/DL (ref 8.5–10.1)
CHLORIDE SERPL-SCNC: 108 MMOL/L (ref 97–108)
CO2 SERPL-SCNC: 28 MMOL/L (ref 21–32)
CREAT SERPL-MCNC: 0.81 MG/DL (ref 0.55–1.02)
DIFFERENTIAL METHOD BLD: ABNORMAL
EOSINOPHIL # BLD: 0.2 K/UL (ref 0–0.4)
EOSINOPHIL NFR BLD: 3 % (ref 0–7)
ERYTHROCYTE [DISTWIDTH] IN BLOOD BY AUTOMATED COUNT: 13.4 % (ref 11.5–14.5)
GLOBULIN SER CALC-MCNC: 3.5 G/DL (ref 2–4)
GLUCOSE BLD STRIP.AUTO-MCNC: 110 MG/DL (ref 65–100)
GLUCOSE BLD STRIP.AUTO-MCNC: 120 MG/DL (ref 65–100)
GLUCOSE BLD STRIP.AUTO-MCNC: 127 MG/DL (ref 65–100)
GLUCOSE SERPL-MCNC: 106 MG/DL (ref 65–100)
HCT VFR BLD AUTO: 19.4 % (ref 35–47)
HGB BLD-MCNC: 6.2 G/DL (ref 11.5–16)
IMM GRANULOCYTES # BLD AUTO: 0 K/UL (ref 0–0.04)
IMM GRANULOCYTES NFR BLD AUTO: 0 % (ref 0–0.5)
LYMPHOCYTES # BLD: 2.6 K/UL (ref 0.8–3.5)
LYMPHOCYTES NFR BLD: 27 % (ref 12–49)
MAGNESIUM SERPL-MCNC: 2 MG/DL (ref 1.6–2.4)
MCH RBC QN AUTO: 28.7 PG (ref 26–34)
MCHC RBC AUTO-ENTMCNC: 32 G/DL (ref 30–36.5)
MCV RBC AUTO: 89.8 FL (ref 80–99)
MONOCYTES # BLD: 1.1 K/UL (ref 0–1)
MONOCYTES NFR BLD: 12 % (ref 5–13)
NEUTS SEG # BLD: 5.6 K/UL (ref 1.8–8)
NEUTS SEG NFR BLD: 58 % (ref 32–75)
PERFORMED BY, TECHID: ABNORMAL
PLATELET # BLD AUTO: 191 K/UL (ref 150–400)
PMV BLD AUTO: 10.8 FL (ref 8.9–12.9)
POTASSIUM SERPL-SCNC: 3.5 MMOL/L (ref 3.5–5.1)
PROT SERPL-MCNC: 5.7 G/DL (ref 6.4–8.2)
RBC # BLD AUTO: 2.16 M/UL (ref 3.8–5.2)
SODIUM SERPL-SCNC: 141 MMOL/L (ref 136–145)
WBC # BLD AUTO: 9.5 K/UL (ref 3.6–11)

## 2021-03-06 PROCEDURE — 82962 GLUCOSE BLOOD TEST: CPT

## 2021-03-06 PROCEDURE — 74011000250 HC RX REV CODE- 250: Performed by: FAMILY MEDICINE

## 2021-03-06 PROCEDURE — 77010033678 HC OXYGEN DAILY

## 2021-03-06 PROCEDURE — 74011250637 HC RX REV CODE- 250/637: Performed by: ORTHOPAEDIC SURGERY

## 2021-03-06 PROCEDURE — 30233N1 TRANSFUSION OF NONAUTOLOGOUS RED BLOOD CELLS INTO PERIPHERAL VEIN, PERCUTANEOUS APPROACH: ICD-10-PCS | Performed by: FAMILY MEDICINE

## 2021-03-06 PROCEDURE — 83735 ASSAY OF MAGNESIUM: CPT

## 2021-03-06 PROCEDURE — 85025 COMPLETE CBC W/AUTO DIFF WBC: CPT

## 2021-03-06 PROCEDURE — 80053 COMPREHEN METABOLIC PANEL: CPT

## 2021-03-06 PROCEDURE — 94760 N-INVAS EAR/PLS OXIMETRY 1: CPT

## 2021-03-06 PROCEDURE — 74011250636 HC RX REV CODE- 250/636: Performed by: FAMILY MEDICINE

## 2021-03-06 PROCEDURE — 97530 THERAPEUTIC ACTIVITIES: CPT

## 2021-03-06 PROCEDURE — 36430 TRANSFUSION BLD/BLD COMPNT: CPT

## 2021-03-06 PROCEDURE — 86850 RBC ANTIBODY SCREEN: CPT

## 2021-03-06 PROCEDURE — 74011250637 HC RX REV CODE- 250/637: Performed by: INTERNAL MEDICINE

## 2021-03-06 PROCEDURE — 74011250637 HC RX REV CODE- 250/637: Performed by: PHYSICIAN ASSISTANT

## 2021-03-06 PROCEDURE — 74011250637 HC RX REV CODE- 250/637: Performed by: FAMILY MEDICINE

## 2021-03-06 PROCEDURE — 36415 COLL VENOUS BLD VENIPUNCTURE: CPT

## 2021-03-06 PROCEDURE — 65270000029 HC RM PRIVATE

## 2021-03-06 PROCEDURE — P9016 RBC LEUKOCYTES REDUCED: HCPCS

## 2021-03-06 PROCEDURE — 74011250636 HC RX REV CODE- 250/636: Performed by: INTERNAL MEDICINE

## 2021-03-06 RX ORDER — GUAIFENESIN 100 MG/5ML
81 LIQUID (ML) ORAL DAILY
Status: DISCONTINUED | OUTPATIENT
Start: 2021-03-06 | End: 2021-03-12 | Stop reason: HOSPADM

## 2021-03-06 RX ORDER — POTASSIUM CHLORIDE 20 MEQ/1
40 TABLET, EXTENDED RELEASE ORAL
Status: COMPLETED | OUTPATIENT
Start: 2021-03-06 | End: 2021-03-06

## 2021-03-06 RX ORDER — FUROSEMIDE 10 MG/ML
20 INJECTION INTRAMUSCULAR; INTRAVENOUS ONCE
Status: COMPLETED | OUTPATIENT
Start: 2021-03-06 | End: 2021-03-06

## 2021-03-06 RX ORDER — SODIUM CHLORIDE 9 MG/ML
250 INJECTION, SOLUTION INTRAVENOUS AS NEEDED
Status: DISCONTINUED | OUTPATIENT
Start: 2021-03-06 | End: 2021-03-12 | Stop reason: HOSPADM

## 2021-03-06 RX ADMIN — POTASSIUM CHLORIDE 40 MEQ: 1500 TABLET, EXTENDED RELEASE ORAL at 19:49

## 2021-03-06 RX ADMIN — POLYETHYLENE GLYCOL 3350 17 G: 17 POWDER, FOR SOLUTION ORAL at 08:57

## 2021-03-06 RX ADMIN — ACETAMINOPHEN 1000 MG: 500 TABLET, FILM COATED ORAL at 16:00

## 2021-03-06 RX ADMIN — Medication 1 TABLET: at 16:00

## 2021-03-06 RX ADMIN — ACETAMINOPHEN 1000 MG: 500 TABLET, FILM COATED ORAL at 22:46

## 2021-03-06 RX ADMIN — FUROSEMIDE 40 MG: 40 TABLET ORAL at 08:56

## 2021-03-06 RX ADMIN — FUROSEMIDE 20 MG: 10 INJECTION, SOLUTION INTRAMUSCULAR; INTRAVENOUS at 15:54

## 2021-03-06 RX ADMIN — Medication 1 TABLET: at 12:26

## 2021-03-06 RX ADMIN — CELECOXIB 200 MG: 200 CAPSULE ORAL at 22:00

## 2021-03-06 RX ADMIN — Medication 1 TABLET: at 08:55

## 2021-03-06 RX ADMIN — SIMVASTATIN 20 MG: 10 TABLET, FILM COATED ORAL at 22:00

## 2021-03-06 RX ADMIN — ACETAMINOPHEN 1000 MG: 500 TABLET, FILM COATED ORAL at 12:26

## 2021-03-06 RX ADMIN — MULTIVITAMIN TABLET 1 TABLET: TABLET at 08:55

## 2021-03-06 RX ADMIN — ACETAMINOPHEN 1000 MG: 500 TABLET, FILM COATED ORAL at 02:07

## 2021-03-06 RX ADMIN — DOCUSATE SODIUM 50 MG AND SENNOSIDES 8.6 MG 1 TABLET: 8.6; 5 TABLET, FILM COATED ORAL at 08:55

## 2021-03-06 RX ADMIN — Medication 95 MG: at 08:55

## 2021-03-06 RX ADMIN — CELECOXIB 200 MG: 200 CAPSULE ORAL at 08:55

## 2021-03-06 RX ADMIN — ASPIRIN 81 MG: 81 TABLET, CHEWABLE ORAL at 12:26

## 2021-03-06 RX ADMIN — FUROSEMIDE 20 MG: 10 INJECTION, SOLUTION INTRAMUSCULAR; INTRAVENOUS at 23:54

## 2021-03-06 RX ADMIN — Medication 10 ML: at 14:00

## 2021-03-06 RX ADMIN — CEFTRIAXONE SODIUM 1 G: 1 INJECTION, POWDER, FOR SOLUTION INTRAMUSCULAR; INTRAVENOUS at 22:46

## 2021-03-06 RX ADMIN — OXYBUTYNIN CHLORIDE 5 MG: 5 TABLET ORAL at 08:56

## 2021-03-06 RX ADMIN — Medication 95 MG: at 22:00

## 2021-03-06 RX ADMIN — AMLODIPINE BESYLATE 10 MG: 5 TABLET ORAL at 08:56

## 2021-03-06 RX ADMIN — Medication 95 MG: at 15:54

## 2021-03-06 RX ADMIN — OXYBUTYNIN CHLORIDE 5 MG: 5 TABLET ORAL at 21:59

## 2021-03-06 RX ADMIN — PANTOPRAZOLE SODIUM 40 MG: 40 TABLET, DELAYED RELEASE ORAL at 08:55

## 2021-03-06 RX ADMIN — Medication 10 ML: at 02:23

## 2021-03-06 RX ADMIN — DOCUSATE SODIUM 50 MG AND SENNOSIDES 8.6 MG 1 TABLET: 8.6; 5 TABLET, FILM COATED ORAL at 21:59

## 2021-03-06 NOTE — ANESTHESIA POSTPROCEDURE EVALUATION
Procedure(s):  HIP HEMIARTHROPLASTY.     general    Anesthesia Post Evaluation      Multimodal analgesia: multimodal analgesia used between 6 hours prior to anesthesia start to PACU discharge  Patient location during evaluation: PACU  Patient participation: complete - patient participated  Level of consciousness: awake  Pain score: 0  Pain management: adequate  Airway patency: patent  Anesthetic complications: no  Cardiovascular status: acceptable  Respiratory status: acceptable  Hydration status: acceptable  Post anesthesia nausea and vomiting:  controlled  Final Post Anesthesia Temperature Assessment:  Normothermia (36.0-37.5 degrees C)      INITIAL Post-op Vital signs:   Vitals Value Taken Time   /65 03/03/21 1730   Temp 36.4 °C (97.5 °F) 03/03/21 1646   Pulse 65 03/03/21 1730   Resp 18 03/03/21 1730   SpO2 95 % 03/03/21 1730

## 2021-03-06 NOTE — PROGRESS NOTES
Shift change report given to Gatito Santana RN (oncoming nurse) by [facility] (offgoing nurse). Report included the following information SBAR, Kardex, MAR and Recent Results.

## 2021-03-06 NOTE — PROGRESS NOTES
Virginie Head  [id number]  3/6/2021    Subjective:  - pain 4/10  -No complaints of nausea or vomiting  -No complaints of chest pain    Patient was out of bed to chair yesterday and then done some in bed physical therapy today  No complaints of tingling or numbness      Objective:  -Shoulder immobilizer in situ  -Dressings clean dry and intact  -No abnormal swelling  -fingers/Toes movement full  -Dorsalis pedis and posterior tibial +2  -Radial pulse +2  -Sensation intact   -SCDs in situ    Vitals:  Patient Vitals for the past 24 hrs:   Temp Pulse Resp BP SpO2   03/06/21 0730 98.9 °F (37.2 °C) 70 17 (!) 122/51 99 %   03/06/21 0223     91 %   03/06/21 0044 98 °F (36.7 °C) 77 18 (!) 119/54 94 %   03/05/21 2232  77      03/05/21 2151 98.4 °F (36.9 °C)  18 135/63 98 %   03/05/21 2144     94 %   03/05/21 1448 98.3 °F (36.8 °C) 75 18 (!) 116/55 98 %   03/05/21 1200  92      03/05/21 1154 98.8 °F (37.1 °C) 79 18 123/64 98 %        Labs:  Recent Results (from the past 24 hour(s))   GLUCOSE, POC    Collection Time: 03/05/21 11:52 AM   Result Value Ref Range    Glucose (POC) 132 (H) 65 - 100 mg/dL    Performed by YARELI SIMS    CBC WITH AUTOMATED DIFF    Collection Time: 03/06/21  5:45 AM   Result Value Ref Range    WBC 9.5 3.6 - 11.0 K/uL    RBC 2.16 (L) 3.80 - 5.20 M/uL    HGB 6.2 (L) 11.5 - 16.0 g/dL    HCT 19.4 (L) 35.0 - 47.0 %    MCV 89.8 80.0 - 99.0 FL    MCH 28.7 26.0 - 34.0 PG    MCHC 32.0 30.0 - 36.5 g/dL    RDW 13.4 11.5 - 14.5 %    PLATELET 656 670 - 780 K/uL    MPV 10.8 8.9 - 12.9 FL    NEUTROPHILS 58 32 - 75 %    LYMPHOCYTES 27 12 - 49 %    MONOCYTES 12 5 - 13 %    EOSINOPHILS 3 0 - 7 %    BASOPHILS 0 0 - 1 %    IMMATURE GRANULOCYTES 0 0.0 - 0.5 %    ABS. NEUTROPHILS 5.6 1.8 - 8.0 K/UL    ABS. LYMPHOCYTES 2.6 0.8 - 3.5 K/UL    ABS. MONOCYTES 1.1 (H) 0.0 - 1.0 K/UL    ABS. EOSINOPHILS 0.2 0.0 - 0.4 K/UL    ABS. BASOPHILS 0.0 0.0 - 0.1 K/UL    ABS. IMM.  GRANS. 0.0 0.00 - 0.04 K/UL    DF AUTOMATED     METABOLIC PANEL, COMPREHENSIVE    Collection Time: 03/06/21  5:45 AM   Result Value Ref Range    Sodium 141 136 - 145 mmol/L    Potassium 3.5 3.5 - 5.1 mmol/L    Chloride 108 97 - 108 mmol/L    CO2 28 21 - 32 mmol/L    Anion gap 5 5 - 15 mmol/L    Glucose 106 (H) 65 - 100 mg/dL    BUN 26 (H) 6 - 20 mg/dL    Creatinine 0.81 0.55 - 1.02 mg/dL    BUN/Creatinine ratio 32 (H) 12 - 20      GFR est AA >60 >60 ml/min/1.73m2    GFR est non-AA >60 >60 ml/min/1.73m2    Calcium 8.6 8.5 - 10.1 mg/dL    Bilirubin, total 0.2 0.2 - 1.0 mg/dL    AST (SGOT) 19 15 - 37 U/L    ALT (SGPT) 13 12 - 78 U/L    Alk. phosphatase 55 45 - 117 U/L    Protein, total 5.7 (L) 6.4 - 8.2 g/dL    Albumin 2.2 (L) 3.5 - 5.0 g/dL    Globulin 3.5 2.0 - 4.0 g/dL    A-G Ratio 0.6 (L) 1.1 - 2.2     GLUCOSE, POC    Collection Time: 03/06/21  9:12 AM   Result Value Ref Range    Glucose (POC) 110 (H) 65 - 100 mg/dL    Performed by e-Zassi    GLUCOSE, POC    Collection Time: 03/06/21 11:12 AM   Result Value Ref Range    Glucose (POC) 127 (H) 65 - 100 mg/dL    Performed by e-Zassi           Assessment:  1. postoperative day 2 ORIF of right proximal humerus  2. postoperative day 3 right hip hemiarthroplasty  3. Acute anemia of blood loss    Plan/recommendations:  -Weightbearing as tolerated with right lower extremity  -Nonweightbearing with the right upper extremity  -Keep the shoulder immobilizer for 2 weeks  -Strengthening exercises for lower extremity   -Hemoglobin has dropped to 6.2 . Admitting physician has ordered 2 units of PRBC  -Okay to discharge from orthopedic standpoint once stable. patient will go to inpatient rehab.   -Follow-up with Dr. Farhan Nobles in 2 weeks.   Call [phone] for follow-up

## 2021-03-06 NOTE — PROGRESS NOTES
Progress Note    Patient: Franca Rae MRN: [id number]  SSN: [id number]    YOB: 1945  Age: 67 y.o. Sex: female      Admit Date: 3/2/2021    LOS: 4 days     Subjective:     67years old patient who had a closed hip fracture operative reduction internal fixation, history of GERD, hypertension, CVA    Objective:     Vitals:    03/05/21 2232 03/06/21 0044 03/06/21 0223 03/06/21 0730   BP:  (!) 119/54  (!) 122/51   Pulse: 77 77  70   Resp:  18  17   Temp:  98 °F (36.7 °C)  98.9 °F (37.2 °C)   SpO2:  94% 91% 99%   Weight:       Height:            Intake and Output:  Current Shift: 03/06 0701 - 03/06 1900  In: -   Out: 400 [Urine:400]  Last three shifts: 03/04 1901 - 03/06 0700  In: 1260 [I.V.:1260]  Out: 2600 [Urine:2300]    Physical Exam:   General:  Alert, cooperative, no distress, appears stated age. Eyes:  Conjunctivae/corneas clear. PERRL, EOMs intact. Fundi benign   Ears:  Normal TMs and external ear canals both ears. Nose: Nares normal. Septum midline. Mucosa normal. No drainage or sinus tenderness. Mouth/Throat: Lips, mucosa, and tongue normal. Teeth and gums normal.   Neck: Supple, symmetrical, trachea midline, no adenopathy, thyroid: no enlargment/tenderness/nodules, no carotid bruit and no JVD. Back:   Symmetric, no curvature. ROM normal. No CVA tenderness. Lungs:   Clear to auscultation bilaterally. Heart:  Regular rate and rhythm, S1, S2 normal, no murmur, click, rub or gallop. Abdomen:   Soft, non-tender. Bowel sounds normal. No masses,  No organomegaly. Extremities: Extremities normal, atraumatic, no cyanosis or edema. Pulses: 2+ and symmetric all extremities. Skin: Skin color, texture, turgor normal. No rashes or lesions   Lymph nodes: Cervical, supraclavicular, and axillary nodes normal.   Neurologic: CNII-XII intact. Normal strength, sensation and reflexes throughout. Lab/Data Review: All lab results for the last 24 hours reviewed.      Recent Results (from the past 24 hour(s))   GLUCOSE, POC    Collection Time: 03/05/21 11:52 AM   Result Value Ref Range    Glucose (POC) 132 (H) 65 - 100 mg/dL    Performed by YARELI SIMS    CBC WITH AUTOMATED DIFF    Collection Time: 03/06/21  5:45 AM   Result Value Ref Range    WBC 9.5 3.6 - 11.0 K/uL    RBC 2.16 (L) 3.80 - 5.20 M/uL    HGB 6.2 (L) 11.5 - 16.0 g/dL    HCT 19.4 (L) 35.0 - 47.0 %    MCV 89.8 80.0 - 99.0 FL    MCH 28.7 26.0 - 34.0 PG    MCHC 32.0 30.0 - 36.5 g/dL    RDW 13.4 11.5 - 14.5 %    PLATELET 722 155 - 760 K/uL    MPV 10.8 8.9 - 12.9 FL    NEUTROPHILS 58 32 - 75 %    LYMPHOCYTES 27 12 - 49 %    MONOCYTES 12 5 - 13 %    EOSINOPHILS 3 0 - 7 %    BASOPHILS 0 0 - 1 %    IMMATURE GRANULOCYTES 0 0.0 - 0.5 %    ABS. NEUTROPHILS 5.6 1.8 - 8.0 K/UL    ABS. LYMPHOCYTES 2.6 0.8 - 3.5 K/UL    ABS. MONOCYTES 1.1 (H) 0.0 - 1.0 K/UL    ABS. EOSINOPHILS 0.2 0.0 - 0.4 K/UL    ABS. BASOPHILS 0.0 0.0 - 0.1 K/UL    ABS. IMM. GRANS. 0.0 0.00 - 0.04 K/UL    DF AUTOMATED     METABOLIC PANEL, COMPREHENSIVE    Collection Time: 03/06/21  5:45 AM   Result Value Ref Range    Sodium 141 136 - 145 mmol/L    Potassium 3.5 3.5 - 5.1 mmol/L    Chloride 108 97 - 108 mmol/L    CO2 28 21 - 32 mmol/L    Anion gap 5 5 - 15 mmol/L    Glucose 106 (H) 65 - 100 mg/dL    BUN 26 (H) 6 - 20 mg/dL    Creatinine 0.81 0.55 - 1.02 mg/dL    BUN/Creatinine ratio 32 (H) 12 - 20      GFR est AA >60 >60 ml/min/1.73m2    GFR est non-AA >60 >60 ml/min/1.73m2    Calcium 8.6 8.5 - 10.1 mg/dL    Bilirubin, total 0.2 0.2 - 1.0 mg/dL    AST (SGOT) 19 15 - 37 U/L    ALT (SGPT) 13 12 - 78 U/L    Alk.  phosphatase 55 45 - 117 U/L    Protein, total 5.7 (L) 6.4 - 8.2 g/dL    Albumin 2.2 (L) 3.5 - 5.0 g/dL    Globulin 3.5 2.0 - 4.0 g/dL    A-G Ratio 0.6 (L) 1.1 - 2.2     GLUCOSE, POC    Collection Time: 03/06/21  9:12 AM   Result Value Ref Range    Glucose (POC) 110 (H) 65 - 100 mg/dL    Performed by Madelyn Sanford POC    Collection Time: 03/06/21 11:12 AM Result Value Ref Range    Glucose (POC) 127 (H) 65 - 100 mg/dL    Performed by Ashwini Monique          Assessment:     Active Problems:    Closed right hip fracture ([address] (3/2/2021)      Hip fracture ([address] (3/2/2021)      Acute anemia secondary to blood loss  Transfuse with packed red blood cells  Operative reduction internal fixation of the hip  Plan:     Transfused packed red blood cells repeat CBC in a.m.     Signed By: Ida Sanchez MD     March 6, 2021

## 2021-03-06 NOTE — PROGRESS NOTES
0930: Notified Dr. Mcintosh Found regarding patient's hgb level of 6.2 this morning. 1010: Received verbal order to transfuse 2 units of PRBC and give Lasix 20mg IV in between transfusions. 1200: Type&Screen hasn't been done yet. Phlebotomist was paged. 1500: Bedside and Verbal shift change report given to Matilde (oncoming nurse) by Trey Truong (offgoing nurse). Report included the following information SBAR.

## 2021-03-06 NOTE — PROGRESS NOTES
Problem: Mobility Impaired (Adult and Pediatric)  Goal: *Acute Goals and Plan of Care (Insert Text)  Description: Patient will move from supine to sit and sit to supine , scoot up and down, and roll side to side in bed with min to mod assist within 7 day(s). Patient will transfer from bed to chair and chair to bed with mod assist and  the least restrictive device within 7 day(s). Patient will improve static sitting balance to minimal assistance within 1 week(s). Ambulation goals to be revised after UE SX and WB status  Outcome: Progressing Towards Goal     PHYSICAL THERAPY TREATMENT  Patient: Ravinder Torres (40 y.o. female)  Date: 3/6/2021  Diagnosis: Closed right hip fracture (HCC) [S72.001A]  Hip fracture ([address] [S72.009A] <principal problem not specified>  Procedure(s) (LRB):  Open Reduction Internal Fixation Right Proximal Humerus (Right) 2 Days Post-Op  Precautions:    Chart, physical therapy assessment, plan of care and goals were reviewed. ASSESSMENT  Patient continues with skilled PT services and is progressing towards goals. Increased time required for all mobility. Participated with supine and seated therex. Decreased assistance required for bed mobility. Assistance of 1 t/o entire tx. Maintained EOB sitting without support while participating with seated therex. Performed 1 sit<>stand with CGA, held onto bed rail for support. Maintained static standing ~4min, CGA, participated with lateral weight shifting to encourage weight bearing, tolerated well with slight pain increase. While returning to supine position, educated pt to use LLE hooked under RLE for increased support/independence. Pt fatigued by end of tx. Current Level of Function Impacting Discharge (mobility/balance): Limited mobility. Other factors to consider for discharge: Limited by pain. PLAN :  Patient continues to benefit from skilled intervention to address the above impairments.   Continue treatment per established plan of care. to address goals. Recommendation for discharge: (in order for the patient to meet his/her long term goals)  Therapy 3 hours per day 5-7 days per week      IF patient discharges home will need the following DME: quad cane       SUBJECTIVE:   Patient stated Harpreet Stubbs left me in the chair too long yesterday, I can't do that again today.     OBJECTIVE DATA SUMMARY:   Critical Behavior:  Neurologic State: Alert  Orientation Level: Oriented X4  Cognition: Appropriate decision making  Safety/Judgement: Awareness of environment  Functional Mobility Training:  Bed Mobility:     Supine to Sit: Assist x1;Minimum assistance  Sit to Supine: Assist x1;Minimum assistance  Scooting: Assist x1;Minimum assistance     Interventions: Verbal cues  Transfers:  Sit to Stand: Contact guard assistance  Stand to Sit: Contact guard assistance                             Balance:  Sitting: Intact  Standing: Intact; With support  Standing - Static: Fair  Standing - Dynamic : Fair  Ambulation/Gait Training:                    Right Side Weight Bearing: As tolerated   Participated with lateral weight shifting to promote weight bearing of RLE. Therapeutic Exercises:   Supine, BLE:  AP x15  Heel slides x15  SLR x15  Hip abd/add x15    Seated, BLE:  LAQ x10    Reviewed HEP, pt verbalized understanding. Will require further review next tx. Pain Rating:  3/10.  (Most pain located in R shoulder, mild pain in RLE.)    Activity Tolerance:   Good      After treatment patient left in no apparent distress:   Supine in bed, Heels elevated for pressure relief, Call bell within reach, and Bed / chair alarm activated    COMMUNICATION/COLLABORATION:        Sugar Martinez   Time Calculation: 53 mins

## 2021-03-07 ENCOUNTER — APPOINTMENT (OUTPATIENT)
Dept: NON INVASIVE DIAGNOSTICS | Age: 76
DRG: 522 | End: 2021-03-07
Attending: INTERNAL MEDICINE
Payer: MEDICARE

## 2021-03-07 LAB
ABO + RH BLD: NORMAL
BASOPHILS # BLD: 0 K/UL (ref 0–0.1)
BASOPHILS NFR BLD: 0 % (ref 0–1)
BLD PROD TYP BPU: NORMAL
BLD PROD TYP BPU: NORMAL
BLOOD GROUP ANTIBODIES SERPL: NEGATIVE
BPU ID: NORMAL
BPU ID: NORMAL
CROSSMATCH RESULT,%XM: NORMAL
CROSSMATCH RESULT,%XM: NORMAL
DIFFERENTIAL METHOD BLD: ABNORMAL
ECHO AV AREA PEAK VELOCITY: 1.39 CM2
ECHO AV AREA VTI: 1.31 CM2
ECHO AV AREA/BSA PEAK VELOCITY: 0.8 CM2/M2
ECHO AV AREA/BSA VTI: 0.8 CM2/M2
ECHO AV MEAN GRADIENT: 17 MMHG
ECHO AV MEAN VELOCITY: 189 CM/S
ECHO AV PEAK GRADIENT: 35 MMHG
ECHO AV VTI: 59.8 CM
ECHO EST RA PRESSURE: 3 MMHG
ECHO LA AREA 4C: 24.19 CM2
ECHO LV E' SEPTAL VELOCITY: 5.46 CM/S
ECHO LV EDV A2C: 51.1 CM3
ECHO LV EJECTION FRACTION A2C: 58 %
ECHO LV EJECTION FRACTION BIPLANE: 58.3 % (ref 55–100)
ECHO LV ESV A2C: 17.4 CM3
ECHO LV INTERNAL DIMENSION DIASTOLIC: 3.71 CM (ref 3.9–5.3)
ECHO LV INTERNAL DIMENSION SYSTOLIC: 2.59 CM
ECHO LV IVSD: 1.33 CM (ref 0.6–0.9)
ECHO LV MASS 2D: 165.2 G (ref 67–162)
ECHO LV MASS INDEX 2D: 95.5 G/M2 (ref 43–95)
ECHO LV POSTERIOR WALL DIASTOLIC: 1.25 CM (ref 0.6–0.9)
ECHO LVOT DIAM: 1.8 CM
ECHO LVOT PEAK GRADIENT: 11 MMHG
ECHO LVOT SV: 78 CM3
ECHO LVOT VTI: 30.9 CM
ECHO LVOT VTI: 30.9 CM
ECHO MV A VELOCITY: 110 CM/S
ECHO MV AREA PHT: 3.06 CM2
ECHO MV E DECELERATION TIME (DT): 158 MS
ECHO MV E VELOCITY: 64.8 CM/S
ECHO MV E/A RATIO: 0.59
ECHO MV E/E' SEPTAL: 11.87
ECHO MV PRESSURE HALF TIME (PHT): 72 MS
ECHO PV PEAK INSTANTANEOUS GRADIENT SYSTOLIC: 4 MMHG
ECHO PV REGURGITANT MAX VELOCITY: 163 CM/S
ECHO PV REGURGITANT MAX VELOCITY: 297 CM/S
ECHO PV REGURGITANT MAX VELOCITY: 342 CM/S
ECHO PV REGURGITANT MAX VELOCITY: 98 CM/S
ECHO PVEIN A DURATION: 151 MS
ECHO PVEIN A VELOCITY: 34.6 CM/S
ECHO RA AREA 4C: 15 CM2
ECHO RIGHT VENTRICULAR SYSTOLIC PRESSURE (RVSP): 35 MMHG
ECHO RV INTERNAL DIMENSION: 3.05 CM
ECHO TV MAX VELOCITY: 285 CM/S
ECHO TV REGURGITANT PEAK GRADIENT: 32 MMHG
EOSINOPHIL # BLD: 0.4 K/UL (ref 0–0.4)
EOSINOPHIL NFR BLD: 4 % (ref 0–7)
ERYTHROCYTE [DISTWIDTH] IN BLOOD BY AUTOMATED COUNT: 13.5 % (ref 11.5–14.5)
GLUCOSE BLD STRIP.AUTO-MCNC: 103 MG/DL (ref 65–100)
GLUCOSE BLD STRIP.AUTO-MCNC: 106 MG/DL (ref 65–100)
GLUCOSE BLD STRIP.AUTO-MCNC: 116 MG/DL (ref 65–100)
HCT VFR BLD AUTO: 29.2 % (ref 35–47)
HGB BLD-MCNC: 9.5 G/DL (ref 11.5–16)
IMM GRANULOCYTES # BLD AUTO: 0 K/UL (ref 0–0.04)
IMM GRANULOCYTES NFR BLD AUTO: 0 % (ref 0–0.5)
LVOT MG: 5 MMHG
LYMPHOCYTES # BLD: 2.6 K/UL (ref 0.8–3.5)
LYMPHOCYTES NFR BLD: 27 % (ref 12–49)
MCH RBC QN AUTO: 28.8 PG (ref 26–34)
MCHC RBC AUTO-ENTMCNC: 32.5 G/DL (ref 30–36.5)
MCV RBC AUTO: 88.5 FL (ref 80–99)
MONOCYTES # BLD: 1 K/UL (ref 0–1)
MONOCYTES NFR BLD: 11 % (ref 5–13)
MV DEC SLOPE: 4340 MM/S2
MV DEC SLOPE: 4340 MM/S2
NEUTS SEG # BLD: 5.4 K/UL (ref 1.8–8)
NEUTS SEG NFR BLD: 58 % (ref 32–75)
PERFORMED BY, TECHID: ABNORMAL
PLATELET # BLD AUTO: 210 K/UL (ref 150–400)
PMV BLD AUTO: 10.9 FL (ref 8.9–12.9)
RBC # BLD AUTO: 3.3 M/UL (ref 3.8–5.2)
SPECIMEN EXP DATE BLD: NORMAL
STATUS OF UNIT,%ST: NORMAL
STATUS OF UNIT,%ST: NORMAL
TRANSFUSION STATUS PATIENT QL: NORMAL
TRANSFUSION STATUS PATIENT QL: NORMAL
UNIT DIVISION, %UDIV: 0
UNIT DIVISION, %UDIV: 0
WBC # BLD AUTO: 9.5 K/UL (ref 3.6–11)

## 2021-03-07 PROCEDURE — 93306 TTE W/DOPPLER COMPLETE: CPT

## 2021-03-07 PROCEDURE — 65270000029 HC RM PRIVATE

## 2021-03-07 PROCEDURE — 36415 COLL VENOUS BLD VENIPUNCTURE: CPT

## 2021-03-07 PROCEDURE — 74011250637 HC RX REV CODE- 250/637: Performed by: ORTHOPAEDIC SURGERY

## 2021-03-07 PROCEDURE — 85025 COMPLETE CBC W/AUTO DIFF WBC: CPT

## 2021-03-07 PROCEDURE — 74011250637 HC RX REV CODE- 250/637: Performed by: FAMILY MEDICINE

## 2021-03-07 PROCEDURE — 82962 GLUCOSE BLOOD TEST: CPT

## 2021-03-07 PROCEDURE — 97530 THERAPEUTIC ACTIVITIES: CPT

## 2021-03-07 PROCEDURE — 74011250637 HC RX REV CODE- 250/637: Performed by: PHYSICIAN ASSISTANT

## 2021-03-07 RX ADMIN — Medication 95 MG: at 09:05

## 2021-03-07 RX ADMIN — Medication 1 TABLET: at 11:45

## 2021-03-07 RX ADMIN — AMLODIPINE BESYLATE 10 MG: 5 TABLET ORAL at 09:05

## 2021-03-07 RX ADMIN — Medication 1 TABLET: at 16:28

## 2021-03-07 RX ADMIN — Medication 10 ML: at 04:00

## 2021-03-07 RX ADMIN — Medication 95 MG: at 22:00

## 2021-03-07 RX ADMIN — OXYBUTYNIN CHLORIDE 5 MG: 5 TABLET ORAL at 09:05

## 2021-03-07 RX ADMIN — OXYCODONE HYDROCHLORIDE 10 MG: 10 TABLET ORAL at 00:06

## 2021-03-07 RX ADMIN — ACETAMINOPHEN 1000 MG: 500 TABLET, FILM COATED ORAL at 05:45

## 2021-03-07 RX ADMIN — Medication 1 TABLET: at 09:05

## 2021-03-07 RX ADMIN — OXYBUTYNIN CHLORIDE 5 MG: 5 TABLET ORAL at 22:18

## 2021-03-07 RX ADMIN — FUROSEMIDE 40 MG: 40 TABLET ORAL at 09:05

## 2021-03-07 RX ADMIN — ASPIRIN 81 MG: 81 TABLET, CHEWABLE ORAL at 09:05

## 2021-03-07 RX ADMIN — SIMVASTATIN 20 MG: 10 TABLET, FILM COATED ORAL at 22:18

## 2021-03-07 RX ADMIN — ACETAMINOPHEN 1000 MG: 500 TABLET, FILM COATED ORAL at 22:18

## 2021-03-07 RX ADMIN — PANTOPRAZOLE SODIUM 40 MG: 40 TABLET, DELAYED RELEASE ORAL at 05:45

## 2021-03-07 RX ADMIN — Medication 10 ML: at 14:00

## 2021-03-07 RX ADMIN — MULTIVITAMIN TABLET 1 TABLET: TABLET at 09:05

## 2021-03-07 RX ADMIN — CELECOXIB 200 MG: 200 CAPSULE ORAL at 09:05

## 2021-03-07 RX ADMIN — CELECOXIB 200 MG: 200 CAPSULE ORAL at 22:18

## 2021-03-07 RX ADMIN — Medication 10 ML: at 22:20

## 2021-03-07 RX ADMIN — Medication 95 MG: at 17:11

## 2021-03-07 RX ADMIN — ACETAMINOPHEN 1000 MG: 500 TABLET, FILM COATED ORAL at 16:28

## 2021-03-07 RX ADMIN — DOCUSATE SODIUM 50 MG AND SENNOSIDES 8.6 MG 1 TABLET: 8.6; 5 TABLET, FILM COATED ORAL at 22:18

## 2021-03-07 NOTE — PROGRESS NOTES
Subjective:  - pain 3-4/10  -No complaints of nausea or vomiting  -No complaints of chest pain     Patient  with physical therapy and stood up yesterday  No complaints of tingling or numbness        Objective:  -Shoulder immobilizer in situ  -Dressings clean dry and intact  -No abnormal swelling  -fingers/Toes movement full  -Dorsalis pedis and posterior tibial +2  -Radial pulse +2  -Sensation intact   -SCDs in situ     Vitals:  Patient Vitals for the past 12 hrs:   Temp Pulse Resp BP SpO2   03/07/21 0921 98.1 °F (36.7 °C) 71 20 (!) 144/72 95 %   03/07/21 0247 97.4 °F (36.3 °C) 70 18 138/66 92 %   03/07/21 0155 98 °F (36.7 °C) 69 18 133/61 92 %   03/07/21 0152     94 %   03/07/21 0055 97.4 °F (36.3 °C) 74 16 (!) 151/70 94 %   03/07/21 0033 98 °F (36.7 °C) 78 18 (!) 144/68 93 %   03/07/21 0015 98 °F (36.7 °C) 76 18 (!) 142/66 93 %        Labs:  Recent Results (from the past 12 hour(s))   CBC WITH AUTOMATED DIFF    Collection Time: 03/07/21  5:35 AM   Result Value Ref Range    WBC 9.5 3.6 - 11.0 K/uL    RBC 3.30 (L) 3.80 - 5.20 M/uL    HGB 9.5 (L) 11.5 - 16.0 g/dL    HCT 29.2 (L) 35.0 - 47.0 %    MCV 88.5 80.0 - 99.0 FL    MCH 28.8 26.0 - 34.0 PG    MCHC 32.5 30.0 - 36.5 g/dL    RDW 13.5 11.5 - 14.5 %    PLATELET 210 645 - 757 K/uL    MPV 10.9 8.9 - 12.9 FL    NEUTROPHILS 58 32 - 75 %    LYMPHOCYTES 27 12 - 49 %    MONOCYTES 11 5 - 13 %    EOSINOPHILS 4 0 - 7 %    BASOPHILS 0 0 - 1 %    IMMATURE GRANULOCYTES 0 0.0 - 0.5 %    ABS. NEUTROPHILS 5.4 1.8 - 8.0 K/UL    ABS. LYMPHOCYTES 2.6 0.8 - 3.5 K/UL    ABS. MONOCYTES 1.0 0.0 - 1.0 K/UL    ABS. EOSINOPHILS 0.4 0.0 - 0.4 K/UL    ABS. BASOPHILS 0.0 0.0 - 0.1 K/UL    ABS. IMM. GRANS. 0.0 0.00 - 0.04 K/UL    DF AUTOMATED     GLUCOSE, POC    Collection Time: 03/07/21  9:32 AM   Result Value Ref Range    Glucose (POC) 116 (H) 65 - 100 mg/dL    Performed by [facility])                   Assessment:  1. postoperative day 3 ORIF of right proximal humerus  2. postoperative day 4 right hip hemiarthroplasty       Plan/recommendations:  -Weightbearing as tolerated with right lower extremity  -Nonweightbearing with the right upper extremity   -Keep the shoulder immobilizer for 2 weeks except for holding the walker  -Strengthening exercises for lower extremity   -Hemoglobin improved to 9 with 2 units yesterday  -Okay to discharge from orthopedic standpoint once stable. patient will go to inpatient rehab.   -Follow-up with Dr. Kathy hCan in 2 weeks.   Call [phone] for follow-up

## 2021-03-07 NOTE — PROGRESS NOTES
IV leaking at shift change, unable to restart, called Supervisor. He was unable to restart. Sent up an ED nurse, who got access in the left antecubital just now and blood restarted at a faster rate to attempt to finish before the unit expires.

## 2021-03-07 NOTE — PROGRESS NOTES
2020- Received call from primary nurse stating that patient needed IV access. Two unsuccessful attempts at this time. Will contact ED to start IV. Advised primary RN. Patient tolerated well.

## 2021-03-07 NOTE — ROUTINE PROCESS
Telemetry called, patient had 14beats wide complex. Dr. Cindi Baxter notified. Orders for stat magnesium level, potassium 40meq NOW, and consult to cardiology to be done.

## 2021-03-07 NOTE — PROGRESS NOTES
Problem: Mobility Impaired (Adult and Pediatric)  Goal: *Acute Goals and Plan of Care (Insert Text)  Description: Patient will move from supine to sit and sit to supine , scoot up and down, and roll side to side in bed with min to mod assist within 7 day(s). Patient will transfer from bed to chair and chair to bed with mod assist and  the least restrictive device within 7 day(s). Patient will improve static sitting balance to minimal assistance within 1 week(s). Ambulation goals to be revised after UE SX and WB status  Outcome: Progressing Towards Goal   PHYSICAL THERAPY TREATMENT  Patient: Bonnie Hollis (81 y.o. female)  Date: 3/7/2021  Diagnosis: Closed right hip fracture (HCC) [S72.001A]  Hip fracture ([address] [S72.009A] <principal problem not specified>  Procedure(s) (LRB):  Open Reduction Internal Fixation Right Proximal Humerus (Right) 3 Days Post-Op  Precautions:    Chart, physical therapy assessment, plan of care and goals were reviewed. ASSESSMENT  Patient continues with skilled PT services and is progressing towards goals. Patient agreeable to work with PT. She asked to stand at the side of the bed again (like yesterday)0 and use the recliner to hold onto for support. Bed mobility performed with Kieran and increased time 2/2 pain in RUE. She is very chatty so need to redirect her to focus on task at hand throughout session. She stood at EOB for 5 min while holding onto back of recliner and with SBA from therapist. At this point, transport present to take patient for imaging. Kieran to get patient back into bed and to scoot to comfort. She will benefit from continued skilled PT to improve her strength for transfers and for practice with gait training. Nurse present retirement through session to get O2 reading; patient at 93% on room air so removed supplemental O2 and Ok'd patient to work with PT on room air.      Current Level of Function Impacting Discharge (mobility/balance): decreased mobility, high anxiety     Other factors to consider for discharge: RUE NWB, RLE WBAT         PLAN :  Patient continues to benefit from skilled intervention to address the above impairments. Continue treatment per established plan of care. to address goals. Recommendation for discharge: (in order for the patient to meet his/her long term goals)  Therapy 3 hours per day 5-7 days per week    This discharge recommendation:  Has been made in collaboration with the attending provider and/or case management    IF patient discharges home will need the following DME: to be determined (TBD)       SUBJECTIVE:   Patient stated this R arm is such a bitch not being able to use it.     OBJECTIVE DATA SUMMARY:   Critical Behavior:  Neurologic State: Alert  Orientation Level: Oriented X4  Cognition: Appropriate decision making, Follows commands  Safety/Judgement: Awareness of environment  Functional Mobility Training:  Bed Mobility:  Rolling: Minimum assistance  Supine to Sit: Minimum assistance  Sit to Supine: Minimum assistance  Scooting: Minimum assistance        Transfers:  Sit to Stand: Contact guard assistance  Stand to Sit: Contact guard assistance  Patient wincing in pain upon initial stand but able to stand for 5 min and carry on a conversation      Balance:  Sitting: Intact; Without support  Standing: Intact; With support  Standing - Static: Good  Standing - Dynamic : Fair  Ambulation/Gait Training:    Right Side Weight Bearing: As tolerated  Will need to use SPC for gait training    Therapeutic Exercises:   Not enough time today to review   Pain Rating:  10/10 RUE     Activity Tolerance:   Fair and requires rest breaks  Please refer to the flowsheet for vital signs taken during this treatment.     After treatment patient left in no apparent distress:   Supine in bed, Call bell within reach, and transport present to take patient for imaging     COMMUNICATION/COLLABORATION:   The patients plan of care was discussed with: Physical therapist and Registered nurse.      Morteza Cain   Time Calculation: 24 mins

## 2021-03-07 NOTE — PROGRESS NOTES
Problem: Falls - Risk of  Goal: *Absence of Falls  Description: Document Santiago aSlamanca Fall Risk and appropriate interventions in the flowsheet.   Outcome: Progressing Towards Goal  Note: Fall Risk Interventions:  Mobility Interventions: Bed/chair exit alarm, OT consult for ADLs, PT Consult for mobility concerns, Patient to call before getting OOB         Medication Interventions: Bed/chair exit alarm, Patient to call before getting OOB, Teach patient to arise slowly    Elimination Interventions: Bed/chair exit alarm, Call light in reach, Patient to call for help with toileting needs    History of Falls Interventions: Bed/chair exit alarm

## 2021-03-07 NOTE — PROGRESS NOTES
Progress Note    Patient: Paddy Rodriguez MRN: [id number]  SSN: [id number]    YOB: 1945  Age: 67 y.o. Sex: female      Admit Date: 3/2/2021    LOS: 5 days     Subjective:     67years old patient who had a closed hip fracture operative reduction internal fixation, history of GERD, hypertension, CVA    Objective:     Vitals:    03/07/21 0921 03/07/21 1220 03/07/21 1311 03/07/21 1745   BP: (!) 144/72 138/66 (!) 147/81 139/67   Pulse: 71  79 81   Resp: 20  18 20   Temp: 98.1 °F (36.7 °C)  98.2 °F (36.8 °C) 97.8 °F (36.6 °C)   SpO2: 95%  91% 92%   Weight:  75.8 kg (167 lb 1.7 oz)     Height:  5' (1.524 m)          Intake and Output:  Current Shift: 03/07 0701 - 03/07 1900  In: -   Out: 1200 [Urine:1200]  Last three shifts: 03/05 1901 - 03/07 0700  In: 1120 [P.O.:500]  Out: 5300 [Urine:5300]    Physical Exam:   General:  Alert, cooperative, no distress, appears stated age. Eyes:  Conjunctivae/corneas clear. PERRL, EOMs intact. Fundi benign   Ears:  Normal TMs and external ear canals both ears. Nose: Nares normal. Septum midline. Mucosa normal. No drainage or sinus tenderness. Mouth/Throat: Lips, mucosa, and tongue normal. Teeth and gums normal.   Neck: Supple, symmetrical, trachea midline, no adenopathy, thyroid: no enlargment/tenderness/nodules, no carotid bruit and no JVD. Back:   Symmetric, no curvature. ROM normal. No CVA tenderness. Lungs:   Clear to auscultation bilaterally. Heart:  Regular rate and rhythm, S1, S2 normal, no murmur, click, rub or gallop. Abdomen:   Soft, non-tender. Bowel sounds normal. No masses,  No organomegaly. Extremities: Extremities normal, atraumatic, no cyanosis or edema. Pulses: 2+ and symmetric all extremities. Skin: Skin color, texture, turgor normal. No rashes or lesions   Lymph nodes: Cervical, supraclavicular, and axillary nodes normal.   Neurologic: CNII-XII intact. Normal strength, sensation and reflexes throughout.        Conner Husbands Review: All lab results for the last 24 hours reviewed. Recent Results (from the past 24 hour(s))   GLUCOSE, POC    Collection Time: 03/06/21 10:44 PM   Result Value Ref Range    Glucose (POC) 120 (H) 65 - 100 mg/dL    Performed by Thomas Leos    CBC WITH AUTOMATED DIFF    Collection Time: 03/07/21  5:35 AM   Result Value Ref Range    WBC 9.5 3.6 - 11.0 K/uL    RBC 3.30 (L) 3.80 - 5.20 M/uL    HGB 9.5 (L) 11.5 - 16.0 g/dL    HCT 29.2 (L) 35.0 - 47.0 %    MCV 88.5 80.0 - 99.0 FL    MCH 28.8 26.0 - 34.0 PG    MCHC 32.5 30.0 - 36.5 g/dL    RDW 13.5 11.5 - 14.5 %    PLATELET 368 867 - 629 K/uL    MPV 10.9 8.9 - 12.9 FL    NEUTROPHILS 58 32 - 75 %    LYMPHOCYTES 27 12 - 49 %    MONOCYTES 11 5 - 13 %    EOSINOPHILS 4 0 - 7 %    BASOPHILS 0 0 - 1 %    IMMATURE GRANULOCYTES 0 0.0 - 0.5 %    ABS. NEUTROPHILS 5.4 1.8 - 8.0 K/UL    ABS. LYMPHOCYTES 2.6 0.8 - 3.5 K/UL    ABS. MONOCYTES 1.0 0.0 - 1.0 K/UL    ABS. EOSINOPHILS 0.4 0.0 - 0.4 K/UL    ABS. BASOPHILS 0.0 0.0 - 0.1 K/UL    ABS. IMM.  GRANS. 0.0 0.00 - 0.04 K/UL    DF AUTOMATED     GLUCOSE, POC    Collection Time: 03/07/21  9:32 AM   Result Value Ref Range    Glucose (POC) 116 (H) 65 - 100 mg/dL    Performed by Severiano Poncho ([place])    ECHO ADULT COMPLETE    Collection Time: 03/07/21  1:11 PM   Result Value Ref Range    Aortic Valve Systolic Mean Gradient 12.63 mmHg    AoV VTI 59.80 cm    Aortic valve mean velocity 189.00 cm/s    Pulmonic Regurgitant End Max Velocity 297.00 cm/s    AoV PG 35.00 mmHg    Aortic Valve Area by Continuity of VTI 1.31 cm2    Aortic Valve Area by Continuity of Peak Velocity 1.39 cm2    IVSd 1.33 (A) 0.6 - 0.9 cm    LVIDd 3.71 (A) 3.9 - 5.3 cm    LVIDs 2.59 cm    LVOT d 1.80 cm    Left Ventricular Outflow Tract Mean Gradient 5.00 mmHg    LVOT VTI 30.90 cm    LVOT VTI 30.90 cm    Pulmonic Regurgitant End Max Velocity 163.00 cm/s    LVOT Peak Gradient 11.00 mmHg    LVPWd 1.25 (A) 0.6 - 0.9 cm    LV E' Septal Velocity 5.46 cm/s    LV ED Vol A2C 51.10 cm3    BP EF 58.3 55 - 100 %    LV ES Vol A2C 17.40 cm3    E/E' septal 11.87     LV Ejection Fraction MOD 2C 58.0 %    LVOT SV 78.0 cm3    Pulmonic Regurgitant End Max Velocity 342.00 cm/s    Mitral Valve Deceleration [place] 4,340.00 mm/s2    Mitral Valve Deceleration [place] 4,340.00 mm/s2    Mitral Valve E Wave Deceleration Time 158.00 ms    Mitral Valve Pressure Half-time 72.00 ms    MV A [place] 110.00 cm/s    MV E [place] 64.80 cm/s    MVA (PHT) 3.06 cm2    MV E/A 0.59     Pulmonic Regurgitant End Max Velocity 98.00 cm/s    Pulmonic Valve Systolic Peak Instantaneous Gradient 4.00 mmHg    P Vein A Dur 151.00 ms    Pulmonary Vein \"A\" Wave Velocity 34.60 cm/s    Est. RA Pressure 3.00 mmHg    RVIDd 3.05 cm    RVSP 35.00 mmHg    Tricuspid Valve Max Velocity 285.00 cm/s    Triscuspid Valve Regurgitation Peak Gradient 32.00 mmHg    Right Atrial Area 4C 15.00 cm2    LA Area 4C 24.19 cm2    LV Mass .2 67 - 162 g    LV Mass AL Index 95.5 43 - 95 g/m2    LUCINA/BSA Pk [place] 0.8 cm2/m2    LUCINA/BSA VTI 0.8 cm2/m2   GLUCOSE, POC    Collection Time: 03/07/21  1:14 PM   Result Value Ref Range    Glucose (POC) 103 (H) 65 - 100 mg/dL    Performed by Nataliya Bentley ([place])    GLUCOSE, POC    Collection Time: 03/07/21  5:44 PM   Result Value Ref Range    Glucose (POC) 106 (H) 65 - 100 mg/dL    Performed by Nataliya Bentley [facility] [place])          Assessment:     Active Problems:    Closed right hip fracture ([address] (3/2/2021)      Hip fracture ([address] (3/2/2021)      Acute anemia secondary to blood loss  Transfuse with packed red blood cells  Operative reduction internal fixation of the hip  Plan:      For placement for PT and OT    Signed By: Charity Joshi MD     March 7, 2021

## 2021-03-08 LAB
BASOPHILS # BLD: 0 K/UL (ref 0–0.1)
BASOPHILS NFR BLD: 0 % (ref 0–1)
DIFFERENTIAL METHOD BLD: ABNORMAL
EOSINOPHIL # BLD: 0.6 K/UL (ref 0–0.4)
EOSINOPHIL NFR BLD: 6 % (ref 0–7)
ERYTHROCYTE [DISTWIDTH] IN BLOOD BY AUTOMATED COUNT: 13.2 % (ref 11.5–14.5)
HCT VFR BLD AUTO: 31.5 % (ref 35–47)
HGB BLD-MCNC: 10.3 G/DL (ref 11.5–16)
IMM GRANULOCYTES # BLD AUTO: 0.1 K/UL (ref 0–0.04)
IMM GRANULOCYTES NFR BLD AUTO: 1 % (ref 0–0.5)
LYMPHOCYTES # BLD: 2.8 K/UL (ref 0.8–3.5)
LYMPHOCYTES NFR BLD: 29 % (ref 12–49)
MCH RBC QN AUTO: 28.9 PG (ref 26–34)
MCHC RBC AUTO-ENTMCNC: 32.7 G/DL (ref 30–36.5)
MCV RBC AUTO: 88.2 FL (ref 80–99)
MONOCYTES # BLD: 1 K/UL (ref 0–1)
MONOCYTES NFR BLD: 11 % (ref 5–13)
NEUTS SEG # BLD: 5.2 K/UL (ref 1.8–8)
NEUTS SEG NFR BLD: 53 % (ref 32–75)
PLATELET # BLD AUTO: 291 K/UL (ref 150–400)
PMV BLD AUTO: 10.6 FL (ref 8.9–12.9)
RBC # BLD AUTO: 3.57 M/UL (ref 3.8–5.2)
WBC # BLD AUTO: 9.5 K/UL (ref 3.6–11)

## 2021-03-08 PROCEDURE — U0003 INFECTIOUS AGENT DETECTION BY NUCLEIC ACID (DNA OR RNA); SEVERE ACUTE RESPIRATORY SYNDROME CORONAVIRUS 2 (SARS-COV-2) (CORONAVIRUS DISEASE [COVID-19]), AMPLIFIED PROBE TECHNIQUE, MAKING USE OF HIGH THROUGHPUT TECHNOLOGIES AS DESCRIBED BY CMS-2020-01-R: HCPCS

## 2021-03-08 PROCEDURE — 74011250637 HC RX REV CODE- 250/637: Performed by: ORTHOPAEDIC SURGERY

## 2021-03-08 PROCEDURE — 97530 THERAPEUTIC ACTIVITIES: CPT

## 2021-03-08 PROCEDURE — 85025 COMPLETE CBC W/AUTO DIFF WBC: CPT

## 2021-03-08 PROCEDURE — 65270000029 HC RM PRIVATE

## 2021-03-08 PROCEDURE — 97110 THERAPEUTIC EXERCISES: CPT

## 2021-03-08 PROCEDURE — 36415 COLL VENOUS BLD VENIPUNCTURE: CPT

## 2021-03-08 PROCEDURE — 74011250637 HC RX REV CODE- 250/637: Performed by: FAMILY MEDICINE

## 2021-03-08 PROCEDURE — 97116 GAIT TRAINING THERAPY: CPT

## 2021-03-08 PROCEDURE — 74011250637 HC RX REV CODE- 250/637: Performed by: PHYSICIAN ASSISTANT

## 2021-03-08 RX ORDER — CELECOXIB 200 MG/1
200 CAPSULE ORAL 2 TIMES DAILY
Qty: 60 CAP | Refills: 2 | Status: SHIPPED | OUTPATIENT
Start: 2021-03-08 | End: 2021-06-06

## 2021-03-08 RX ORDER — GUAIFENESIN 100 MG/5ML
81 LIQUID (ML) ORAL DAILY
Qty: 30 TAB | Refills: 0 | Status: SHIPPED | OUTPATIENT
Start: 2021-03-08

## 2021-03-08 RX ORDER — OXYCODONE HYDROCHLORIDE 5 MG/1
5 TABLET ORAL
Qty: 10 TAB | Refills: 0 | Status: SHIPPED | OUTPATIENT
Start: 2021-03-08 | End: 2021-03-11

## 2021-03-08 RX ADMIN — POLYETHYLENE GLYCOL 3350 17 G: 17 POWDER, FOR SOLUTION ORAL at 09:34

## 2021-03-08 RX ADMIN — Medication 10 ML: at 14:00

## 2021-03-08 RX ADMIN — ACETAMINOPHEN 1000 MG: 500 TABLET, FILM COATED ORAL at 12:36

## 2021-03-08 RX ADMIN — SIMVASTATIN 20 MG: 10 TABLET, FILM COATED ORAL at 22:11

## 2021-03-08 RX ADMIN — Medication 1 TABLET: at 12:35

## 2021-03-08 RX ADMIN — Medication 1 TABLET: at 16:57

## 2021-03-08 RX ADMIN — ASPIRIN 81 MG: 81 TABLET, CHEWABLE ORAL at 09:34

## 2021-03-08 RX ADMIN — OXYBUTYNIN CHLORIDE 5 MG: 5 TABLET ORAL at 09:34

## 2021-03-08 RX ADMIN — Medication 1 TABLET: at 09:34

## 2021-03-08 RX ADMIN — CELECOXIB 200 MG: 200 CAPSULE ORAL at 09:34

## 2021-03-08 RX ADMIN — ACETAMINOPHEN 1000 MG: 500 TABLET, FILM COATED ORAL at 16:57

## 2021-03-08 RX ADMIN — Medication 10 ML: at 22:20

## 2021-03-08 RX ADMIN — PANTOPRAZOLE SODIUM 40 MG: 40 TABLET, DELAYED RELEASE ORAL at 05:53

## 2021-03-08 RX ADMIN — ACETAMINOPHEN 1000 MG: 500 TABLET, FILM COATED ORAL at 22:19

## 2021-03-08 RX ADMIN — CELECOXIB 200 MG: 200 CAPSULE ORAL at 22:10

## 2021-03-08 RX ADMIN — OXYBUTYNIN CHLORIDE 5 MG: 5 TABLET ORAL at 22:10

## 2021-03-08 RX ADMIN — ACETAMINOPHEN 1000 MG: 500 TABLET, FILM COATED ORAL at 05:52

## 2021-03-08 RX ADMIN — DOCUSATE SODIUM 50 MG AND SENNOSIDES 8.6 MG 1 TABLET: 8.6; 5 TABLET, FILM COATED ORAL at 09:34

## 2021-03-08 RX ADMIN — DOCUSATE SODIUM 50 MG AND SENNOSIDES 8.6 MG 1 TABLET: 8.6; 5 TABLET, FILM COATED ORAL at 22:10

## 2021-03-08 RX ADMIN — AMLODIPINE BESYLATE 10 MG: 5 TABLET ORAL at 09:34

## 2021-03-08 RX ADMIN — FUROSEMIDE 40 MG: 40 TABLET ORAL at 09:34

## 2021-03-08 RX ADMIN — MULTIVITAMIN TABLET 1 TABLET: TABLET at 09:34

## 2021-03-08 NOTE — PROGRESS NOTES
Problem: Falls - Risk of  Goal: *Absence of Falls  Description: Document Prasanth Izaguirre Fall Risk and appropriate interventions in the flowsheet.   Outcome: Progressing Towards Goal  Note: Fall Risk Interventions:  Mobility Interventions: Bed/chair exit alarm, OT consult for ADLs, PT Consult for mobility concerns, Patient to call before getting OOB, PT Consult for assist device competence, Utilize walker, cane, or other assistive device         Medication Interventions: Bed/chair exit alarm, Patient to call before getting OOB, Teach patient to arise slowly    Elimination Interventions: Bed/chair exit alarm, Call light in reach, Patient to call for help with toileting needs    History of Falls Interventions: Bed/chair exit alarm

## 2021-03-08 NOTE — PROGRESS NOTES
PHYSICAL THERAPY TREATMENT  Patient: Rubi Wells (32 y.o. female)  Date: 3/8/2021  Diagnosis: Closed right hip fracture (HCC) [S72.001A]  Hip fracture ([address] [S72.009A] <principal problem not specified>  Procedure(s) (LRB):  Open Reduction Internal Fixation Right Proximal Humerus (Right) 4 Days Post-Op  Precautions:    Chart, physical therapy assessment, plan of care and goals were reviewed. ASSESSMENT  Patient continues with skilled PT services and is progressing towards goals. Pt semi-supine in bed upon arrival and agreeable to PT. Pt transferred semi-supine>sit CGA TE preformed on EOB see flow sheet below for exercises. STS CGA using rail on recliner. Pt able to maintain NWB on R UE at that time. Pt took ~3 steps forward and back using gt belt, railing on recliner, and CGA. Pt reported her balance felt \"off\" and she didn't know how far she could go. Pt requesting to sit back on EOB at that time. Assisted pt returning to semi-supine with Min A. Pt left semi-supine in bed with needs in place. Pt may benefit from A x2 next tx to progress amb distance with ra-walker/quad cane. Current Level of Function Impacting Discharge (mobility/balance): Decreased mobility, decreased balance     Other factors to consider for discharge: Decreased mobility, decreased balance, acute fractures. PLAN :  Patient continues to benefit from skilled intervention to address the above impairments. Continue treatment per established plan of care. to address goals. Recommendation for discharge: (in order for the patient to meet his/her long term goals)  Therapy 3 hours per day 5-7 days per week    This discharge recommendation:  Has been made in collaboration with the attending provider and/or case management    IF patient discharges home will need the following DME: to be determined (TBD)       SUBJECTIVE:   Patient stated my arm hurts today but, my leg feel a lot better.     OBJECTIVE DATA SUMMARY:   Critical Behavior:  Neurologic State: Alert  Orientation Level: Oriented X4  Cognition: Appropriate decision making  Safety/Judgement: Awareness of environment  Functional Mobility Training:  Bed Mobility:  Rolling: Contact guard assistance  Supine to Sit: Contact guard assistance  Sit to Supine: Minimum assistance  Scooting: Contact guard assistance        Transfers:  Sit to Stand: Contact guard assistance  Stand to Sit: Contact guard assistance                             Balance:  Sitting: Intact; Without support  Standing: Intact; With support  Standing - Static: Good  Standing - Dynamic : Fair  Ambulation/Gait Training:  Distance (ft): 3 Feet (ft)  Assistive Device: Gait belt; Other (comment)(using rail on recliner (see note))  Ambulation - Level of Assistance: Contact guard assistance           Right Side Weight Bearing: As tolerated     Base of Support: Narrowed     Speed/Isha: Slow  Step Length: Left shortened;Right shortened                    Stairs: Therapeutic Exercises:   Therapeutic Exercises:       EXERCISE   Sets   Reps   Active Active Assist   Passive Self ROM   Comments   Ankle Pumps   [x] [] [] [] X10 B LE   Quad Sets/Glut Sets   [] [] [] []    Hamstring Sets   [] [] [] []    Short Arc Quads   [] [] [] []    Heel Slides   [] [] [] []    Straight Leg Raises   [] [] [] []    Hip abd/add   [] [] [] []    Long Arc Quads   [x] [] [] [] X10 B LE   Marching   [] [] [] []       [] [] [] []       Pain Ratin/10    Activity Tolerance:   Fair and requires rest breaks  Please refer to the flowsheet for vital signs taken during this treatment. After treatment patient left in no apparent distress:   Supine in bed, Call bell within reach, and Bed / chair alarm activated    COMMUNICATION/COLLABORATION:   The patients plan of care was discussed with: Occupational therapist and Registered nurse.      Olga Lidia Andrea PTA   Time Calculation: 32 mins

## 2021-03-08 NOTE — PROGRESS NOTES
Orthopedic progress note    Date:3/8/2021       Room:[facility]  Patient Name:Gracia Tam     YOB: 1945     Age:76 y.o. Subjective    Status post ORIF right proximal humerus postop day #4, status post right hip hemiarthroplasty postop day #5. Patient doing well. Pain is better tolerated today. She is progressing well with therapy. No other complaints at this time.   Objective           Vitals Last 24 Hours:  TEMPERATURE:  Temp  Av °F (36.7 °C)  Min: 97.8 °F (36.6 °C)  Max: 98.2 °F (36.8 °C)  RESPIRATIONS RANGE: Resp  Av.8  Min: 18  Max: 21  PULSE OXIMETRY RANGE: SpO2  Av.2 %  Min: 91 %  Max: 93 %  PULSE RANGE: Pulse  Av.3  Min: 73  Max: 88  BLOOD PRESSURE RANGE: Systolic (18YRZ), UPL:467 , Min:138 , OCZ:007   ; Diastolic (04KAZ), BAJ:43, Min:66, Max:86    Current Facility-Administered Medications   Medication Dose Route Frequency    aspirin chewable tablet 81 mg  81 mg Oral DAILY    0.9% sodium chloride infusion 250 mL  250 mL IntraVENous PRN    diphenhydrAMINE (BENADRYL) capsule 25 mg  25 mg Oral Q6H PRN    acetaminophen (TYLENOL) tablet 1,000 mg  1,000 mg Oral Q6H    oxyCODONE IR (ROXICODONE) tablet 5 mg  5 mg Oral Q4H PRN    oxyCODONE IR (ROXICODONE) tablet 10 mg  10 mg Oral Q4H PRN    celecoxib (CELEBREX) capsule 200 mg  200 mg Oral BID    sodium chloride (NS) flush 5-40 mL  5-40 mL IntraVENous Q8H    sodium chloride (NS) flush 5-40 mL  5-40 mL IntraVENous PRN    naloxone (NARCAN) injection 0.4 mg  0.4 mg IntraVENous PRN    calcium-vitamin D 600 mg(1,500mg) -200 unit per tablet 1 Tab  1 Tab Oral TID WITH MEALS    senna-docusate (PERICOLACE) 8.6-50 mg per tablet 1 Tab  1 Tab Oral BID    polyethylene glycol (MIRALAX) packet 17 g  17 g Oral DAILY    bisacodyL (DULCOLAX) suppository 10 mg  10 mg Rectal DAILY PRN    amLODIPine (NORVASC) tablet 10 mg  10 mg Oral DAILY    furosemide (LASIX) tablet 40 mg  40 mg Oral DAILY    multivitamin (ONE A DAY) tablet 1 Tab  1 Tab Oral DAILY    pantoprazole (PROTONIX) tablet 40 mg  40 mg Oral ACB    oxybutynin (DITROPAN) tablet 5 mg  5 mg Oral BID    simvastatin (ZOCOR) tablet 20 mg  20 mg Oral QHS    polyethylene glycol (MIRALAX) packet 17 g  17 g Oral DAILY PRN    ondansetron (ZOFRAN ODT) tablet 4 mg  4 mg Oral Q8H PRN    Or    ondansetron (ZOFRAN) injection 4 mg  4 mg IntraVENous Q6H PRN          I/O (24Hr): Intake/Output Summary (Last 24 hours) at 3/8/2021 1144  Last data filed at 3/8/2021 0558  Gross per 24 hour   Intake    Output 1700 ml   Net -1700 ml     Objective  Labs/Imaging/Diagnostics    Labs:  CBC:  Recent Labs     03/08/21  0902 03/07/21  0535 03/06/21  0545   WBC 9.5 9.5 9.5   RBC 3.57* 3.30* 2.16*   HGB 10.3* 9.5* 6.2*   HCT 31.5* 29.2* 19.4*   MCV 88.2 88.5 89.8   RDW 13.2 13.5 13.4    210 191     CHEMISTRIES:  Recent Labs     03/06/21  0545      K 3.5      CO2 28   BUN 26*   CREA 0.81   CA 8.6   MG 2.0   PT/INR:No results for input(s): INR, INREXT in the last 72 hours. No lab exists for component: PROTIME  APTT:No results for input(s): APTT in the last 72 hours. LIVER PROFILE:  Recent Labs     03/06/21  0545   AST 19   ALT 13     Lab Results   Component Value Date/Time    ALT (SGPT) 13 03/06/2021 05:45 AM    AST (SGOT) 19 03/06/2021 05:45 AM    Alk. phosphatase 55 03/06/2021 05:45 AM    Bilirubin, total 0.2 03/06/2021 05:45 AM       Physical Exam:  Right upper extremity: Dressing clean dry and intact. Shoulder sling in place. Sensation intact right upper extremity. Good range of motion fingers of right hand without tenderness. EPL intact. Radial pulse palpable. Right upper extremity neurovascular intact. Right hip: Wound dressing clean dry and intact. Sensation intact throughout right lower extremity. No calf pain. EHL/DF/PF is 5 out of 5. Right lower extremity neurovascular intact.     Assessment//Plan           Patient Active Problem List    Diagnosis Date Noted    Closed right hip fracture ([facility] 03/02/2021    Hip fracture ([facility] 03/02/2021     Status post ORIF right proximal humerus. Postop day #4. Status post right hip hemiarthroplasty. Postop day #5. Continue with therapy as tolerated. Discontinue Voss catheter. Okay to discharge from orthopedics when placement has been made. Follow-up with Dr. Jose Alamo as an outpatient in approximately 2 weeks from discharge.       Electronically signed by Vito Smith PA-C on 3/8/2021 at 11:44 AM      Abbie Stovall MD   I agree with above findings and plan

## 2021-03-08 NOTE — DISCHARGE SUMMARY
Discharge Summary       PATIENT ID: Gina Gatica  MRN: [id number]   YOB: 1945    DATE OF ADMISSION: 3/2/2021  5:40 PM    DATE OF DISCHARGE:   PRIMARY CARE PROVIDER: Bindu Washington NP     ATTENDING PHYSICIAN: Ben Kennedy  DISCHARGING PROVIDER: Ben Kennedy      CONSULTATIONS: IP CONSULT TO ORTHOPEDIC SURGERY  IP CONSULT TO CARDIOLOGY    PROCEDURES/SURGERIES: Procedure(s):  Open Reduction Internal Fixation Right Proximal Humerus    ADMITTING DIAGNOSES:    Patient Active Problem List    Diagnosis Date Noted    Closed right hip fracture ([facility] 03/02/2021    Hip fracture ([facility] 03/02/2021       DISCHARGE DIAGNOSES / PLAN:      Right hip hemiarthroplasty  Open reduction and internal fixation of right humerus fracture  Right femur fracture  Left humerus fracture  GERD  Hyperlipidemia  History of CVA  Leukocytosis  Hypertension         DISCHARGE MEDICATIONS:  Current Discharge Medication List      START taking these medications    Details   aspirin 81 mg chewable tablet Take 1 Tab by mouth daily. Qty: 30 Tab, Refills: 0      celecoxib (CELEBREX) 200 mg capsule Take 1 Cap by mouth two (2) times a day for 90 days. Indications: acute pain following an operation  Qty: 60 Cap, Refills: 2      oxyCODONE IR (ROXICODONE) 5 mg immediate release tablet Take 1 Tab by mouth every four (4) hours as needed for Pain for up to 3 days. Max Daily Amount: 30 mg.  Qty: 10 Tab, Refills: 0    Associated Diagnoses: Other closed displaced fracture of proximal end of right humerus, initial encounter         CONTINUE these medications which have NOT CHANGED    Details   cranberry extract 450 mg tab tablet Take 450 mg by mouth. omeprazole (PRILOSEC) 40 mg capsule Take 40 mg by mouth daily. amLODIPine (NORVASC) 10 mg tablet Take  by mouth daily. oxybutynin (DITROPAN) 5 mg tablet Take 5 mg by mouth two (2) times a day. clopidogreL (Plavix) 75 mg tab Take  by mouth.       simvastatin (ZOCOR) 20 mg tablet Take  by mouth nightly. furosemide (LASIX) 40 mg tablet Take  by mouth daily. multivitamin (ONE A DAY) tablet Take 1 Tab by mouth daily. STOP taking these medications       meloxicam (MOBIC) 15 mg tablet Comments:   Reason for Stopping:                 NOTIFY YOUR PHYSICIAN FOR ANY OF THE FOLLOWING:   Fever over 101 degrees for 24 hours. Chest pain, shortness of breath, fever, chills, nausea, vomiting, diarrhea, change in mentation, falling, weakness, bleeding. Severe pain or pain not relieved by medications. Or, any other signs or symptoms that you may have questions about. DISPOSITION:  x  Home With:   OT  PT  HH  RN       Long term SNF/Inpatient Rehab    Independent/assisted living    Hospice    Other:       PATIENT CONDITION AT DISCHARGE: Stable      PHYSICAL EXAMINATION AT DISCHARGE:  General:          Alert, cooperative, no distress, appears stated age. HEENT:           Atraumatic, anicteric sclerae, pink conjunctivae                          No oral ulcers, mucosa moist, throat clear, dentition fair  Neck:               Supple, symmetrical  Lungs:             Clear to auscultation bilaterally. No Wheezing or Rhonchi. No rales. Chest wall:      No tenderness  No Accessory muscle use. Heart:              Regular  rhythm,  No  murmur   No edema  Abdomen:        Soft, non-tender. Not distended. Bowel sounds normal  Extremities:     No cyanosis. No clubbing,                            Skin turgor normal, Capillary refill normal  Skin:                Not pale. Not Jaundiced  No rashes   Psych:             Not anxious or agitated. Neurologic:      Alert, moves all extremities, answers questions appropriately and responds to commands     XR SHOULDER RT AP/LAT MIN 2 V   Final Result      XR FLUOROSCOPY UNDER 60 MINUTES   Final Result      XR PELV AP ONLY   Final Result      XR FEMUR RT 2 VS   Final Result   1. Acute subcapital femoral neck fracture on the right.    2. Old distal femoral metadiaphysis fracture. 3. Right knee arthroplasty. XR CHEST PORT   Final Result   1. Underexpanded lungs with minimal left basilar atelectasis. 2. Hiatal hernia. 3. Fracture left proximal humerus which may be acute. XR HIP RT W OR WO PELV 2-3 VWS   Final Result   FINDINGS: IMPRESSION: AP pelvis and limited lateral versus frog-leg imaging of   the right hip. Right femoral neck fracture with superior migration of the long bone fragment. No dislocation. Incomplete imaging of  mid femoral diaphyseal deformity from remote fracture. Intramedullary femoral oh has been removed since 2018. XR SHOULDER RT AP/LAT MIN 2 V   Final Result   FINDINGS: IMPRESSION: 3 images of the right shoulder. Transverse fracture of the humerus neck with almost complete displacement of the   long bone fragment medially. The humerus head maintains gross alignment with the   bony glenoid.            Recent Results (from the past 24 hour(s))   GLUCOSE, POC    Collection Time: 03/07/21  9:32 AM   Result Value Ref Range    Glucose (POC) 116 (H) 65 - 100 mg/dL    Performed by Stew Becker ([place])    ECHO ADULT COMPLETE    Collection Time: 03/07/21  1:11 PM   Result Value Ref Range    Aortic Valve Systolic Mean Gradient 81.67 mmHg    AoV VTI 59.80 cm    Aortic valve mean velocity 189.00 cm/s    Pulmonic Regurgitant End Max Velocity 297.00 cm/s    AoV PG 35.00 mmHg    Aortic Valve Area by Continuity of VTI 1.31 cm2    Aortic Valve Area by Continuity of Peak Velocity 1.39 cm2    IVSd 1.33 (A) 0.6 - 0.9 cm    LVIDd 3.71 (A) 3.9 - 5.3 cm    LVIDs 2.59 cm    LVOT d 1.80 cm    Left Ventricular Outflow Tract Mean Gradient 5.00 mmHg    LVOT VTI 30.90 cm    LVOT VTI 30.90 cm    Pulmonic Regurgitant End Max Velocity 163.00 cm/s    LVOT Peak Gradient 11.00 mmHg    LVPWd 1.25 (A) 0.6 - 0.9 cm    LV E' Septal Velocity 5.46 cm/s    LV ED Vol A2C 51.10 cm3    BP EF 58.3 55 - 100 %    LV ES Vol A2C 17.40 cm3    E/E' septal 11.87     LV Ejection Fraction MOD 2C 58.0 %    LVOT SV 78.0 cm3    Pulmonic Regurgitant End Max Velocity 342.00 cm/s    Mitral Valve Deceleration [place] 4,340.00 mm/s2    Mitral Valve Deceleration [place] 4,340.00 mm/s2    Mitral Valve E Wave Deceleration Time 158.00 ms    Mitral Valve Pressure Half-time 72.00 ms    MV A [place] 110.00 cm/s    MV E [place] 64.80 cm/s    MVA (PHT) 3.06 cm2    MV E/A 0.59     Pulmonic Regurgitant End Max Velocity 98.00 cm/s    Pulmonic Valve Systolic Peak Instantaneous Gradient 4.00 mmHg    P Vein A Dur 151.00 ms    Pulmonary Vein \"A\" Wave Velocity 34.60 cm/s    Est. RA Pressure 3.00 mmHg    RVIDd 3.05 cm    RVSP 35.00 mmHg    Tricuspid Valve Max Velocity 285.00 cm/s    Triscuspid Valve Regurgitation Peak Gradient 32.00 mmHg    Right Atrial Area 4C 15.00 cm2    LA Area 4C 24.19 cm2    LV Mass .2 67 - 162 g    LV Mass AL Index 95.5 43 - 95 g/m2    LUCINA/BSA Pk [place] 0.8 cm2/m2    LUCINA/BSA VTI 0.8 cm2/m2   GLUCOSE, POC    Collection Time: 03/07/21  1:14 PM   Result Value Ref Range    Glucose (POC) 103 (H) 65 - 100 mg/dL    Performed by Ro Wyatt ([place])    GLUCOSE, POC    Collection Time: 03/07/21  5:44 PM   Result Value Ref Range    Glucose (POC) 106 (H) 65 - 100 mg/dL    Performed by Ro Wyatt [facility]           HOSPITAL COURSE:    Patient is a 67y.o. year old female history of hypertension GERD hyperlipidemia history of CVA came to the ER after have a fall  Patient had a ground-level fall yesterday she lost her balance affording on a small rock denies any head injury came to the ER seen by the ER physician x-rays done shows left femur fracture and left humerus fracture and patient was recommend to be admitted for further evaluation treatment orthopedic was consulted patient have pain 8 out of 10 on morphine    Seen by the orthopedics and patient have right hip hemiarthroplasty and open reduction internal fixation of the right humerus fracture patient tolerated the procedure well this morning patient was alert awake denies any chest pain or shortness of breath nausea vomiting diarrhea no constipation  Seen by the orthopedic today and cleared for discharge    Patient want to go to inpatient rehab  Medication reconciliation done time discharge patient 35 minutes 50% time spent in counseling and coordination of care       Signed:   Tutu Murillo MD  3/8/2021  8:35 AM

## 2021-03-08 NOTE — PROGRESS NOTES
Problem: Self Care Deficits Care Plan (Adult)  Goal: *Acute Goals and Plan of Care (Insert Text)  Description: Pt will be SBA sup <> sit in prep for EOB ADLs  Pt will be SBA grooming sitting EOB  Pt will be min A LE dressing sitting EOB/long sit  Pt will be min A sit <>  prep for toileting LRAD  Pt will be min A toileting/toilet transfer/cloth mgmt LRAD  Pt will be IND following UE HEP in prep for self care tasks  Outcome: Progressing Towards Goal     Problem: Patient Education: Go to Patient Education Activity  Goal: Patient/Family Education  Outcome: Progressing Towards Goal   OCCUPATIONAL THERAPY TREATMENT  Patient: Melissa Crews (89 y.o. female)  Date: 3/8/2021  Diagnosis: Closed right hip fracture (HCC) [S72.001A]  Hip fracture ([address] [S72.009A] <principal problem not specified>  Procedure(s) (LRB):  Open Reduction Internal Fixation Right Proximal Humerus (Right) 4 Days Post-Op  Precautions:  NWB RUE, WBAT RLE, posterior hip precautions  Chart, occupational therapy assessment, plan of care, and goals were reviewed. ASSESSMENT  Patient continues with skilled OT services and is progressing towards goals. Pt seen this pm for cont OT. Pt tired from busy afternoon but agreeable for OT. Pt education discussed on hip precautions, no bending more than 90 degrees, no crossing legs and don't turn toes inward. Discussed NWB RUE. Pt performed right wrist, hand AROM and squeezing red ball, explained importance, rationale to moving distal RUE. Pt supine<>sit min A, for RLE management and cues for tech to maintain NWB RUE. Pt combed hair w/ LUE SBA. Pt don socks max A. Discussed reacher, sock aide, pt acknowledges knowing about the self help aides and knowing how to use from having in past. Pt c/o bottom itching, irritated, noted to have skin tear on left buttocks and liquid oozing on pad, RN came to assess and thought to be blister that popped.  Pt sit<>stand w/min A and using LUE on bar of back of recliner for standing balance while RN applied cream and changed pad. Pt tolerated standing about 2 minutes. Discussed w/ PTA, for next session to be a cotreat to trial using hemiwalker and need x2 for safety ambulating away from the bed. PLAN :  Patient continues to benefit from skilled intervention to address the above impairments. Continue treatment per established plan of care. to address goals. Recommend next OT session: RUE distal AROM, review hip precautions, trialing  hemiwalker for transfers, [facility] transfer    Recommendation for discharge: (in order for the patient to meet his/her long term goals)  Therapy 3 hours per day 5-7 days per week    This discharge recommendation:  Has been made in collaboration with the attending provider and/or case management    IF patient discharges home will need the following DME: AE: long handled bathing and AE: long handled dressing       SUBJECTIVE:   Patient stated I know how to use the reacher and sock aide.     OBJECTIVE DATA SUMMARY:   Cognitive/Behavioral Status:  Neurologic State: Alert  Orientation Level: Oriented X4  Cognition: Appropriate decision making        Safety/Judgement: Good awareness of safety precautions    Functional Mobility and Transfers for ADLs:  Bed Mobility:  Rolling: Contact guard assistance  Supine to Sit: Minimum assistance  Sit to Supine: Minimum assistance  Scooting: Contact guard assistance    Transfers:  Sit to Stand: Contact guard assistance;Minimum assistance          Balance:  Sitting: Intact; Without support  Standing: Intact; With support  Standing - Static: Good  Standing - Dynamic : Fair    ADL Intervention:       Grooming  Grooming Assistance: Supervision  Position Performed: Seated edge of bed  Brushing/Combing Hair: Supervision                   Lower Body Dressing Assistance  Socks: Maximum assistance         Cognitive Retraining  Safety/Judgement: Good awareness of safety precautions    Therapeutic Exercises:   Right wrist , hand AROM and squeezing red ball    Pain:  Right hip 2-3, RUE 3/10    Activity Tolerance:   Good  Please refer to the flowsheet for vital signs taken during this treatment.     After treatment patient left in no apparent distress:   Supine in bed, Heels elevated for pressure relief, Call bell within reach, and Bed / chair alarm activated    COMMUNICATION/COLLABORATION:   The patients plan of care was discussed with: Physical therapy assistant and Registered nurse. , case management    Sophia Maker  Time Calculation: 43 mins

## 2021-03-08 NOTE — PROGRESS NOTES
Progress Note    Patient: Jessica Baer MRN: [id number]  SSN: [id number]    YOB: 1945  Age: 67 y.o. Sex: female      Admit Date: 3/2/2021    LOS: 6 days     Subjective:     She denied having any pain. Denied having any shortness of breath. She is eager to work with physical therapy. (From clinic visit)    Mrs. Yeison Colbert is a 58-year-old female with history of left bundle branch block, hypertension, hyperlipidemia, advanced osteopenia/osteoarthritis status post bilateral knee arthroplasty. She had cardiac catheterization in 2013 that was normal.  During this admission in January 2020 she had an echo that showed EF of 55 to 60% with grade 1 diastolic dysfunction, mild LVH, aortic sclerosis without stenosis. She was sent from primary care physician office with possible ST elevation. Anyhow she was noted to be in left bundle branch block which was an old finding. She denied having any chest pain but she was having slurring speech and complaining of headache for about a week. CT head was nonacute. She was hypertensive in the emergency room. Was assessed for possible TIA. MRI brain showed nonacute and vessel occlusive changes. She was admitted on June 20. She was treated for UTI, sepsis with septic shock, acute hypoxemic respiratory failure. Early on she was treated for dehydration with IV fluids but subsequently went to pulmonary edema and transferred to the ICU. CT was negative for PE. Duplex of the vein were negative for DVT. And creatinine was 2.0 upon admission and improved to 0.98 upon discharge. Objective:     Vitals:    03/07/21 2030 03/07/21 2123 03/08/21 0156 03/08/21 0726   BP: (!) 152/70  (!) 141/69 (!) 165/86   Pulse: 86  75 73   Resp: 20 20 21   Temp: 98.1 °F (36.7 °C)  98 °F (36.7 °C) 98 °F (36.7 °C)   SpO2: 92% 92% 93% 93%   Weight:       Height:            Intake and Output:  Current Shift: No intake/output data recorded.   Last three shifts: 03/06 1901 - 03/08 0700  In: 1120 [P.O.:500]  Out: 4300 [Urine:4300]    Physical Exam:   General:  Alert, cooperative, no distress, appears stated age. Eyes:  Conjunctivae/corneas clear. PERRL, EOMs intact. Fundi benign   Ears:  Normal TMs and external ear canals both ears. Nose: Nares normal. Septum midline. Mucosa normal. No drainage or sinus tenderness. Mouth/Throat: Lips, mucosa, and tongue normal. Teeth and gums normal.   Neck: Supple, symmetrical, trachea midline, no adenopathy, thyroid: no enlargment/tenderness/nodules, no carotid bruit and no JVD. Back:   Symmetric, no curvature. ROM normal. No CVA tenderness. Lungs:   Clear to auscultation bilaterally. Heart:  Regular rate and rhythm, S1, S2 normal, no murmur, click, rub or gallop. Abdomen:   Soft, non-tender. Bowel sounds normal. No masses,  No organomegaly. Extremities: Extremities normal, atraumatic, no cyanosis or edema. Pulses: 2+ and symmetric all extremities. Skin: Skin color, texture, turgor normal. No rashes or lesions   Lymph nodes: Cervical, supraclavicular, and axillary nodes normal.   Neurologic: CNII-XII intact. Normal strength, sensation and reflexes throughout. Lab/Data Review: All lab results for the last 24 hours reviewed. Assessment:     Active Problems:    Closed right hip fracture ([address] (3/2/2021)      Hip fracture ([address] (3/2/2021)      Mrs. Cheryl Garcia is a 69-year-old female with:  1. Left bundle branch block, old  2. Hypertensive heart disease, EF 55 to 60% with grade 1 diastolic dysfunction  3. Aortic sclerosis without stenosis  4. Hypertensive urgency January 2020  5. Osteopenia  6. Status post right knee total total knee arthroplasty February 2018, left total knee arthroplasty 2013  7. Moderate to large hiatal hernia 2015  8. Hyperlipidemia  9. GERD  10. History of basal cell carcinoma status post excision  11. Normal coronary angiogram April 29, 2013  Plan:     Blood pressure is mildly elevated. She appears to be euvolemic. I encouraged patient to work on incentive spirometry. Currently on amlodipine, aspirin, Lasix, pantoprazole and simvastatin. Awaiting rehab placement.     Signed By: Arlene Nguyen MD     March 8, 2021

## 2021-03-08 NOTE — PROGRESS NOTES
Cm attempted to meet with pt at the bedside, however therapy is in the room working with the patient. CM to f/up.

## 2021-03-08 NOTE — PROGRESS NOTES
Reason for Admission:  Closed right hip fracture                     RUR Score:   10%                  Plan for utilizing home health:   No       PCP: First and Last name:  Shruthi Malcolm NP     Name of Practice:    Are you a current patient: Yes/No: Yes   Approximate date of last visit: Three weeks ago   Can you participate in a virtual visit with your PCP:                     Current Advanced Directive/Advance Care Plan: Full Code / Patient states she has an Advanced Directive, but she does not remember who she appointed to make medical decisions for her. Healthcare Decision Maker:   Click here to complete [id number] including selection of the Healthcare Decision Maker Relationship (ie \"Primary\")                             Transition of Care Plan:       IRF    Cm met with pt at the bedside. Cm verified home address and emergency contact -  Caroline Rogers (spouse). Pt ambulates without DME. PT is recommending IRF. CM discussed and educated IRF level of care. Pt is interested in IRF. Choice obtained for [facility] and [facility]. [facility] is pt's first preference. CM asked pt if she would like writer to call her  to notify him of her dc plan. Pt states she will notify her  of her dc plan. Pt states  is going to be visiting her this evening. Referral made via [address].     Pt's primary nurse already got a COVID test.

## 2021-03-08 NOTE — DISCHARGE INSTRUCTIONS

## 2021-03-09 LAB
BASOPHILS # BLD: 0 K/UL (ref 0–0.1)
BASOPHILS NFR BLD: 0 % (ref 0–1)
DIFFERENTIAL METHOD BLD: ABNORMAL
EOSINOPHIL # BLD: 0.6 K/UL (ref 0–0.4)
EOSINOPHIL NFR BLD: 6 % (ref 0–7)
ERYTHROCYTE [DISTWIDTH] IN BLOOD BY AUTOMATED COUNT: 13.2 % (ref 11.5–14.5)
HCT VFR BLD AUTO: 31.9 % (ref 35–47)
HGB BLD-MCNC: 10.3 G/DL (ref 11.5–16)
IMM GRANULOCYTES # BLD AUTO: 0.1 K/UL (ref 0–0.04)
IMM GRANULOCYTES NFR BLD AUTO: 1 % (ref 0–0.5)
LYMPHOCYTES # BLD: 3 K/UL (ref 0.8–3.5)
LYMPHOCYTES NFR BLD: 29 % (ref 12–49)
MCH RBC QN AUTO: 28.5 PG (ref 26–34)
MCHC RBC AUTO-ENTMCNC: 32.3 G/DL (ref 30–36.5)
MCV RBC AUTO: 88.1 FL (ref 80–99)
MONOCYTES # BLD: 0.9 K/UL (ref 0–1)
MONOCYTES NFR BLD: 9 % (ref 5–13)
NEUTS SEG # BLD: 5.8 K/UL (ref 1.8–8)
NEUTS SEG NFR BLD: 55 % (ref 32–75)
PLATELET # BLD AUTO: 330 K/UL (ref 150–400)
PMV BLD AUTO: 10.2 FL (ref 8.9–12.9)
RBC # BLD AUTO: 3.62 M/UL (ref 3.8–5.2)
SARS-COV-2, COV2: NORMAL
WBC # BLD AUTO: 10.3 K/UL (ref 3.6–11)

## 2021-03-09 PROCEDURE — 36415 COLL VENOUS BLD VENIPUNCTURE: CPT

## 2021-03-09 PROCEDURE — 85025 COMPLETE CBC W/AUTO DIFF WBC: CPT

## 2021-03-09 PROCEDURE — 74011250637 HC RX REV CODE- 250/637: Performed by: FAMILY MEDICINE

## 2021-03-09 PROCEDURE — 65270000029 HC RM PRIVATE

## 2021-03-09 PROCEDURE — 74011250637 HC RX REV CODE- 250/637: Performed by: ORTHOPAEDIC SURGERY

## 2021-03-09 PROCEDURE — 74011250637 HC RX REV CODE- 250/637: Performed by: PHYSICIAN ASSISTANT

## 2021-03-09 PROCEDURE — 97530 THERAPEUTIC ACTIVITIES: CPT

## 2021-03-09 RX ADMIN — ACETAMINOPHEN 1000 MG: 500 TABLET, FILM COATED ORAL at 13:22

## 2021-03-09 RX ADMIN — AMLODIPINE BESYLATE 10 MG: 5 TABLET ORAL at 08:53

## 2021-03-09 RX ADMIN — PANTOPRAZOLE SODIUM 40 MG: 40 TABLET, DELAYED RELEASE ORAL at 09:11

## 2021-03-09 RX ADMIN — Medication 1 TABLET: at 18:12

## 2021-03-09 RX ADMIN — DOCUSATE SODIUM 50 MG AND SENNOSIDES 8.6 MG 1 TABLET: 8.6; 5 TABLET, FILM COATED ORAL at 08:53

## 2021-03-09 RX ADMIN — ACETAMINOPHEN 1000 MG: 500 TABLET, FILM COATED ORAL at 05:58

## 2021-03-09 RX ADMIN — POLYETHYLENE GLYCOL 3350 17 G: 17 POWDER, FOR SOLUTION ORAL at 08:52

## 2021-03-09 RX ADMIN — SIMVASTATIN 20 MG: 10 TABLET, FILM COATED ORAL at 20:15

## 2021-03-09 RX ADMIN — Medication 1 TABLET: at 12:36

## 2021-03-09 RX ADMIN — Medication 10 ML: at 22:41

## 2021-03-09 RX ADMIN — DOCUSATE SODIUM 50 MG AND SENNOSIDES 8.6 MG 1 TABLET: 8.6; 5 TABLET, FILM COATED ORAL at 20:15

## 2021-03-09 RX ADMIN — Medication 10 ML: at 16:10

## 2021-03-09 RX ADMIN — FUROSEMIDE 40 MG: 40 TABLET ORAL at 08:52

## 2021-03-09 RX ADMIN — OXYBUTYNIN CHLORIDE 5 MG: 5 TABLET ORAL at 08:53

## 2021-03-09 RX ADMIN — MULTIVITAMIN TABLET 1 TABLET: TABLET at 08:53

## 2021-03-09 RX ADMIN — CELECOXIB 200 MG: 200 CAPSULE ORAL at 08:53

## 2021-03-09 RX ADMIN — OXYBUTYNIN CHLORIDE 5 MG: 5 TABLET ORAL at 20:15

## 2021-03-09 RX ADMIN — CELECOXIB 200 MG: 200 CAPSULE ORAL at 20:15

## 2021-03-09 RX ADMIN — Medication 1 TABLET: at 08:52

## 2021-03-09 RX ADMIN — ACETAMINOPHEN 1000 MG: 500 TABLET, FILM COATED ORAL at 20:15

## 2021-03-09 RX ADMIN — ASPIRIN 81 MG: 81 TABLET, CHEWABLE ORAL at 08:52

## 2021-03-09 RX ADMIN — Medication 10 ML: at 06:04

## 2021-03-09 NOTE — WOUND CARE
IP WOUND CONSULT Javier Amos MEDICAL RECORD NUMBER:  [id number] AGE: 67 y.o. GENDER: female  : 1945 TODAY'S DATE:  3/9/2021 GENERAL  
 
[] Follow-up [x] New Consult Javier Amos is a 67 y.o. female referred by:  
[] Physician 
[x] Nursing 
[] Other: PAST MEDICAL HISTORY Past Medical History:  
Diagnosis Date  Arthritis  Burning with urination  GERD (gastroesophageal reflux disease)  Hypercholesterolemia  Hypertension  Long term current use of anticoagulant therapy   
 plavix qd  Stroke ([address] PAST SURGICAL HISTORY Past Surgical History:  
Procedure Laterality Date  HX KNEE REPLACEMENT Left 2015  HX KNEE REPLACEMENT Right 2018  
 total  
 HX ORTHOPAEDIC Right 2019  
 femur fx repair oh placement FAMILY HISTORY Family History Problem Relation Age of Onset  Heart Disease Mother  Cancer Father   
     leukemia  Heart Disease Brother  Cancer Maternal Aunt   
     leukemia ALLERGIES Allergies Allergen Reactions  Morphine Itching MEDICATIONS No current facility-administered medications on file prior to encounter. Current Outpatient Medications on File Prior to Encounter Medication Sig Dispense Refill  cranberry extract 450 mg tab tablet Take 450 mg by mouth.  omeprazole (PRILOSEC) 40 mg capsule Take 40 mg by mouth daily.  amLODIPine (NORVASC) 10 mg tablet Take  by mouth daily.  oxybutynin (DITROPAN) 5 mg tablet Take 5 mg by mouth two (2) times a day.  clopidogreL (Plavix) 75 mg tab Take  by mouth.  simvastatin (ZOCOR) 20 mg tablet Take  by mouth nightly.  furosemide (LASIX) 40 mg tablet Take  by mouth daily.  meloxicam (MOBIC) 15 mg tablet Take 15 mg by mouth daily.  multivitamin (ONE A DAY) tablet Take 1 Tab by mouth daily. [unfilled] Visit Vitals /67 (BP 1 Location: Left upper arm, BP Patient Position:  At rest) Pulse 68 Temp 98.3 °F (36.8 °C) Resp 16 Ht 5' (1.524 m) Wt 75.8 kg (167 lb 1.7 oz) SpO2 92% BMI 32.64 kg/m² ASSESSMENT Wound Identification & Type: Skin tear to left buttock Dressing change: Zinc paste Verbal consent for picture: Yes Contributing Factors: anticoagulation therapy, decreased mobility and shear force Wound Buttocks Left Partial Thickness Tissue Loss 03/09/21 (Active) Wound Image   03/09/21 0950 Wound Etiology Skin Tear 03/09/21 0950 Cleansed Other (Comment) 03/09/21 9752 Dressing/Treatment Zinc paste 03/09/21 0950 Wound Assessment Pink/red 03/09/21 0950 Drainage Amount None 03/09/21 0950 Wound Odor None 03/09/21 0950 Gloria-Wound/Incision Assessment Blanchable erythema; Intact 03/09/21 0950 Edges Flat/open edges 03/09/21 0950 Wound Thickness Description Partial thickness 03/09/21 0950 Number of days: 0 Incision 03/03/21 Hip Right (Active) Dressing Status Clean; Intact;Dry 03/08/21 2048 Cleansed Not cleansed 03/08/21 0732 Dressing/Treatment Other (Comment) 03/08/21 2048 Drainage Amount None 03/08/21 0732 Wound Odor None 03/08/21 0732 Number of days: 6 Incision 03/04/21 Arm upper Right (Active) Dressing Status Dry; Intact; Clean 03/08/21 2048 Cleansed Not cleansed 03/08/21 0731 Dressing/Treatment Other (Comment) 03/08/21 2048 Closure Adhesive bandage 03/07/21 2000 Drainage Amount None 03/08/21 2048 Wound Odor None 03/08/21 0731 Number of days: 5 PLAN Skin Care & Pressure Relief Recommendations Speciality bed Waffle Overlay Minimize layers of linen Turn/reposition approximately every 2 hours Pillow wedges Offload heels pillows [address] 16 Blood Glucose: 106 on 3/6/21 Albumin: 2.2 on 3/6/21 WBCs: 10.3 on 3/9/21 Physician/Provider notified:  
Recommendations: zinc paste to buttocks TID and prn for soiling for moisture barrier (do not apply Mepilex Border as it will peel the skin); waffle overlay for pressure reduction; float heels with pillows while in bed; and turn at 30 degrees or more q2h while in bed. Patient is working with PT currently and ambulating well with a walker. Encourage time in chair 3 x daily with PT approval.  Will continue to follow. Teaching completed with:  
[] Patient          
[] Family member      
[] Caregiver         
[] Nursing 
[] Other Patient/Caregiver Teaching: 
Level of patient/caregiver understanding able to:  
[] Indicates understanding       [] Needs reinforcement 
[] Unsuccessful      [] Verbal Understanding 
[] Demonstrated understanding       [] No evidence of learning 
[] Refused teaching         [] N/A Electronically signed by Phoebe Valles RN on 3/9/2021 at 9:56 AM

## 2021-03-09 NOTE — PROGRESS NOTES
PHYSICAL THERAPY TREATMENT  Patient: Gracia Tam (76 y.o. female)  Date: 3/9/2021  Diagnosis: Closed right hip fracture (HCC) [S72.001A]  Hip fracture (HCC) [S72.009A] <principal problem not specified>  Procedure(s) (LRB):  Open Reduction Internal Fixation Right Proximal Humerus (Right) 5 Days Post-Op  Precautions:    Chart, physical therapy assessment, plan of care and goals were reviewed.    ASSESSMENT  Patient continues with skilled PT services and is progressing towards goals. Pt semi supine in bed upon PT/OT arrival and agreeable to session. Performed sup>sit with SBA. STS performed with min A. Ambulated with quad cane and CGAx2 and progressed to CGAx1 during session. Patient had no LOB or knee buckling noted. Increased time required due to slow gait speed and toileting. Educated to perform long sit therex, pt verbalized understanding.    Other factors to consider for discharge: PLOF, family assistance         PLAN :  Patient continues to benefit from skilled intervention to address the above impairments.  Continue treatment per established plan of care.  to address goals.    Recommendation for discharge: (in order for the patient to meet his/her long term goals)  Physical therapy at least 2 days/week in the home     This discharge recommendation:  Has been made in collaboration with the attending provider and/or case management    IF patient discharges home will need the following DME: quad cane       SUBJECTIVE:   Patient stated “I just feel so helpless.”    OBJECTIVE DATA SUMMARY:   Critical Behavior:  Neurologic State: Alert  Orientation Level: Oriented X4  Cognition: Appropriate decision making, Appropriate for age attention/concentration, Appropriate safety awareness  Safety/Judgement: Good awareness of safety precautions    Functional Mobility Training:  Bed Mobility:  Supine to Sit: Stand-by assistance  Scooting: Stand-by assistance    Transfers:  Sit to Stand: Minimum assistance  Stand to Sit:  Minimum assistance      Balance:  Sitting: Intact; Without support  Standing: Intact; With support  Standing - Static: Constant support;Good  Standing - Dynamic : Constant support;Good    Ambulation/Gait Training:  Distance (ft): 45 Feet (ft)  Assistive Device: Cane, quad;Gait belt  Ambulation - Level of Assistance: Contact guard assistance;Assist x2  Speed/Isha: Slow    Pain Rating:  3/10 RUE    Activity Tolerance:   Fair  Please refer to the flowsheet for vital signs taken during this treatment. After treatment patient left in no apparent distress:   Sitting in chair and Call bell within reach    COMMUNICATION/COLLABORATION:   The patients plan of care was discussed with: Occupational therapy assistant. Problem: Mobility Impaired (Adult and Pediatric)  Goal: *Acute Goals and Plan of Care (Insert Text)  Description: Patient will move from supine to sit and sit to supine , scoot up and down, and roll side to side in bed with min to mod assist within 7 day(s). Patient will transfer from bed to chair and chair to bed with mod assist and  the least restrictive device within 7 day(s). Patient will improve static sitting balance to minimal assistance within 1 week(s).   Ambulation goals to be revised after UE SX and WB status  Outcome: Progressing Towards Goal       Samir Smith PTA   Time Calculation: 43 mins

## 2021-03-09 NOTE — DISCHARGE SUMMARY
Discharge Summary       PATIENT ID: Jesus Benson  MRN: [id number]   YOB: 1945    DATE OF ADMISSION: 3/2/2021  5:40 PM    DATE OF DISCHARGE:   PRIMARY CARE PROVIDER: Rick Cline NP     ATTENDING PHYSICIAN: Gretel Lundborg Mohiuddin  DISCHARGING PROVIDER: Gretel Lundborg Mohiuddin      CONSULTATIONS: IP CONSULT TO ORTHOPEDIC SURGERY  IP CONSULT TO CARDIOLOGY    PROCEDURES/SURGERIES: Procedure(s):  Open Reduction Internal Fixation Right Proximal Humerus    ADMITTING DIAGNOSES:    Patient Active Problem List    Diagnosis Date Noted    Closed right hip fracture ([facility] 03/02/2021    Hip fracture ([facility] 03/02/2021       DISCHARGE DIAGNOSES / PLAN:      Right hip hemiarthroplasty  Open reduction and internal fixation of right humerus fracture  Right femur fracture  Left humerus fracture  GERD  Hyperlipidemia  History of CVA  Leukocytosis  Hypertension         DISCHARGE MEDICATIONS:  Current Discharge Medication List      START taking these medications    Details   aspirin 81 mg chewable tablet Take 1 Tab by mouth daily. Qty: 30 Tab, Refills: 0      celecoxib (CELEBREX) 200 mg capsule Take 1 Cap by mouth two (2) times a day for 90 days. Indications: acute pain following an operation  Qty: 60 Cap, Refills: 2      oxyCODONE IR (ROXICODONE) 5 mg immediate release tablet Take 1 Tab by mouth every four (4) hours as needed for Pain for up to 3 days. Max Daily Amount: 30 mg.  Qty: 10 Tab, Refills: 0    Associated Diagnoses: Other closed displaced fracture of proximal end of right humerus, initial encounter         CONTINUE these medications which have NOT CHANGED    Details   cranberry extract 450 mg tab tablet Take 450 mg by mouth. omeprazole (PRILOSEC) 40 mg capsule Take 40 mg by mouth daily. amLODIPine (NORVASC) 10 mg tablet Take  by mouth daily. oxybutynin (DITROPAN) 5 mg tablet Take 5 mg by mouth two (2) times a day. clopidogreL (Plavix) 75 mg tab Take  by mouth.       simvastatin (ZOCOR) 20 mg tablet Take  by mouth nightly. furosemide (LASIX) 40 mg tablet Take  by mouth daily. multivitamin (ONE A DAY) tablet Take 1 Tab by mouth daily. STOP taking these medications       meloxicam (MOBIC) 15 mg tablet Comments:   Reason for Stopping:                 NOTIFY YOUR PHYSICIAN FOR ANY OF THE FOLLOWING:   Fever over 101 degrees for 24 hours. Chest pain, shortness of breath, fever, chills, nausea, vomiting, diarrhea, change in mentation, falling, weakness, bleeding. Severe pain or pain not relieved by medications. Or, any other signs or symptoms that you may have questions about. DISPOSITION:  x  Home With:   OT  PT  HH  RN       Long term SNF/Inpatient Rehab    Independent/assisted living    Hospice    Other:       PATIENT CONDITION AT DISCHARGE: Stable      PHYSICAL EXAMINATION AT DISCHARGE:  General:          Alert, cooperative, no distress, appears stated age. HEENT:           Atraumatic, anicteric sclerae, pink conjunctivae                          No oral ulcers, mucosa moist, throat clear, dentition fair  Neck:               Supple, symmetrical  Lungs:             Clear to auscultation bilaterally. No Wheezing or Rhonchi. No rales. Chest wall:      No tenderness  No Accessory muscle use. Heart:              Regular  rhythm,  No  murmur   No edema  Abdomen:        Soft, non-tender. Not distended. Bowel sounds normal  Extremities:     No cyanosis. No clubbing,                            Skin turgor normal, Capillary refill normal  Skin:                Not pale. Not Jaundiced  No rashes   Psych:             Not anxious or agitated. Neurologic:      Alert, moves all extremities, answers questions appropriately and responds to commands     XR SHOULDER RT AP/LAT MIN 2 V   Final Result      XR FLUOROSCOPY UNDER 60 MINUTES   Final Result      XR PELV AP ONLY   Final Result      XR FEMUR RT 2 VS   Final Result   1. Acute subcapital femoral neck fracture on the right.    2. Old distal femoral metadiaphysis fracture. 3. Right knee arthroplasty. XR CHEST PORT   Final Result   1. Underexpanded lungs with minimal left basilar atelectasis. 2. Hiatal hernia. 3. Fracture left proximal humerus which may be acute. XR HIP RT W OR WO PELV 2-3 VWS   Final Result   FINDINGS: IMPRESSION: AP pelvis and limited lateral versus frog-leg imaging of   the right hip. Right femoral neck fracture with superior migration of the long bone fragment. No dislocation. Incomplete imaging of  mid femoral diaphyseal deformity from remote fracture. Intramedullary femoral oh has been removed since 2018. XR SHOULDER RT AP/LAT MIN 2 V   Final Result   FINDINGS: IMPRESSION: 3 images of the right shoulder. Transverse fracture of the humerus neck with almost complete displacement of the   long bone fragment medially. The humerus head maintains gross alignment with the   bony glenoid. Recent Results (from the past 24 hour(s))   CBC WITH AUTOMATED DIFF    Collection Time: 03/08/21  9:02 AM   Result Value Ref Range    WBC 9.5 3.6 - 11.0 K/uL    RBC 3.57 (L) 3.80 - 5.20 M/uL    HGB 10.3 (L) 11.5 - 16.0 g/dL    HCT 31.5 (L) 35.0 - 47.0 %    MCV 88.2 80.0 - 99.0 FL    MCH 28.9 26.0 - 34.0 PG    MCHC 32.7 30.0 - 36.5 g/dL    RDW 13.2 11.5 - 14.5 %    PLATELET 662 101 - 257 K/uL    MPV 10.6 8.9 - 12.9 FL    NEUTROPHILS 53 32 - 75 %    LYMPHOCYTES 29 12 - 49 %    MONOCYTES 11 5 - 13 %    EOSINOPHILS 6 0 - 7 %    BASOPHILS 0 0 - 1 %    IMMATURE GRANULOCYTES 1 (H) 0.0 - 0.5 %    ABS. NEUTROPHILS 5.2 1.8 - 8.0 K/UL    ABS. LYMPHOCYTES 2.8 0.8 - 3.5 K/UL    ABS. MONOCYTES 1.0 0.0 - 1.0 K/UL    ABS. EOSINOPHILS 0.6 (H) 0.0 - 0.4 K/UL    ABS. BASOPHILS 0.0 0.0 - 0.1 K/UL    ABS. IMM.  GRANS. 0.1 (H) 0.00 - 0.04 K/UL    DF AUTOMATED     CBC WITH AUTOMATED DIFF    Collection Time: 03/09/21  5:55 AM   Result Value Ref Range    WBC 10.3 3.6 - 11.0 K/uL    RBC 3.62 (L) 3.80 - 5.20 M/uL    HGB 10.3 (L) 11.5 - 16.0 g/dL    HCT 31.9 (L) 35.0 - 47.0 %    MCV 88.1 80.0 - 99.0 FL    MCH 28.5 26.0 - 34.0 PG    MCHC 32.3 30.0 - 36.5 g/dL    RDW 13.2 11.5 - 14.5 %    PLATELET 209 790 - 447 K/uL    MPV 10.2 8.9 - 12.9 FL    NEUTROPHILS 55 32 - 75 %    LYMPHOCYTES 29 12 - 49 %    MONOCYTES 9 5 - 13 %    EOSINOPHILS 6 0 - 7 %    BASOPHILS 0 0 - 1 %    IMMATURE GRANULOCYTES 1 (H) 0.0 - 0.5 %    ABS. NEUTROPHILS 5.8 1.8 - 8.0 K/UL    ABS. LYMPHOCYTES 3.0 0.8 - 3.5 K/UL    ABS. MONOCYTES 0.9 0.0 - 1.0 K/UL    ABS. EOSINOPHILS 0.6 (H) 0.0 - 0.4 K/UL    ABS. BASOPHILS 0.0 0.0 - 0.1 K/UL    ABS. IMM.  GRANS. 0.1 (H) 0.00 - 0.04 K/UL    DF AUTOMATED            HOSPITAL COURSE:    Patient is a 67y.o. year old female history of hypertension GERD hyperlipidemia history of CVA came to the ER after have a fall  Patient had a ground-level fall yesterday she lost her balance affording on a small rock denies any head injury came to the ER seen by the ER physician x-rays done shows left femur fracture and left humerus fracture and patient was recommend to be admitted for further evaluation treatment orthopedic was consulted patient have pain 8 out of 10 on morphine    Seen by the orthopedics and patient have right hip hemiarthroplasty and open reduction internal fixation of the right humerus fracture patient tolerated the procedure well this morning patient was alert awake denies any chest pain or shortness of breath nausea vomiting diarrhea no constipation  Seen by the orthopedic today and cleared for discharge    Today patient resting the bed alert awake nondistressed no new complaints waiting for placement to inpatient rehab      Signed:   Mari Cole MD  3/9/2021  8:35 AM

## 2021-03-09 NOTE — PROGRESS NOTES
OCCUPATIONAL THERAPY TREATMENT  Patient: Amena Valencia (13 y.o. female)  Date: 3/9/2021  Diagnosis: Closed right hip fracture ([facility] [address] [S72.001A]  Hip fracture ([facility] [address] [S72.009A] <principal problem not specified>  Procedure(s) (LRB):  Open Reduction Internal Fixation Right Proximal Humerus (Right) 5 Days Post-Op  Precautions:    Chart, occupational therapy assessment, plan of care, and goals were reviewed. ASSESSMENT  Patient continues with skilled OT services and is progressing towards goals. Pt. Received semi-supine in bed and agreeable to therapy session. Pt. Performed bed mobility with SBA, functional transfers with min A. Therapist provided education on use of quad cane, initially pt declined using but then willing to use quad cane. Pt.  Performed functional ambulated with quad cane and CGAx2 and progressed to CGAx1 during tx session. Patient performed bathroom mobility with CGA x2 and toilet transfer with Min A- Increased time required due to slow gait speed and time for toileting. Pt. Required total A for marcia-care while standing up right. Pt. Requires increased assistance with sit-> stand from commode d/t lower surface and NWB of the RUE. Pt able to demonstrated increased tolerance standing at sink performing oral care and with set- up assistance and CGA for balance. Pt. Re-educated on UE therex on RUE d/t pt limited in ROM and NBW, pt verbalized and demonstrated understanding. PLAN :  Patient continues to benefit from skilled intervention to address the above impairments. Continue treatment per established plan of care. to address goals.     Recommend next OT session: RUE distal AROM, review hip precautions, trialing  hemiwalker for transfers, [facility] transfer    Recommendation for discharge: (in order for the patient to meet his/her long term goals)  HHOT    This discharge recommendation:  Has been made in collaboration with the attending provider and/or case management    IF patient discharges home will need the following DME: AE: long handled bathing and AE: long handled dressing       SUBJECTIVE:   Patient stated  I feel hopeless .     OBJECTIVE DATA SUMMARY:   Cognitive/Behavioral Status:  Neurologic State: Alert  Orientation Level: Oriented X4  Cognition: Appropriate decision making; Appropriate for age attention/concentration; Appropriate safety awareness    Functional Mobility and Transfers for ADLs:  Bed Mobility:  Supine to Sit: Stand-by assistance  Scooting: Stand-by assistance    Transfers:  Sit to Stand: Minimum assistance  Functional Transfers  Bathroom Mobility: Contact guard assistance(x2)  Toilet Transfer : Minimum assistance  Bed to Chair: Minimum assistance    Balance:  Sitting: Intact; Without support  Standing: Intact; With support  Standing - Static: Constant support;Good  Standing - Dynamic : Constant support;Good    ADL Intervention:    Grooming  Grooming Assistance: Contact guard assistance  Position Performed: Standing  Washing Face: Set-up; Contact guard assistance  Washing Hands: Set-up; Contact guard assistance  Brushing Teeth: Set-up; Contact guard assistance    Toileting  Toileting Assistance: Stand-by assistance  Bladder Hygiene: Total assistance (dependent)  Bowel Hygiene: Total assistance (dependent)  Clothing Management: Moderate assistance      Therapeutic Exercises:   Pt re-educated on UE therex to perform on RUE squeezes of red ball, wrist, and finger AROM    Pain:  3/ 10 pain in R UE    Activity Tolerance:   Good  Please refer to the flowsheet for vital signs taken during this treatment. After treatment patient left in no apparent distress:   Sitting in chair, Heels elevated for pressure relief, and Call bell within reach    COMMUNICATION/COLLABORATION:   The patients plan of care was discussed with: Physical therapy assistant. Co-tx with PTA for increased safety with transfers and mobility.      Andrea Riley  Time Calculation: 43 mins  p  Problem: Self Care Deficits Care Plan (Adult)  Goal: *Acute Goals and Plan of Care (Insert Text)  Description: Pt will be SBA sup <> sit in prep for EOB ADLs  Pt will be SBA grooming sitting EOB  Pt will be min A LE dressing sitting EOB/long sit  Pt will be min A sit <>  prep for toileting LRAD  Pt will be min A toileting/toilet transfer/cloth mgmt LRAD  Pt will be IND following UE HEP in prep for self care tasks  Outcome: Progressing Towards Goal

## 2021-03-09 NOTE — PROGRESS NOTES
Comprehensive Nutrition Assessment    Type and Reason for Visit: RD nutrition re-screen/LOS    Nutrition Recommendations/Plan:   Continue regular diet  Honor pt preferences  Please document % of all meals/snacks consumed, BMs  Obtain measured body weight    Nutrition Assessment:  Admitted for right hip and right humerus fracture after fall; s/p orthopedic surgeries to right hip 3/3 and right humerus 3/4 . Pt endorses regular intake of 50-75% meals on regular diet this admission. Pt eager to start rehab and recovery; encouraged increased protein intake for healing. Labs and meds reviewed. Malnutrition Assessment:  Malnutrition Status:  No malnutrition      Nutrition Related Findings:  Appears well-nourished; NFPE not performed at this time. Denies c/s difficulty, denies N/V/C/D. Last BM today 3/9. No edema noted. Wounds:    Partial thickness, Surgical incision       Current Nutrition Therapies:  DIET REGULAR  DIET REGULAR No added salt    Anthropometric Measures:  · Height:  5' (152.4 cm)  · Current Body Wt:  75.8 kg (167 lb 1.7 oz)(3/7)   · Usual Body Wt:  75.3 kg (166 lb)     · Ideal Body Wt:  100 lbs:  167.1 %   · BMI Category:  Obese class 1 (BMI 30.0-34. 9)       Nutrition Diagnosis:   No nutrition diagnosis at this time     Nutrition Interventions:   Food and/or Nutrient Delivery: Continue current diet  Nutrition Education and Counseling: No recommendations at this time  Coordination of Nutrition Care: Continue to monitor while inpatient    Nutrition Monitoring and Evaluation:   Behavioral-Environmental Outcomes: None identified  Food/Nutrient Intake Outcomes: Food and nutrient intake  Physical Signs/Symptoms Outcomes: Weight, Skin    Discharge Planning:    No discharge needs at this time     Electronically signed by Jonh Rolle RD on 3/9/2021 at 3:36 PM    Contact: Ext [phone]

## 2021-03-09 NOTE — PROGRESS NOTES
Cm spoke with Emil Jeronimo from [address] [place] [address]. Pt accepted. Awaiting COVID results prior to pt discharging.

## 2021-03-10 LAB
BACTERIA SPEC CULT: NORMAL
BACTERIA SPEC CULT: NORMAL
SARS-COV-2, COV2NT: NOT DETECTED
SPECIAL REQUESTS,SREQ: NORMAL
SPECIAL REQUESTS,SREQ: NORMAL

## 2021-03-10 PROCEDURE — 74011250637 HC RX REV CODE- 250/637: Performed by: FAMILY MEDICINE

## 2021-03-10 PROCEDURE — 74011250637 HC RX REV CODE- 250/637: Performed by: ORTHOPAEDIC SURGERY

## 2021-03-10 PROCEDURE — 0PSF04Z REPOSITION RIGHT HUMERAL SHAFT WITH INTERNAL FIXATION DEVICE, OPEN APPROACH: ICD-10-PCS | Performed by: ORTHOPAEDIC SURGERY

## 2021-03-10 PROCEDURE — 65270000029 HC RM PRIVATE

## 2021-03-10 PROCEDURE — 74011250637 HC RX REV CODE- 250/637: Performed by: PHYSICIAN ASSISTANT

## 2021-03-10 PROCEDURE — 97530 THERAPEUTIC ACTIVITIES: CPT

## 2021-03-10 RX ADMIN — Medication 1 TABLET: at 08:59

## 2021-03-10 RX ADMIN — MULTIVITAMIN TABLET 1 TABLET: TABLET at 08:59

## 2021-03-10 RX ADMIN — DOCUSATE SODIUM 50 MG AND SENNOSIDES 8.6 MG 1 TABLET: 8.6; 5 TABLET, FILM COATED ORAL at 21:55

## 2021-03-10 RX ADMIN — Medication 1 TABLET: at 12:24

## 2021-03-10 RX ADMIN — OXYBUTYNIN CHLORIDE 5 MG: 5 TABLET ORAL at 21:55

## 2021-03-10 RX ADMIN — AMLODIPINE BESYLATE 10 MG: 5 TABLET ORAL at 08:59

## 2021-03-10 RX ADMIN — SIMVASTATIN 20 MG: 10 TABLET, FILM COATED ORAL at 21:56

## 2021-03-10 RX ADMIN — ACETAMINOPHEN 1000 MG: 500 TABLET, FILM COATED ORAL at 21:56

## 2021-03-10 RX ADMIN — OXYBUTYNIN CHLORIDE 5 MG: 5 TABLET ORAL at 08:59

## 2021-03-10 RX ADMIN — ACETAMINOPHEN 1000 MG: 500 TABLET, FILM COATED ORAL at 08:59

## 2021-03-10 RX ADMIN — DOCUSATE SODIUM 50 MG AND SENNOSIDES 8.6 MG 1 TABLET: 8.6; 5 TABLET, FILM COATED ORAL at 08:59

## 2021-03-10 RX ADMIN — ACETAMINOPHEN 1000 MG: 500 TABLET, FILM COATED ORAL at 02:00

## 2021-03-10 RX ADMIN — Medication 1 TABLET: at 17:51

## 2021-03-10 RX ADMIN — PANTOPRAZOLE SODIUM 40 MG: 40 TABLET, DELAYED RELEASE ORAL at 08:59

## 2021-03-10 RX ADMIN — ACETAMINOPHEN 1000 MG: 500 TABLET, FILM COATED ORAL at 15:55

## 2021-03-10 RX ADMIN — CELECOXIB 200 MG: 200 CAPSULE ORAL at 08:59

## 2021-03-10 RX ADMIN — FUROSEMIDE 40 MG: 40 TABLET ORAL at 08:59

## 2021-03-10 RX ADMIN — POLYETHYLENE GLYCOL 3350 17 G: 17 POWDER, FOR SOLUTION ORAL at 08:59

## 2021-03-10 RX ADMIN — ASPIRIN 81 MG: 81 TABLET, CHEWABLE ORAL at 09:00

## 2021-03-10 RX ADMIN — Medication 10 ML: at 05:54

## 2021-03-10 RX ADMIN — Medication 10 ML: at 22:08

## 2021-03-10 RX ADMIN — Medication 10 ML: at 15:55

## 2021-03-10 RX ADMIN — CELECOXIB 200 MG: 200 CAPSULE ORAL at 21:55

## 2021-03-10 NOTE — PROGRESS NOTES
Paged Dr. Lila Bautista. Patient is concerned about the cost of Celebrex ($90). Will notify Dr. Lila Bautista when he calls.

## 2021-03-10 NOTE — ROUTINE PROCESS
Bedside shift change report given to sahra steinberg rn (oncoming nurse) by Raissa Blackman rn (offgoing nurse). Report included the following information SBAR.

## 2021-03-10 NOTE — PROGRESS NOTES
PHYSICAL THERAPY TREATMENT  Patient: Vida Freedman (82 y.o. female)  Date: 3/10/2021  Diagnosis: Closed right hip fracture ([facility]) [S72.001A]  Hip fracture ([address] [S72.009A] <principal problem not specified>  Procedure(s) (LRB):  Open Reduction Internal Fixation Right Proximal Humerus (Right) 6 Days Post-Op  Precautions:    Chart, physical therapy assessment, plan of care and goals were reviewed. ASSESSMENT  Patient continues with skilled PT services and is progressing towards goals. Pt semi supine in bed upon arrival and requesting to get to the chair, but not wanting to ambulate. Patient frustrated due to recent testing at hospital, so therapist provided emotional support and listening. Performed bed mobility with SBA. STS required CGA. Bed>chair with use of quad cane and CGA. No LOB or knee buckling noted during transfer. Pt left resting comfortably in chair with call bell in reach and needs met. Other factors to consider for discharge: PLOF, family assist.         PLAN :  Patient continues to benefit from skilled intervention to address the above impairments. Continue treatment per established plan of care. to address goals. Recommendation for discharge: (in order for the patient to meet his/her long term goals)  HHPT with family assist    This discharge recommendation:  Has been made in collaboration with the attending provider and/or case management    IF patient discharges home will need the following DME: to be determined (TBD)       SUBJECTIVE:   Patient stated I would like to sit up in the chair, but not for four hours. That's too much.     OBJECTIVE DATA SUMMARY:   Critical Behavior:  Neurologic State: Alert  Orientation Level: Oriented X4  Cognition: Appropriate decision making, Appropriate for age attention/concentration, Follows commands  Safety/Judgement: Good awareness of safety precautions    Functional Mobility Training:  Bed Mobility:  Supine to Sit: Stand-by assistance  Sit to Supine: Stand-by assistance  Scooting: Stand-by assistance    Transfers:  Sit to Stand: Contact guard assistance  Stand to Sit: Contact guard assistance  Bed to Chair: Contact guard assistance    Balance:  Sitting: Intact  Standing: Intact  Standing - Static: Constant support;Good  Standing - Dynamic : Constant support;Good    Ambulation/Gait Training:  Distance (ft): 4 Feet (ft)  Assistive Device: Cane, quad  Ambulation - Level of Assistance: Contact guard assistance      Pain Ratin/10    Activity Tolerance:   Fair  Please refer to the flowsheet for vital signs taken during this treatment. After treatment patient left in no apparent distress:   Sitting in chair and Call bell within reach    COMMUNICATION/COLLABORATION:   The patients plan of care was discussed with: Registered nurse. Problem: Mobility Impaired (Adult and Pediatric)  Goal: *Acute Goals and Plan of Care (Insert Text)  Description: Patient will move from supine to sit and sit to supine , scoot up and down, and roll side to side in bed with min to mod assist within 7 day(s). Patient will transfer from bed to chair and chair to bed with mod assist and  the least restrictive device within 7 day(s). Patient will improve static sitting balance to minimal assistance within 1 week(s).   Ambulation goals to be revised after UE SX and WB status  Outcome: Progressing Towards Goal       Dennie Gail, YUN   Time Calculation: 22 mins

## 2021-03-10 NOTE — PROGRESS NOTES
OCCUPATIONAL THERAPY TREATMENT  Patient: Pierce Benjamin (46 y.o. female)  Date: 3/10/2021  Diagnosis: Closed right hip fracture ([facility] [address] [S72.001A]  Hip fracture ([facility] [address] [S72.009A] <principal problem not specified>  Procedure(s) (LRB):  Open Reduction Internal Fixation Right Proximal Humerus (Right) 6 Days Post-Op  Precautions:    Chart, occupational therapy assessment, plan of care, and goals were reviewed. ASSESSMENT  Patient continues with skilled OT services and is progressing towards goals. Pt. Received semi-supine in bed and agreeable to therapy session. Pt. Performed bed mobility with SBA, pt sat EOB for increased time today and completed UE therex with marivel Ue's and increased assistance with donning undergarments and  pants. Pt. Performed sit-> stand with CGA. Increased assistance with pulling pants up while pt standing d/t pt not able to use LUE. Pt. Demonstrated decreased standing tolerance requiring increased seated RB's at EOB while performing tasks. Pt. Declined sitting in chair this morning, pt performed sit-> supine with SBA. Current Level of Function Impacting Discharge (ADLs): increased assistance with dressing LB d/t LUE in sling, decreased standing tolerance. Other factors to consider for discharge: Family support, DME, time since onset, severity of deficits, IND at baseline         PLAN :  Patient continues to benefit from skilled intervention to address the above impairments. Continue treatment per established plan of care. to address goals. Recommendation for discharge: (in order for the patient to meet his/her long term goals)  OT    This discharge recommendation:  Has been made in collaboration with the attending provider and/or case management    IF patient discharges home will need the following DME: TBD       SUBJECTIVE:   Patient stated  I'm glad I was able to put my pants on today.     OBJECTIVE DATA SUMMARY:   Cognitive/Behavioral Status:  Neurologic State: Alert  Orientation Level: Oriented X4  Cognition: Appropriate decision making; Appropriate for age attention/concentration; Follows commands    Functional Mobility and Transfers for ADLs:  Bed Mobility:  Supine to Sit: Stand-by assistance  Sit to Supine: Stand-by assistance  Scooting: Stand-by assistance    Transfers:  Sit to Stand: Contact guard assistance      Balance:  Sitting: Intact; Without support  Standing: Intact; With support  Standing - Static: Constant support;Good  Standing - Dynamic : Constant support;Good    ADL Intervention:    Lower Body Dressing Assistance  Underpants: Moderate assistance  Protective Undergarmet: Moderate assistance  Position Performed: Seated edge of bed;Standing    Therapeutic Exercises:   Exercise Sets Reps AROM AAROM PROM Self PROM Comments   Shoulder flex/ext 1 10 [x] [] [] []  on RUE    Elbow flex/ext 1 10 [x] [] [] [] on RUE   Wrist flex/ext 1 10 [x] [] [] [] on RUE   Hand squeeze ball 1 15 [x] [] [] []  on LUE         Pain:  0/ 10 pain reported    Activity Tolerance:   Good  Please refer to the flowsheet for vital signs taken during this treatment. After treatment patient left in no apparent distress:   Supine in bed and Call bell within reach    COMMUNICATION/COLLABORATION:   The patients plan of care was discussed with: Registered nurseSharon Sanchez  Time Calculation: 47 mins    Problem: Self Care Deficits Care Plan (Adult)  Goal: *Acute Goals and Plan of Care (Insert Text)  Description: Pt will be SBA sup <> sit in prep for EOB ADLs  Pt will be SBA grooming sitting EOB  Pt will be min A LE dressing sitting EOB/long sit  Pt will be min A sit <>  prep for toileting LRAD  Pt will be min A toileting/toilet transfer/cloth mgmt LRAD  Pt will be IND following UE HEP in prep for self care tasks  Outcome: Progressing Towards Goal

## 2021-03-10 NOTE — PROGRESS NOTES
CM sent patient's negative covid result to [facility] as that is all they reported they were waiting on. Spoke with Kneny Pang the liaison at [address] [place] [address] at [phone] and she stated they do not have a bed available today. CM will follow up with patient regarding alternative placement.

## 2021-03-11 PROCEDURE — 97116 GAIT TRAINING THERAPY: CPT

## 2021-03-11 PROCEDURE — 74011250637 HC RX REV CODE- 250/637: Performed by: FAMILY MEDICINE

## 2021-03-11 PROCEDURE — 65270000029 HC RM PRIVATE

## 2021-03-11 PROCEDURE — 97530 THERAPEUTIC ACTIVITIES: CPT

## 2021-03-11 PROCEDURE — 74011250637 HC RX REV CODE- 250/637: Performed by: PHYSICIAN ASSISTANT

## 2021-03-11 PROCEDURE — 74011250637 HC RX REV CODE- 250/637: Performed by: ORTHOPAEDIC SURGERY

## 2021-03-11 RX ADMIN — POLYETHYLENE GLYCOL 3350 17 G: 17 POWDER, FOR SOLUTION ORAL at 09:04

## 2021-03-11 RX ADMIN — Medication 1 TABLET: at 12:38

## 2021-03-11 RX ADMIN — OXYBUTYNIN CHLORIDE 5 MG: 5 TABLET ORAL at 21:24

## 2021-03-11 RX ADMIN — OXYBUTYNIN CHLORIDE 5 MG: 5 TABLET ORAL at 09:04

## 2021-03-11 RX ADMIN — CELECOXIB 200 MG: 200 CAPSULE ORAL at 09:04

## 2021-03-11 RX ADMIN — Medication 10 ML: at 05:51

## 2021-03-11 RX ADMIN — ACETAMINOPHEN 1000 MG: 500 TABLET, FILM COATED ORAL at 17:50

## 2021-03-11 RX ADMIN — CELECOXIB 200 MG: 200 CAPSULE ORAL at 21:25

## 2021-03-11 RX ADMIN — DOCUSATE SODIUM 50 MG AND SENNOSIDES 8.6 MG 1 TABLET: 8.6; 5 TABLET, FILM COATED ORAL at 09:04

## 2021-03-11 RX ADMIN — ACETAMINOPHEN 1000 MG: 500 TABLET, FILM COATED ORAL at 12:38

## 2021-03-11 RX ADMIN — DOCUSATE SODIUM 50 MG AND SENNOSIDES 8.6 MG 1 TABLET: 8.6; 5 TABLET, FILM COATED ORAL at 21:24

## 2021-03-11 RX ADMIN — AMLODIPINE BESYLATE 10 MG: 5 TABLET ORAL at 09:04

## 2021-03-11 RX ADMIN — Medication 1 TABLET: at 17:50

## 2021-03-11 RX ADMIN — ASPIRIN 81 MG: 81 TABLET, CHEWABLE ORAL at 09:04

## 2021-03-11 RX ADMIN — MULTIVITAMIN TABLET 1 TABLET: TABLET at 09:04

## 2021-03-11 RX ADMIN — PANTOPRAZOLE SODIUM 40 MG: 40 TABLET, DELAYED RELEASE ORAL at 05:49

## 2021-03-11 RX ADMIN — Medication 10 ML: at 14:02

## 2021-03-11 RX ADMIN — Medication 10 ML: at 21:26

## 2021-03-11 RX ADMIN — SIMVASTATIN 20 MG: 10 TABLET, FILM COATED ORAL at 21:25

## 2021-03-11 RX ADMIN — ACETAMINOPHEN 1000 MG: 500 TABLET, FILM COATED ORAL at 05:49

## 2021-03-11 RX ADMIN — Medication 1 TABLET: at 09:04

## 2021-03-11 RX ADMIN — FUROSEMIDE 40 MG: 40 TABLET ORAL at 09:04

## 2021-03-11 NOTE — PROGRESS NOTES
CM spoke with Lio Willett from [address] [place] [address]. No bed today, but a bed will be available for patient tomorrow. CM to f/up with Lio Willett tomorrow morning.

## 2021-03-11 NOTE — PROGRESS NOTES
Skin assessment done by this nurse and Kevin Hobson RN,Patient has surgical incisions on the right upper arm and right hip both dressed with mepilex Ag,which is dry and intact. Has a skin tear to the left  Buttock which Z-guard is applied TID/PRN. No other skin issues identified.

## 2021-03-11 NOTE — PROGRESS NOTES
provided a follow up visit in response to staff referral.  consulted with RN prior to visit, engaged in conversation with patient regarding the 9 day journey of surgery and post surgical challenges and frustrations.  listened reflectively and empathically as patient reflected on questions of why God has allowed this to happen to her.  and patient brifely reflected on theodicy (how God works in this world). In addition, patient chose to transition into a conversation re her life, family and her extensive traveling. Patient indicated she appreciated the moments to talk of things other than surgery and pending rehab.  advised patient of chaplains availability for follow up upon further referrals. Patient is hopeful to be discharged tomorrow.     Visited by Chaplain King Ramesh, ALANIS, HILARY Sharma can be contacted by calling  and requesting  on call

## 2021-03-11 NOTE — PROGRESS NOTES
Orthopedic progress note    Date:3/11/2021       Room:[facility]  Patient Name:Gracia Tam     YOB: 1945     Age:76 y.o. Subjective    Status post ORIF right proximal humerus. Postop day #7. Status post right hip hemiarthroplasty postop day #8. Patient is doing well has no complaints of pain. She is upset regarding having to have repeat Covid test performed in her delaying being discharged to rehab. No other complaints at this time.   Objective           Vitals Last 24 Hours:  TEMPERATURE:  Temp  Av.9 °F (36.6 °C)  Min: 97.6 °F (36.4 °C)  Max: 98 °F (36.7 °C)  RESPIRATIONS RANGE: Resp  Av.2  Min: 16  Max: 18  PULSE OXIMETRY RANGE: SpO2  Av %  Min: 93 %  Max: 95 %  PULSE RANGE: Pulse  Av.6  Min: 68  Max: 83  BLOOD PRESSURE RANGE: Systolic (58JJB), RASHID:002 , Min:131 , FTC:956   ; Diastolic (12FBT), HL, Min:68, Max:81    Current Facility-Administered Medications   Medication Dose Route Frequency    aspirin chewable tablet 81 mg  81 mg Oral DAILY    0.9% sodium chloride infusion 250 mL  250 mL IntraVENous PRN    diphenhydrAMINE (BENADRYL) capsule 25 mg  25 mg Oral Q6H PRN    acetaminophen (TYLENOL) tablet 1,000 mg  1,000 mg Oral Q6H    oxyCODONE IR (ROXICODONE) tablet 5 mg  5 mg Oral Q4H PRN    oxyCODONE IR (ROXICODONE) tablet 10 mg  10 mg Oral Q4H PRN    celecoxib (CELEBREX) capsule 200 mg  200 mg Oral BID    sodium chloride (NS) flush 5-40 mL  5-40 mL IntraVENous Q8H    sodium chloride (NS) flush 5-40 mL  5-40 mL IntraVENous PRN    naloxone (NARCAN) injection 0.4 mg  0.4 mg IntraVENous PRN    calcium-vitamin D 600 mg(1,500mg) -200 unit per tablet 1 Tab  1 Tab Oral TID WITH MEALS    senna-docusate (PERICOLACE) 8.6-50 mg per tablet 1 Tab  1 Tab Oral BID    polyethylene glycol (MIRALAX) packet 17 g  17 g Oral DAILY    bisacodyL (DULCOLAX) suppository 10 mg  10 mg Rectal DAILY PRN    amLODIPine (NORVASC) tablet 10 mg  10 mg Oral DAILY    furosemide (LASIX) tablet 40 mg  40 mg Oral DAILY    multivitamin (ONE A DAY) tablet 1 Tab  1 Tab Oral DAILY    pantoprazole (PROTONIX) tablet 40 mg  40 mg Oral ACB    oxybutynin (DITROPAN) tablet 5 mg  5 mg Oral BID    simvastatin (ZOCOR) tablet 20 mg  20 mg Oral QHS    polyethylene glycol (MIRALAX) packet 17 g  17 g Oral DAILY PRN    ondansetron (ZOFRAN ODT) tablet 4 mg  4 mg Oral Q8H PRN    Or    ondansetron (ZOFRAN) injection 4 mg  4 mg IntraVENous Q6H PRN          I/O (24Hr): Intake/Output Summary (Last 24 hours) at 3/11/2021 0954  Last data filed at 3/10/2021 1539  Gross per 24 hour   Intake 240 ml   Output    Net 240 ml     Objective  Labs/Imaging/Diagnostics    Labs:  CBC:  Recent Labs     03/09/21  0555   WBC 10.3   RBC 3.62*   HGB 10.3*   HCT 31.9*   MCV 88.1   RDW 13.2        CHEMISTRIES:No results for input(s): NA, K, CL, CO2, BUN, CREA, CA, PHOS, MG in the last 72 hours. No lab exists for component: GLUCOSEPT/INR:No results for input(s): INR, INREXT in the last 72 hours. No lab exists for component: PROTIME  APTT:No results for input(s): APTT in the last 72 hours. LIVER PROFILE:No results for input(s): AST, ALT in the last 72 hours. No lab exists for component: Isela Mckeon ALKPHOS  Lab Results   Component Value Date/Time    ALT (SGPT) 13 03/06/2021 05:45 AM    AST (SGOT) 19 03/06/2021 05:45 AM    Alk. phosphatase 55 03/06/2021 05:45 AM    Bilirubin, total 0.2 03/06/2021 05:45 AM       Physical Exam:  Right upper extremity dressing clean dry and intact. Dressing removed by me. Incision line healing well. Sensation intact throughout right upper extremity.  strength is 5 out of 5. Right upper extremity neurovascular intact. Right hip: Dressing was removed by me. Incision site healing well. No calf pain.     Assessment//Plan           Patient Active Problem List    Diagnosis Date Noted    Closed right hip fracture ([address] 03/02/2021    Hip fracture ([facility] [place] [facility] 03/02/2021     Status post ORIF right proximal humerus. Postop day #7. Status post right hip hemiarthroplasty. Postop day #8. New Mepilex Ag dressing applied today. Patient is cleared to discharge from orthopedics when placement has been made. She will follow-up with Dr. Alaina Serrato as outpatient in approximately 1 week.     Electronically signed by Roberto Ventura PA-C on 3/11/2021 at 9:54 AM      Frederick Pardo MD   I agree with above findings and plan

## 2021-03-11 NOTE — PROGRESS NOTES
Problem: Mobility Impaired (Adult and Pediatric)  Goal: *Acute Goals and Plan of Care (Insert Text)  Description: Patient will move from supine to sit and sit to supine , scoot up and down, and roll side to side in bed with min to mod assist within 7 day(s). - goal met  Patient will transfer from bed to chair and chair to bed with mod assist and  the least restrictive device within 7 day(s). - goal met  Patient will improve static sitting balance to minimal assistance within 1 week(s). - goal met  Ambulation goals to be revised after UE SX and WB status  SBA for all transfers x 7 days- added at RA  Amb 75ft with QC and SBAx1 x7 days WBAT R LE, NWB R UE    Outcome: Progressing Towards Goal    PHYSICAL THERAPY TREATMENT: WEEKLY REASSESSMENT  Patient: Bev Osman (64 y.o. female)  Date: 3/11/2021  Primary Diagnosis: Closed right hip fracture (HCC) [S72.001A]  Hip fracture ([address] [S72.009A]  Procedure(s) (LRB):  Open Reduction Internal Fixation Right Proximal Humerus (Right) 7 Days Post-Op   Precautions: WBAT R LE, NWB R UE        ASSESSMENT  Pt seen for PT reassessment this session. Pt is now s/p ORIF R proximal humerus POD 7 and s/p R hip hemiarthoplasty POD 8. Pt has been progressing well with therapy and at this time has met some of her previously set goals. Goals were revised after session today as pt continues to improve with her mobility. Pt is now SBA with bed mob (notes from previous session as pt was sitting up in chair upon PT arrival today). Pt is CGA/SBA with sit to stand transfer from chair. Pt was able to amb approx 40ft in room with QC and CGA x1. No LOB during amb, pt does amb with wider SYEDA than her probable baseline. Pt was somewhat fatigued post ambulation, but overall did well with therapy. Pt is eager to continue her rehab and wants to get better.   Pt could likely go home with HHPT as she states her spouse is at her home with her at all times, however she states she wants to go to rehab and get as much function back as she can. Will defer to CM for d/c planning. Will continue to progress pt towards new stated goals as she is able to tolerate. Current d/c rec is HHPT, however pt wants IRF. PLAN :  Goals have been updated based on progression since last assessment. Patient continues to benefit from skilled intervention to address the above impairments. Recommendations and Planned Interventions: bed mobility training, transfer training, gait training, therapeutic exercises, patient and family training/education, and therapeutic activities      Frequency/Duration: Patient will be followed by physical therapy:  5 times a week to address goals.     Recommendation for discharge: (in order for the patient to meet his/her long term goals)  HHPT           SUBJECTIVE:   Patient sitting in chair upon PT arrival, agreeable to work with PT    OBJECTIVE DATA SUMMARY:   HISTORY:    Past Medical History:   Diagnosis Date    Arthritis     Burning with urination     GERD (gastroesophageal reflux disease)     Hypercholesterolemia     Hypertension     Long term current use of anticoagulant therapy     plavix qd    Stroke [facility])      Past Surgical History:   Procedure Laterality Date    HX KNEE REPLACEMENT Left 2015    HX KNEE REPLACEMENT Right 2018    total    HX ORTHOPAEDIC Right 04/2019    femur fx repair oh placement       Personal factors and/or comorbidities impacting plan of care:     Home Situation  Home Environment: Private residence  # Steps to Enter: 2  One/Two Story Residence: Two story, live on 1st floor  Living Alone: No  Support Systems: Spouse/Significant Other/Partner  Patient Expects to be Discharged to[de-identified] Rehabilitation facility  Current DME Used/Available at Home: None    EXAMINATION/PRESENTATION/DECISION MAKING:   Critical Behavior:  Neurologic State: Alert  Orientation Level: Oriented X4  Cognition: Appropriate decision making, Appropriate for age attention/concentration, Follows commands  Safety/Judgement: Good awareness of safety precautions  Hearing: Auditory  Auditory Impairment: None       Functional Mobility:    Transfers:  Sit to Stand: Stand-by assistance  Stand to Sit: Stand-by assistance                       Balance:   Sitting: Intact  Standing: Intact  Standing - Static: Constant support;Good  Standing - Dynamic : Constant support;Good  Ambulation/Gait Training:  Distance (ft): 40 Feet (ft)  Assistive Device: Cane, quad  Ambulation - Level of Assistance: Contact guard assistance           Right Side Weight Bearing: Non-weight bearing; As tolerated(R UE NWB, R LE WBAT)           Pain Rating:  No c/o pain during session today    Activity Tolerance:   Good  Please refer to the flowsheet for vital signs taken during this treatment. After treatment patient left in no apparent distress:   Sitting in chair and Call bell within reach    COMMUNICATION/EDUCATION:       Fall prevention education was provided and the patient/caregiver indicated understanding. and Patient/family have participated as able in goal setting and plan of care.     Thank you for this referral.  Cornelia Mccabe   Time Calculation: 23 mins

## 2021-03-11 NOTE — PROGRESS NOTES
Problem: Falls - Risk of  Goal: *Absence of Falls  Description: Document [place] Fall Risk and appropriate interventions in the flowsheet. Outcome: Progressing Towards Goal  Note: Fall Risk Interventions:  Mobility Interventions: Bed/chair exit alarm, OT consult for ADLs, Patient to call before getting OOB, PT Consult for mobility concerns         Medication Interventions: Bed/chair exit alarm, Patient to call before getting OOB, Teach patient to arise slowly    Elimination Interventions: Call light in reach, Bed/chair exit alarm    History of Falls Interventions: Bed/chair exit alarm         Problem: Patient Education: Go to Patient Education Activity  Goal: Patient/Family Education  Outcome: Progressing Towards Goal     Problem: Patient Education: Go to Patient Education Activity  Goal: Patient/Family Education  Outcome: Progressing Towards Goal     Problem: Pressure Injury - Risk of  Goal: *Prevention of pressure injury  Description: Document Wero Scale and appropriate interventions in the flowsheet. Outcome: Progressing Towards Goal  Note: Pressure Injury Interventions:  Sensory Interventions: Discuss PT/OT consult with provider, Keep linens dry and wrinkle-free, Maintain/enhance activity level, Minimize linen layers, Pressure redistribution bed/mattress (bed type)    Moisture Interventions: Absorbent underpads, Assess need for specialty bed, Minimize layers, Moisture barrier    Activity Interventions: Increase time out of bed, PT/OT evaluation    Mobility Interventions: HOB 30 degrees or less, PT/OT evaluation, Turn and reposition approx.  every two hours(pillow and wedges)    Nutrition Interventions: Document food/fluid/supplement intake, Offer support with meals,snacks and hydration    Friction and Shear Interventions: HOB 30 degrees or less, Foam dressings/transparent film/skin sealants, Minimize layers                Problem: Patient Education: Go to Patient Education Activity  Goal: Patient/Family Education  Outcome: Progressing Towards Goal     Problem: Patient Education: Go to Patient Education Activity  Goal: Patient/Family Education  Outcome: Progressing Towards Goal

## 2021-03-11 NOTE — PROGRESS NOTES
OCCUPATIONAL THERAPY TREATMENT  Patient: Xander Hammonds (34 y.o. female)  Date: 3/11/2021  Diagnosis: Closed right hip fracture (HCC) [S72.001A]  Hip fracture ([address] [S72.009A] <principal problem not specified>  Procedure(s) (LRB):  Open Reduction Internal Fixation Right Proximal Humerus (Right) 7 Days Post-Op  Precautions:    Chart, occupational therapy assessment, plan of care, and goals were reviewed. ASSESSMENT  Patient continues with skilled OT services and is progressing towards goals. Upon OT arrival, pt sitting in recliner with call bell activated. Pt agreed to participate in tx session and was requesting to go to the bathroom. Pt completed STS transfer using quad cane with SBA. Pt completed toilet transfer using grab bar with SBA and toilet hygiene with SBA. LB dressing of underwear and pants requiring ModA due to limiting function of RUE in sling. Pt completed oral and hand hygiene, face washing, and brushing hair while standing at sink with SBA. Pt returned to recliner and set up with lunch. Pt completed self feeding with setup assistance, requiring MaxA with container management due to sling on the RUE. Pt left sitting in recliner with lunch, call bell and phone within reach. Current Level of Function Impacting Discharge (ADLs): ModA with LE dressing, decreased standing tolerance    Other factors to consider for discharge: Family support, DME, time since onset, severity of deficits, IND at baseline         PLAN :  Patient continues to benefit from skilled intervention to address the above impairments. Continue treatment per established plan of care. to address goals.     Recommend next OT session: RUE distal AROM, review hip precautions, trialing  hemiwalker for transfers, [facility] transfer    Recommendation for discharge: (in order for the patient to meet his/her long term goals)  HHOT    This discharge recommendation:  Has been made in collaboration with the attending provider and/or case management    IF patient discharges home will need the following DME: AE: long handled bathing and AE: long handled dressing       SUBJECTIVE:   Patient stated I rang the call bell so I could go to the bathroom.     OBJECTIVE DATA SUMMARY:   Cognitive/Behavioral Status:  Neurologic State: Alert  Orientation Level: Oriented X4  Cognition: Appropriate decision making; Appropriate for age attention/concentration; Follows commands      Functional Mobility and Transfers for ADLs:  Bed Mobility:  Scooting: Stand-by assistance    Transfers:  Sit to Stand: Stand-by assistance  Functional Transfers  Bathroom Mobility: Stand-by assistance  Toilet Transfer : Stand-by assistance  Adaptive Equipment: Cane (comment)        Balance:  Sitting: Intact  Standing: Intact  Standing - Static: Constant support;Good  Standing - Dynamic : Constant support;Good    ADL Intervention:  Feeding  Container Management: Maximum assistance  Food to Mouth: Supervision    Grooming  Grooming Assistance: Stand-by assistance  Position Performed: Standing  Washing Face: Stand-by assistance  Washing Hands: Stand-by assistance  Brushing Teeth: Stand-by assistance  Brushing/Combing Hair: Stand-by assistance      Lower Body Dressing Assistance  Underpants: Moderate assistance  Pants With Elastic Waist: Moderate assistance  Position Performed: Standing      Pain:  0/10    Activity Tolerance:   Good  Please refer to the flowsheet for vital signs taken during this treatment. After treatment patient left in no apparent distress:   Sitting in chair and Call bell within reach    COMMUNICATION/COLLABORATION:   The patients plan of care was discussed with: Registered nurse.      VÍCTOR Alves  Time Calculation: 30 mins    Problem: Self Care Deficits Care Plan (Adult)  Goal: *Acute Goals and Plan of Care (Insert Text)  Description: Pt will be SBA sup <> sit in prep for EOB ADLs  Pt will be SBA grooming sitting EOB  Pt will be min A LE dressing sitting EOB/long sit  Pt will be min A sit <>  prep for toileting LRAD  Pt will be min A toileting/toilet transfer/cloth mgmt LRAD  Pt will be IND following UE HEP in prep for self care tasks  Outcome: Progressing Towards Goal

## 2021-03-11 NOTE — PROGRESS NOTES
Shift report given to Daniel Lee RN. Passed on report that we are still waiting for response from Dr. Adore Everett regarding the price of the Celebrex. Patient is awaiting bed at rehab.

## 2021-03-11 NOTE — PROGRESS NOTES
Problem: Falls - Risk of  Goal: *Absence of Falls  Description: Document [place] Flow Fall Risk and appropriate interventions in the flowsheet.   Outcome: Progressing Towards Goal  Note: Fall Risk Interventions:  Mobility Interventions: Bed/chair exit alarm, Patient to call before getting OOB, Strengthening exercises (ROM-active/passive)         Medication Interventions: Bed/chair exit alarm, Patient to call before getting OOB, Teach patient to arise slowly    Elimination Interventions: Call light in reach, Toileting schedule/hourly rounds    History of Falls Interventions: Bed/chair exit alarm, Investigate reason for fall

## 2021-03-11 NOTE — DISCHARGE SUMMARY
Discharge Summary       PATIENT ID: Yoko Pickens  MRN: [id number]   YOB: 1945    DATE OF ADMISSION: 3/2/2021  5:40 PM    DATE OF DISCHARGE:   PRIMARY CARE PROVIDER: Shruthi Malcolm NP     ATTENDING PHYSICIAN: Victoria Kennedy  DISCHARGING PROVIDER: Victoria Kennedy      CONSULTATIONS: IP CONSULT TO ORTHOPEDIC SURGERY  IP CONSULT TO CARDIOLOGY    PROCEDURES/SURGERIES: Procedure(s):  Open Reduction Internal Fixation Right Proximal Humerus    ADMITTING DIAGNOSES:    Patient Active Problem List    Diagnosis Date Noted    Closed right hip fracture ([facility] 03/02/2021    Hip fracture ([facility] 03/02/2021       DISCHARGE DIAGNOSES / PLAN:      Right hip hemiarthroplasty  Open reduction and internal fixation of right humerus fracture  Right femur fracture  Left humerus fracture  GERD  Hyperlipidemia  History of CVA  Leukocytosis  Hypertension         DISCHARGE MEDICATIONS:  Current Discharge Medication List      START taking these medications    Details   aspirin 81 mg chewable tablet Take 1 Tab by mouth daily. Qty: 30 Tab, Refills: 0      celecoxib (CELEBREX) 200 mg capsule Take 1 Cap by mouth two (2) times a day for 90 days. Indications: acute pain following an operation  Qty: 60 Cap, Refills: 2      oxyCODONE IR (ROXICODONE) 5 mg immediate release tablet Take 1 Tab by mouth every four (4) hours as needed for Pain for up to 3 days. Max Daily Amount: 30 mg.  Qty: 10 Tab, Refills: 0    Associated Diagnoses: Other closed displaced fracture of proximal end of right humerus, initial encounter         CONTINUE these medications which have NOT CHANGED    Details   cranberry extract 450 mg tab tablet Take 450 mg by mouth. omeprazole (PRILOSEC) 40 mg capsule Take 40 mg by mouth daily. amLODIPine (NORVASC) 10 mg tablet Take  by mouth daily. oxybutynin (DITROPAN) 5 mg tablet Take 5 mg by mouth two (2) times a day. clopidogreL (Plavix) 75 mg tab Take  by mouth.       simvastatin (ZOCOR) 20 mg tablet Take  by mouth nightly. furosemide (LASIX) 40 mg tablet Take  by mouth daily. multivitamin (ONE A DAY) tablet Take 1 Tab by mouth daily. STOP taking these medications       meloxicam (MOBIC) 15 mg tablet Comments:   Reason for Stopping:                 NOTIFY YOUR PHYSICIAN FOR ANY OF THE FOLLOWING:   Fever over 101 degrees for 24 hours. Chest pain, shortness of breath, fever, chills, nausea, vomiting, diarrhea, change in mentation, falling, weakness, bleeding. Severe pain or pain not relieved by medications. Or, any other signs or symptoms that you may have questions about. DISPOSITION:  x  Home With:   OT  PT  HH  RN       Long term SNF/Inpatient Rehab    Independent/assisted living    Hospice    Other:       PATIENT CONDITION AT DISCHARGE: Stable      PHYSICAL EXAMINATION AT DISCHARGE:  General:          Alert, cooperative, no distress, appears stated age. HEENT:           Atraumatic, anicteric sclerae, pink conjunctivae                          No oral ulcers, mucosa moist, throat clear, dentition fair  Neck:               Supple, symmetrical  Lungs:             Clear to auscultation bilaterally. No Wheezing or Rhonchi. No rales. Chest wall:      No tenderness  No Accessory muscle use. Heart:              Regular  rhythm,  No  murmur   No edema  Abdomen:        Soft, non-tender. Not distended. Bowel sounds normal  Extremities:     No cyanosis. No clubbing,                            Skin turgor normal, Capillary refill normal  Skin:                Not pale. Not Jaundiced  No rashes   Psych:             Not anxious or agitated. Neurologic:      Alert, moves all extremities, answers questions appropriately and responds to commands     XR SHOULDER RT AP/LAT MIN 2 V   Final Result      XR FLUOROSCOPY UNDER 60 MINUTES   Final Result      XR PELV AP ONLY   Final Result      XR FEMUR RT 2 VS   Final Result   1. Acute subcapital femoral neck fracture on the right.    2. Old distal femoral metadiaphysis fracture. 3. Right knee arthroplasty. XR CHEST PORT   Final Result   1. Underexpanded lungs with minimal left basilar atelectasis. 2. Hiatal hernia. 3. Fracture left proximal humerus which may be acute. XR HIP RT W OR WO PELV 2-3 VWS   Final Result   FINDINGS: IMPRESSION: AP pelvis and limited lateral versus frog-leg imaging of   the right hip. Right femoral neck fracture with superior migration of the long bone fragment. No dislocation. Incomplete imaging of  mid femoral diaphyseal deformity from remote fracture. Intramedullary femoral oh has been removed since 2018. XR SHOULDER RT AP/LAT MIN 2 V   Final Result   FINDINGS: IMPRESSION: 3 images of the right shoulder. Transverse fracture of the humerus neck with almost complete displacement of the   long bone fragment medially. The humerus head maintains gross alignment with the   bony glenoid. No results found for this or any previous visit (from the past 24 hour(s)).        HOSPITAL COURSE:    Patient is a 67y.o. year old female history of hypertension GERD hyperlipidemia history of CVA came to the ER after have a fall  Patient had a ground-level fall yesterday she lost her balance affording on a small rock denies any head injury came to the ER seen by the ER physician x-rays done shows left femur fracture and left humerus fracture and patient was recommend to be admitted for further evaluation treatment orthopedic was consulted patient have pain 8 out of 10 on morphine    Seen by the orthopedics and patient have right hip hemiarthroplasty and open reduction internal fixation of the right humerus fracture patient tolerated the procedure well this morning patient was alert awake denies any chest pain or shortness of breath nausea vomiting diarrhea no constipation  Seen by the orthopedic today and cleared for discharge      Patient today alert awake not in distress sitting on the side of the bed with the physical therapy    Covid screening negative     working on discharge to rehab      Signed:   Eloy Livingston MD  3/11/2021  8:35 AM

## 2021-03-12 VITALS
TEMPERATURE: 97.7 F | DIASTOLIC BLOOD PRESSURE: 77 MMHG | BODY MASS INDEX: 32.81 KG/M2 | RESPIRATION RATE: 18 BRPM | HEART RATE: 86 BPM | WEIGHT: 167.11 LBS | HEIGHT: 60 IN | OXYGEN SATURATION: 96 % | SYSTOLIC BLOOD PRESSURE: 157 MMHG

## 2021-03-12 PROCEDURE — 74011250637 HC RX REV CODE- 250/637: Performed by: FAMILY MEDICINE

## 2021-03-12 PROCEDURE — 97116 GAIT TRAINING THERAPY: CPT

## 2021-03-12 PROCEDURE — 74011250637 HC RX REV CODE- 250/637: Performed by: ORTHOPAEDIC SURGERY

## 2021-03-12 PROCEDURE — 74011250637 HC RX REV CODE- 250/637: Performed by: PHYSICIAN ASSISTANT

## 2021-03-12 RX ADMIN — POLYETHYLENE GLYCOL 3350 17 G: 17 POWDER, FOR SOLUTION ORAL at 08:30

## 2021-03-12 RX ADMIN — DOCUSATE SODIUM 50 MG AND SENNOSIDES 8.6 MG 1 TABLET: 8.6; 5 TABLET, FILM COATED ORAL at 08:31

## 2021-03-12 RX ADMIN — Medication 1 TABLET: at 08:31

## 2021-03-12 RX ADMIN — ACETAMINOPHEN 1000 MG: 500 TABLET, FILM COATED ORAL at 11:55

## 2021-03-12 RX ADMIN — CELECOXIB 200 MG: 200 CAPSULE ORAL at 08:30

## 2021-03-12 RX ADMIN — Medication 1 TABLET: at 11:55

## 2021-03-12 RX ADMIN — PANTOPRAZOLE SODIUM 40 MG: 40 TABLET, DELAYED RELEASE ORAL at 05:20

## 2021-03-12 RX ADMIN — ASPIRIN 81 MG: 81 TABLET, CHEWABLE ORAL at 08:30

## 2021-03-12 RX ADMIN — FUROSEMIDE 40 MG: 40 TABLET ORAL at 08:31

## 2021-03-12 RX ADMIN — Medication 10 ML: at 05:53

## 2021-03-12 RX ADMIN — AMLODIPINE BESYLATE 10 MG: 5 TABLET ORAL at 08:30

## 2021-03-12 RX ADMIN — MULTIVITAMIN TABLET 1 TABLET: TABLET at 08:31

## 2021-03-12 RX ADMIN — ACETAMINOPHEN 1000 MG: 500 TABLET, FILM COATED ORAL at 05:20

## 2021-03-12 RX ADMIN — OXYBUTYNIN CHLORIDE 5 MG: 5 TABLET ORAL at 08:31

## 2021-03-12 NOTE — PROGRESS NOTES
[facility] care ambulance here for the patient to take [facility] in satisfactory condition,No complains raised by the patient.

## 2021-03-12 NOTE — PROGRESS NOTES
Problem: Falls - Risk of  Goal: *Absence of Falls  Description: Document [place] Led Fall Risk and appropriate interventions in the flowsheet.   Outcome: Progressing Towards Goal  Note: Fall Risk Interventions:  Mobility Interventions: OT consult for ADLs, Patient to call before getting OOB, PT Consult for mobility concerns, Bed/chair exit alarm         Medication Interventions: Bed/chair exit alarm, Patient to call before getting OOB    Elimination Interventions: Bed/chair exit alarm, Call light in reach    History of Falls Interventions: Bed/chair exit alarm

## 2021-03-12 NOTE — PROGRESS NOTES
Discharge plan of care/case management plan validated with provider discharge order. Discharged to [address] 2018,Called and gave report to Liza Walker(RN),IV removed with no complications. Telemetry discontinued. Awaiting transportation with [employer].

## 2021-03-12 NOTE — PROGRESS NOTES
Discharge Summary       PATIENT ID: Pierce Benjamin  MRN: [id number]   YOB: 1945    DATE OF ADMISSION: 3/2/2021  5:40 PM    DATE OF DISCHARGE:   PRIMARY CARE PROVIDER: Ana Perdomo NP     ATTENDING PHYSICIAN: Maryanne Kennedy  DISCHARGING PROVIDER: Maryanne Kennedy      CONSULTATIONS: IP CONSULT TO ORTHOPEDIC SURGERY  IP CONSULT TO CARDIOLOGY    PROCEDURES/SURGERIES: Procedure(s):  Open Reduction Internal Fixation Right Proximal Humerus    ADMITTING DIAGNOSES:    Patient Active Problem List    Diagnosis Date Noted    Closed right hip fracture ([facility] [address] 03/02/2021    Hip fracture ([facility] [address] 03/02/2021       DISCHARGE DIAGNOSES / PLAN:      Right hip hemiarthroplasty  Open reduction and internal fixation of right humerus fracture  Right femur fracture  Left humerus fracture  GERD  Hyperlipidemia  History of CVA  Leukocytosis  Hypertension         DISCHARGE MEDICATIONS:  Current Discharge Medication List      START taking these medications    Details   aspirin 81 mg chewable tablet Take 1 Tab by mouth daily. Qty: 30 Tab, Refills: 0      celecoxib (CELEBREX) 200 mg capsule Take 1 Cap by mouth two (2) times a day for 90 days. Indications: acute pain following an operation  Qty: 60 Cap, Refills: 2      oxyCODONE IR (ROXICODONE) 5 mg immediate release tablet Take 1 Tab by mouth every four (4) hours as needed for Pain for up to 3 days. Max Daily Amount: 30 mg.  Qty: 10 Tab, Refills: 0    Associated Diagnoses: Other closed displaced fracture of proximal end of right humerus, initial encounter         CONTINUE these medications which have NOT CHANGED    Details   cranberry extract 450 mg tab tablet Take 450 mg by mouth. omeprazole (PRILOSEC) 40 mg capsule Take 40 mg by mouth daily. amLODIPine (NORVASC) 10 mg tablet Take  by mouth daily. oxybutynin (DITROPAN) 5 mg tablet Take 5 mg by mouth two (2) times a day. clopidogreL (Plavix) 75 mg tab Take  by mouth.       simvastatin (ZOCOR) 20 mg tablet Take  by mouth nightly. furosemide (LASIX) 40 mg tablet Take  by mouth daily. multivitamin (ONE A DAY) tablet Take 1 Tab by mouth daily. STOP taking these medications       meloxicam (MOBIC) 15 mg tablet Comments:   Reason for Stopping:                 NOTIFY YOUR PHYSICIAN FOR ANY OF THE FOLLOWING:   Fever over 101 degrees for 24 hours. Chest pain, shortness of breath, fever, chills, nausea, vomiting, diarrhea, change in mentation, falling, weakness, bleeding. Severe pain or pain not relieved by medications. Or, any other signs or symptoms that you may have questions about. DISPOSITION:  x  Home With:   OT  PT  HH  RN       Long term SNF/Inpatient Rehab    Independent/assisted living    Hospice    Other:       PATIENT CONDITION AT DISCHARGE: Stable      PHYSICAL EXAMINATION AT DISCHARGE:  General:          Alert, cooperative, no distress, appears stated age. HEENT:           Atraumatic, anicteric sclerae, pink conjunctivae                          No oral ulcers, mucosa moist, throat clear, dentition fair  Neck:               Supple, symmetrical  Lungs:             Clear to auscultation bilaterally. No Wheezing or Rhonchi. No rales. Chest wall:      No tenderness  No Accessory muscle use. Heart:              Regular  rhythm,  No  murmur   No edema  Abdomen:        Soft, non-tender. Not distended. Bowel sounds normal  Extremities:     No cyanosis. No clubbing,                            Skin turgor normal, Capillary refill normal  Skin:                Not pale. Not Jaundiced  No rashes   Psych:             Not anxious or agitated. Neurologic:      Alert, moves all extremities, answers questions appropriately and responds to commands     XR SHOULDER RT AP/LAT MIN 2 V   Final Result      XR FLUOROSCOPY UNDER 60 MINUTES   Final Result      XR PELV AP ONLY   Final Result      XR FEMUR RT 2 VS   Final Result   1. Acute subcapital femoral neck fracture on the right.    2. Old distal femoral metadiaphysis fracture. 3. Right knee arthroplasty. XR CHEST PORT   Final Result   1. Underexpanded lungs with minimal left basilar atelectasis. 2. Hiatal hernia. 3. Fracture left proximal humerus which may be acute. XR HIP RT W OR WO PELV 2-3 VWS   Final Result   FINDINGS: IMPRESSION: AP pelvis and limited lateral versus frog-leg imaging of   the right hip. Right femoral neck fracture with superior migration of the long bone fragment. No dislocation. Incomplete imaging of  mid femoral diaphyseal deformity from remote fracture. Intramedullary femoral oh has been removed since 2018. XR SHOULDER RT AP/LAT MIN 2 V   Final Result   FINDINGS: IMPRESSION: 3 images of the right shoulder. Transverse fracture of the humerus neck with almost complete displacement of the   long bone fragment medially. The humerus head maintains gross alignment with the   bony glenoid. No results found for this or any previous visit (from the past 24 hour(s)).        HOSPITAL COURSE:    Patient is a 67y.o. year old female history of hypertension GERD hyperlipidemia history of CVA came to the ER after have a fall  Patient had a ground-level fall yesterday she lost her balance affording on a small rock denies any head injury came to the ER seen by the ER physician x-rays done shows left femur fracture and left humerus fracture and patient was recommend to be admitted for further evaluation treatment orthopedic was consulted patient have pain 8 out of 10 on morphine    Seen by the orthopedics and patient have right hip hemiarthroplasty and open reduction internal fixation of the right humerus fracture patient tolerated the procedure well this morning patient was alert awake denies any chest pain or shortness of breath nausea vomiting diarrhea no constipation  Seen by the orthopedic today and cleared for discharge        Patient resting in the bed not in any distress  Not feeling well as not able to sleep all night    Want to see /regarding discharge planning  Informed assistant unit manager      Signed:   Horace Spatz, MD  3/12/2021  8:35 AM

## 2021-03-12 NOTE — PROGRESS NOTES
Orthopedic progress note    Date:3/12/2021       Room:[facility]  Patient Name:Gracia aTm     YOB: 1945     Age:76 y.o. Subjective    Status post ORIF right proximal humerus postop day #8, status post right hip hemiarthroplasty postop day #9. Patient doing well. No complaints of pain at this time. Patient still extremely upset about her discharge process. Tolerating therapy well. No other complaints at this time.   Objective           Vitals Last 24 Hours:  TEMPERATURE:  Temp  Av.1 °F (36.7 °C)  Min: 97.9 °F (36.6 °C)  Max: 98.2 °F (36.8 °C)  RESPIRATIONS RANGE: Resp  Av  Min: 18  Max: 18  PULSE OXIMETRY RANGE: SpO2  Av.2 %  Min: 93 %  Max: 96 %  PULSE RANGE: Pulse  Av.5  Min: 73  Max: 79  BLOOD PRESSURE RANGE: Systolic (86BHW), YLU:799 , Min:124 , WKQ:429   ; Diastolic (55KEU), FKO:32, Min:65, Max:80    Current Facility-Administered Medications   Medication Dose Route Frequency    aspirin chewable tablet 81 mg  81 mg Oral DAILY    0.9% sodium chloride infusion 250 mL  250 mL IntraVENous PRN    diphenhydrAMINE (BENADRYL) capsule 25 mg  25 mg Oral Q6H PRN    acetaminophen (TYLENOL) tablet 1,000 mg  1,000 mg Oral Q6H    oxyCODONE IR (ROXICODONE) tablet 5 mg  5 mg Oral Q4H PRN    oxyCODONE IR (ROXICODONE) tablet 10 mg  10 mg Oral Q4H PRN    celecoxib (CELEBREX) capsule 200 mg  200 mg Oral BID    sodium chloride (NS) flush 5-40 mL  5-40 mL IntraVENous Q8H    sodium chloride (NS) flush 5-40 mL  5-40 mL IntraVENous PRN    naloxone (NARCAN) injection 0.4 mg  0.4 mg IntraVENous PRN    calcium-vitamin D 600 mg(1,500mg) -200 unit per tablet 1 Tab  1 Tab Oral TID WITH MEALS    senna-docusate (PERICOLACE) 8.6-50 mg per tablet 1 Tab  1 Tab Oral BID    polyethylene glycol (MIRALAX) packet 17 g  17 g Oral DAILY    bisacodyL (DULCOLAX) suppository 10 mg  10 mg Rectal DAILY PRN    amLODIPine (NORVASC) tablet 10 mg  10 mg Oral DAILY    furosemide (LASIX) tablet 40 mg  40 mg Oral DAILY    multivitamin (ONE A DAY) tablet 1 Tab  1 Tab Oral DAILY    pantoprazole (PROTONIX) tablet 40 mg  40 mg Oral ACB    oxybutynin (DITROPAN) tablet 5 mg  5 mg Oral BID    simvastatin (ZOCOR) tablet 20 mg  20 mg Oral QHS    polyethylene glycol (MIRALAX) packet 17 g  17 g Oral DAILY PRN    ondansetron (ZOFRAN ODT) tablet 4 mg  4 mg Oral Q8H PRN    Or    ondansetron (ZOFRAN) injection 4 mg  4 mg IntraVENous Q6H PRN          I/O (24Hr): Intake/Output Summary (Last 24 hours) at 3/12/2021 1139  Last data filed at 3/12/2021 0521  Gross per 24 hour   Intake 1050 ml   Output    Net 1050 ml     Objective  Labs/Imaging/Diagnostics    Labs:  CBC:No results for input(s): WBC, RBC, HGB, HCT, MCV, RDW, PLT, HGBEXT, HCTEXT, PLTEXT in the last 72 hours. CHEMISTRIES:No results for input(s): NA, K, CL, CO2, BUN, CREA, CA, PHOS, MG in the last 72 hours. No lab exists for component: GLUCOSEPT/INR:No results for input(s): INR, INREXT in the last 72 hours. No lab exists for component: PROTIME  APTT:No results for input(s): APTT in the last 72 hours. LIVER PROFILE:No results for input(s): AST, ALT in the last 72 hours. No lab exists for component: Ninette July, ALKPHOS  Lab Results   Component Value Date/Time    ALT (SGPT) 13 03/06/2021 05:45 AM    AST (SGOT) 19 03/06/2021 05:45 AM    Alk. phosphatase 55 03/06/2021 05:45 AM    Bilirubin, total 0.2 03/06/2021 05:45 AM       Physical Exam:  Right upper extremity: Dressing clean dry and intact. No swelling seen the right upper extremity. Right upper extremity neurovascular intact. Right lower extremity: Dressing clean dry and intact. No swelling seen the right thigh. No calf pain to palpation. Right lower extremity neurovascularly intact.     Assessment//Plan           Patient Active Problem List    Diagnosis Date Noted    Closed right hip fracture ([address] 03/02/2021    Hip fracture ([facility] [address] 03/02/2021     Status post ORIF right proximal humerus postop day #8. Status post right hip hemiarthroplasty. Postop day #9. Continue with aspirin for DVT prophylaxis. Continue with therapy as tolerated. Okay to discharge from orthopedics and placement has been made. Patient to follow-up with Dr. Andra Sanz as outpatient approximately 1 week.       Electronically signed by Charan Agarwal PA-C on 3/12/2021 at 11:39 AM

## 2021-03-12 NOTE — PROGRESS NOTES
Problem: Falls - Risk of  Goal: *Absence of Falls  Description: Document Arlene Mirza Fall Risk and appropriate interventions in the flowsheet. Outcome: Progressing Towards Goal  Note: Fall Risk Interventions:  Mobility Interventions: OT consult for ADLs, Patient to call before getting OOB, PT Consult for mobility concerns, Bed/chair exit alarm         Medication Interventions: Bed/chair exit alarm, Patient to call before getting OOB    Elimination Interventions: Bed/chair exit alarm, Call light in reach    History of Falls Interventions: Bed/chair exit alarm         Problem: Patient Education: Go to Patient Education Activity  Goal: Patient/Family Education  Outcome: Progressing Towards Goal     Problem: Patient Education: Go to Patient Education Activity  Goal: Patient/Family Education  Outcome: Progressing Towards Goal     Problem: Patient Education: Go to Patient Education Activity  Goal: Patient/Family Education  Outcome: Progressing Towards Goal     Problem: Pressure Injury - Risk of  Goal: *Prevention of pressure injury  Description: Document Wero Scale and appropriate interventions in the flowsheet. Outcome: Progressing Towards Goal  Note: Pressure Injury Interventions:  Sensory Interventions: Keep linens dry and wrinkle-free, Maintain/enhance activity level, Minimize linen layers, Pressure redistribution bed/mattress (bed type), Turn and reposition approx. every two hours (pillows and wedges if needed)    Moisture Interventions: Absorbent underpads, Apply protective barrier, creams and emollients, Minimize layers, Moisture barrier    Activity Interventions: Increase time out of bed, PT/OT evaluation, Pressure redistribution bed/mattress(bed type)    Mobility Interventions: HOB 30 degrees or less, PT/OT evaluation, Turn and reposition approx.  every two hours(pillow and wedges), Pressure redistribution bed/mattress (bed type)    Nutrition Interventions: Document food/fluid/supplement intake    Friction and Shear Interventions: HOB 30 degrees or less, Minimize layers                Problem: Patient Education: Go to Patient Education Activity  Goal: Patient/Family Education  Outcome: Progressing Towards Goal     Problem: Patient Education: Go to Patient Education Activity  Goal: Patient/Family Education  Outcome: Progressing Towards Goal

## 2021-03-12 NOTE — PROGRESS NOTES
PHYSICAL THERAPY TREATMENT  Patient: Ravinder Torres (74 y.o. female)  Date: 3/12/2021  Diagnosis: Closed right hip fracture (HCC) [S72.001A]  Hip fracture ([address] [S72.009A] <principal problem not specified>  Procedure(s) (LRB):  Open Reduction Internal Fixation Right Proximal Humerus (Right) 8 Days Post-Op  Precautions:    Chart, physical therapy assessment, plan of care and goals were reviewed. ASSESSMENT  Patient continues with skilled PT services and is progressing towards goals. Improved mobility this visit and was able to progress ambulation distance slightly today. Patient ambulated approx. 60 ft in the room and another 10 ft to bathroom. Patient req some assistance with dressing from the toilet but was CGA-SBA for all mobility for increased safety. Patient demonstrated improved standing balance with activity, static and dynamic in the bathroom completing ADL tasks per request.Will continue to progress towards goals. Current Level of Function Impacting Discharge (mobility/balance): slow mobility    Other factors to consider for discharge: assistance at home         PLAN :  Patient continues to benefit from skilled intervention to address the above impairments. Continue treatment per established plan of care. to address goals.     Recommendation for discharge: (in order for the patient to meet his/her long term goals)  [facility]    This discharge recommendation:  Has been made in collaboration with the attending provider and/or case management    IF patient discharges home will need the following DME: quad cane       SUBJECTIVE:   Patient stated I am just ready to go, I have been here 12 days    OBJECTIVE DATA SUMMARY:   Critical Behavior:  Neurologic State: Alert  Orientation Level: Oriented X4  Cognition: Appropriate decision making, Appropriate for age attention/concentration, Appropriate safety awareness, Follows commands  Safety/Judgement: Good awareness of safety precautions  Functional Mobility Training:  Bed Mobility:  Supine to Sit: Stand-by assistance  Scooting: Stand-by assistance        Transfers:  Sit to Stand: Contact guard assistance  Stand to Sit: Contact guard assistance      Balance:  Sitting: Intact  Standing: Intact; With support  Standing - Static: Constant support  Standing - Dynamic : Constant support  Ambulation/Gait Training:  Distance (ft): 70 Feet (ft)(10 ft to bathroom, 60 ft)  Assistive Device: Cane, quad  Ambulation - Level of Assistance: Contact guard assistance       Pain Ratin/10     Activity Tolerance:   Good  Please refer to the flowsheet for vital signs taken during this treatment. After treatment patient left in no apparent distress:   Sitting in chair and Call bell within reach      Doris Choe   Time Calculation: 32 mins         Problem: Mobility Impaired (Adult and Pediatric)  Goal: *Acute Goals and Plan of Care (Insert Text)  Description: Patient will move from supine to sit and sit to supine , scoot up and down, and roll side to side in bed with min to mod assist within 7 day(s). - goal met  Patient will transfer from bed to chair and chair to bed with mod assist and  the least restrictive device within 7 day(s). - goal met  Patient will improve static sitting balance to minimal assistance within 1 week(s). - goal met  Ambulation goals to be revised after UE SX and WB status  SBA for all transfers x 7 days- added at RA  Amb 75ft with QC and SBAx1 x7 days WBAT R LE, NWB R UE    Outcome: Progressing Towards Goal

## 2021-03-12 NOTE — PROGRESS NOTES
CM received a phone call from [place] with [address] [place] [address]. [place] indicated pt's bed will not be ready until Octavia Dash is requesting a transportation  for 3:30-4pm. Accepting physician is Dr. Michelle Greene. Pt will discharge to [address] [place] [address]. Room# 2018 and report: [id number]. Discharge plan of care/case management plan validated with provider's discharge order.

## 2021-03-12 NOTE — PROGRESS NOTES
JOHANN spoke with Omar Dubon from [address] [place] [address] this morning. [facility] has a bed. Omar Dubon is requesting transportation set up for after lunch. Omar Dubon will message CM via [employer] for room# and report to call. [facility] [address] [place] [address]. Cm updated pt's primary nurse. CM met with pt and pt's  at the bedside. Cm informed pt [facility] has a bed for her today and is requesting she get picked up after lunch.

## 2021-04-10 NOTE — ANESTHESIA POSTPROCEDURE EVALUATION
Procedure(s):  Open Reduction Internal Fixation Right Proximal Humerus.     general    Anesthesia Post Evaluation      Multimodal analgesia: multimodal analgesia used between 6 hours prior to anesthesia start to PACU discharge  Patient location during evaluation: PACU  Patient participation: complete - patient participated  Level of consciousness: awake  Pain score: 0  Pain management: adequate  Airway patency: patent  Anesthetic complications: no  Cardiovascular status: acceptable  Respiratory status: acceptable  Hydration status: acceptable  Post anesthesia nausea and vomiting:  controlled  Final Post Anesthesia Temperature Assessment:  Normothermia (36.0-37.5 degrees C)      INITIAL Post-op Vital signs:   Vitals Value Taken Time   /68 03/04/21 2130   Temp 37 °C (98.6 °F) 03/04/21 2130   Pulse 99 03/04/21 2130   Resp 20 03/04/21 2130   SpO2 98 % 03/04/21 2130

## (undated) DEVICE — SCREW BNE L28MM DIA3.5MM STD DST CORT TIB TI ST
Type: IMPLANTABLE DEVICE | Site: HUMERUS | Status: NON-FUNCTIONAL
Removed: 2021-03-04

## (undated) DEVICE — DRAPE EQUIP C ARM 74X42 IN MOB XR W/ TIE RUBBER BND LF

## (undated) DEVICE — BASIC SINGLE BASIN-LF: Brand: MEDLINE INDUSTRIES, INC.

## (undated) DEVICE — 2108 SERIES SAGITTAL BLADE FLARED, GROUND  (19.0 X 1.37 X 90.0MM)

## (undated) DEVICE — DRAPE, HEAVY DUTY, STERILE. FOR A 6' BIG CASE BACK TABLE MODEL 429: Brand: OR SPECIFIC

## (undated) DEVICE — STERILE LATEX POWDER-FREE SURGICAL GLOVESWITH NITRILE AND EMOLLIENT COATINGS: Brand: PROTEXIS

## (undated) DEVICE — DRAPE,SPLIT ,77X120: Brand: MEDLINE

## (undated) DEVICE — BIT DRL RMFG CALIB 2.7MM --

## (undated) DEVICE — BOWL BNE CEM MIX SPAT CURET SMARTMIX CTS

## (undated) DEVICE — SLING ARM L ABV 13IN DE-ROTATION STRP HOOKS PROVIDE IMMOB

## (undated) DEVICE — Device

## (undated) DEVICE — STRYKER PERFORMANCE SERIES SAGITTAL BLADE: Brand: STRYKER PERFORMANCE SERIES

## (undated) DEVICE — SUTURE STRATAFIX SYMMETRIC PDS + SZ 0 L18IN ABSRB L36MM SXPP1A401

## (undated) DEVICE — DRAPE,REIN 53X77,STERILE: Brand: MEDLINE

## (undated) DEVICE — SUT VCRL + 0 27IN CT1 UD --

## (undated) DEVICE — SOL INJ D5% SOD CL 0.9% 1000ML --

## (undated) DEVICE — 3M™ STERI-DRAPE™ INCISE DRAPE 1050 (60CM X 45CM): Brand: STERI-DRAPE™

## (undated) DEVICE — STERILE POLYISOPRENE POWDER-FREE SURGICAL GLOVES: Brand: PROTEXIS

## (undated) DEVICE — INTENT TO BE USED WITH SUTURE MATERIAL FOR TISSUE CLOSURE: Brand: RICHARD-ALLAN® NEEDLE 1/2 CIRCLE TAPER

## (undated) DEVICE — 3M™ IOBAN™ 2 ANTIMICROBIAL INCISE DRAPE 6650EZ: Brand: IOBAN™ 2

## (undated) DEVICE — TURNOVER KIT OR CUST CMB FLD GEN LF

## (undated) DEVICE — 3M™ STERI-DRAPE™ U-DRAPE 1015: Brand: STERI-DRAPE™

## (undated) DEVICE — SUT VCRL + 2-0 27IN CT2 UD -- 36/BX

## (undated) DEVICE — ROCKER SWITCH PENCIL BLADE ELECTRODE, HOLSTER: Brand: EDGE

## (undated) DEVICE — MAJOR ORTHO PROCEDURE PACK: Brand: MEDLINE INDUSTRIES, INC.

## (undated) DEVICE — INTENDED FOR TISSUE SEPARATION, AND OTHER PROCEDURES THAT REQUIRE A SHARP SURGICAL BLADE TO PUNCTURE OR CUT.: Brand: BARD-PARKER ®  SAFETY SCALPED

## (undated) DEVICE — PENCIL ES 2200DEG HI TEMP CUTERY FN TIP BTTRY OPERATED

## (undated) DEVICE — SUTURE FIBERWIRE SZ 2 W/ TAPERED NEEDLE BLUE L38IN NONABSORB BLU L26.5MM 1/2 CIRCLE AR7200

## (undated) DEVICE — TURNOVER KIT A GEN SURG

## (undated) DEVICE — SYR 50ML LR LCK 1ML GRAD NSAF --

## (undated) DEVICE — STERILE POLYISOPRENE POWDER-FREE SURGICAL GLOVES WITH EMOLLIENT COATING: Brand: PROTEXIS

## (undated) DEVICE — GAUZE,SPONGE,4"X4",16PLY,STRL,LF,10/TRAY: Brand: MEDLINE

## (undated) DEVICE — T5 HOOD WITH PEEL AWAY FACE SHIELD

## (undated) DEVICE — SCREW BNE L40MM DIA4MM STD CANC TI ST LOK FULL THRD BLNT SM
Type: IMPLANTABLE DEVICE | Site: HUMERUS | Status: NON-FUNCTIONAL
Removed: 2021-03-04

## (undated) DEVICE — SOL IRR STRL H2O 500ML STRL --

## (undated) DEVICE — 3M™ STERI-DRAPE™ U-DRAPE 1067 1067 5/BX 4BX/CS/CTN&#X20;: Brand: STERI-DRAPE™

## (undated) DEVICE — BIT DRL DIA3.2MM PROX HUM CALIB DISP

## (undated) DEVICE — LOTION PREP REMV 5OZ IODO CLR TINC OF BENZ DURAPREP

## (undated) DEVICE — SOL IRR SOD CL 0.9% 1000ML BTL --

## (undated) DEVICE — SUT MCRYL + 3-0 27IN PS1 UD --

## (undated) DEVICE — SOL IRR NACL 0.9% 500ML POUR --

## (undated) DEVICE — SCREW BNE L50MM DIA4MM STD CANC TI ST LOK FULL THRD BLNT SM
Type: IMPLANTABLE DEVICE | Site: HUMERUS | Status: NON-FUNCTIONAL
Removed: 2021-03-04

## (undated) DEVICE — TOWEL,OR,DSP,ST,BLUE,STD,6/PK,12PK/CS: Brand: MEDLINE

## (undated) DEVICE — SOL INJ SOD CL 0.9% 1000ML BG --

## (undated) DEVICE — SUTURE VCRL SZ 2-0 L27IN ABSRB VLT L36MM CT-1 1/2 CIR J339H

## (undated) DEVICE — 3M™ COBAN™ NL STERILE NON-LATEX SELF-ADHERENT WRAP, 2084S, 4 IN X 5 YD (10 CM X 4,5 M), 18 ROLLS/CASE: Brand: 3M™ COBAN™

## (undated) DEVICE — BLADE ELECTRODE: Brand: EDGE

## (undated) DEVICE — SUTURE VCRL SZ 0 L36IN ABSRB VLT L36MM CT-1 1/2 CIR J346H

## (undated) DEVICE — HANDPIECE SET WITH HIGH FLOW TIP AND SUCTION TUBE: Brand: INTERPULSE

## (undated) DEVICE — DRSG AQUACEL SURG 3.5 X 10IN -- CONVERT TO ITEM 370183

## (undated) DEVICE — 3M™ STERI-STRIP™ REINFORCED ADHESIVE SKIN CLOSURES, R1547, 1/2 IN X 4 IN (12 MM X 100 MM), 6 STRIPS/ENVELOPE: Brand: 3M™ STERI-STRIP™

## (undated) DEVICE — STAPLER SKIN H3.9MM WIRE DIA0.58MM CRWN 6.9MM 35 STPL ROT

## (undated) DEVICE — INTENDED FOR TISSUE SEPARATION, AND OTHER PROCEDURES THAT REQUIRE A SHARP SURGICAL BLADE TO PUNCTURE OR CUT.: Brand: BARD-PARKER ® CARBON RIB-BACK BLADES

## (undated) DEVICE — SYRINGE IRRIG 60ML SFT PLIABLE BLB EZ TO GRP 1 HND USE W/

## (undated) DEVICE — DECANTER VI C-FLO LF --

## (undated) DEVICE — NEEDLE SPNL 22GA L3.5IN BLK WHTACR PNCL PNT HI FLO DISP

## (undated) DEVICE — SPONGE GZ 4X4 IN 16-PLY DETECTABLE W/ DMT MSTR TAG

## (undated) DEVICE — SYR ASSEMB INFL BLLN 60ML --

## (undated) DEVICE — CHS SHOULDER ARTHROSCOPY: Brand: MEDLINE INDUSTRIES, INC.

## (undated) DEVICE — HEWSON SUTURE RETRIEVER: Brand: HEWSON SUTURE RETRIEVER

## (undated) DEVICE — PAD,ABDOMINAL,8"X7.5",STERILE,LF,1/PK: Brand: MEDLINE

## (undated) DEVICE — DRAPE,ORTHOMAX ,HIP,W/POUCHES: Brand: MEDLINE

## (undated) DEVICE — YANKAUER,BULB TIP,W/O VENT,RIGID,STERILE: Brand: MEDLINE